# Patient Record
Sex: MALE | Race: ASIAN | Employment: FULL TIME | URBAN - METROPOLITAN AREA
[De-identification: names, ages, dates, MRNs, and addresses within clinical notes are randomized per-mention and may not be internally consistent; named-entity substitution may affect disease eponyms.]

---

## 2017-06-21 ENCOUNTER — OFFICE VISIT (OUTPATIENT)
Dept: FAMILY MEDICINE CLINIC | Facility: CLINIC | Age: 52
End: 2017-06-21

## 2017-06-21 VITALS
BODY MASS INDEX: 22.75 KG/M2 | HEART RATE: 64 BPM | WEIGHT: 168 LBS | SYSTOLIC BLOOD PRESSURE: 130 MMHG | DIASTOLIC BLOOD PRESSURE: 70 MMHG | RESPIRATION RATE: 14 BRPM | HEIGHT: 72 IN

## 2017-06-21 DIAGNOSIS — B07.9 VIRAL WARTS, UNSPECIFIED TYPE: Primary | ICD-10-CM

## 2017-06-21 PROCEDURE — 17110 DESTRUCTION B9 LES UP TO 14: CPT | Performed by: FAMILY MEDICINE

## 2017-06-23 NOTE — PROGRESS NOTES
Dorene Vogel is a 50year old male. HPI:   Patient is here this evening with his daughter and wanted to show me something on his hands. He wanted to know if there is something over-the-counter that he can do for it.   Patient states he has had these lesions yellow-tan   granular material.  Representative sections of the right tonsil are   submitted in cassette A.  B- Labeled with the patient's name, medical record number, left tonsil,   received in formalin:  Specimen consists of one pink to yellow-tan   [place]

## 2017-07-14 ENCOUNTER — OFFICE VISIT (OUTPATIENT)
Dept: FAMILY MEDICINE CLINIC | Facility: CLINIC | Age: 52
End: 2017-07-14

## 2017-07-14 ENCOUNTER — LAB ENCOUNTER (OUTPATIENT)
Dept: LAB | Age: 52
End: 2017-07-14
Attending: FAMILY MEDICINE
Payer: COMMERCIAL

## 2017-07-14 VITALS
WEIGHT: 168 LBS | HEIGHT: 70.25 IN | DIASTOLIC BLOOD PRESSURE: 68 MMHG | RESPIRATION RATE: 16 BRPM | BODY MASS INDEX: 24.05 KG/M2 | TEMPERATURE: 98 F | HEART RATE: 77 BPM | SYSTOLIC BLOOD PRESSURE: 102 MMHG | OXYGEN SATURATION: 97 %

## 2017-07-14 DIAGNOSIS — E83.52 HYPERCALCEMIA: ICD-10-CM

## 2017-07-14 DIAGNOSIS — D64.9 ANEMIA, UNSPECIFIED TYPE: ICD-10-CM

## 2017-07-14 DIAGNOSIS — R82.90 ABNORMAL URINE: ICD-10-CM

## 2017-07-14 DIAGNOSIS — R53.83 OTHER FATIGUE: ICD-10-CM

## 2017-07-14 DIAGNOSIS — Z13.89 SCREENING FOR GENITOURINARY CONDITION: ICD-10-CM

## 2017-07-14 DIAGNOSIS — E78.1 HYPERTRIGLYCERIDEMIA: Primary | ICD-10-CM

## 2017-07-14 DIAGNOSIS — L65.9 BALDING: ICD-10-CM

## 2017-07-14 DIAGNOSIS — Z12.11 SCREENING FOR COLON CANCER: ICD-10-CM

## 2017-07-14 LAB
25-HYDROXYVITAMIN D (TOTAL): 14.3 NG/ML (ref 30–100)
ALBUMIN SERPL-MCNC: 4.1 G/DL (ref 3.5–4.8)
ALP LIVER SERPL-CCNC: 50 U/L (ref 45–117)
ALT SERPL-CCNC: 57 U/L (ref 17–63)
APPEARANCE: CLEAR
AST SERPL-CCNC: 28 U/L (ref 15–41)
BASOPHILS # BLD AUTO: 0.06 X10(3) UL (ref 0–0.1)
BASOPHILS NFR BLD AUTO: 0.9 %
BILIRUB SERPL-MCNC: 0.4 MG/DL (ref 0.1–2)
BILIRUB UR QL STRIP.AUTO: NEGATIVE
BUN BLD-MCNC: 12 MG/DL (ref 8–20)
CALCIUM BLD-MCNC: 9.3 MG/DL (ref 8.3–10.3)
CALCIUM BLD-MCNC: 9.3 MG/DL (ref 8.3–10.3)
CHLORIDE: 105 MMOL/L (ref 101–111)
CLARITY UR REFRACT.AUTO: CLEAR
CO2: 28 MMOL/L (ref 22–32)
COLOR UR AUTO: YELLOW
CREAT BLD-MCNC: 1.07 MG/DL (ref 0.7–1.3)
CREAT BLD-MCNC: 1.1 MG/DL (ref 0.7–1.3)
EOSINOPHIL # BLD AUTO: 0.19 X10(3) UL (ref 0–0.3)
EOSINOPHIL NFR BLD AUTO: 2.9 %
ERYTHROCYTE [DISTWIDTH] IN BLOOD BY AUTOMATED COUNT: 13.8 % (ref 11.5–16)
GLUCOSE BLD-MCNC: 88 MG/DL (ref 70–99)
GLUCOSE UR STRIP.AUTO-MCNC: NEGATIVE MG/DL
HAV AB SERPL IA-ACNC: 375 PG/ML (ref 193–986)
HCT VFR BLD AUTO: 45.8 % (ref 37–53)
HGB BLD-MCNC: 15.1 G/DL (ref 13–17)
IMMATURE GRANULOCYTE COUNT: 0.02 X10(3) UL (ref 0–1)
IMMATURE GRANULOCYTE RATIO %: 0.3 %
KETONES UR STRIP.AUTO-MCNC: NEGATIVE MG/DL
LEUKOCYTE ESTERASE UR QL STRIP.AUTO: NEGATIVE
LYMPHOCYTES # BLD AUTO: 3.18 X10(3) UL (ref 0.9–4)
LYMPHOCYTES NFR BLD AUTO: 48.1 %
M PROTEIN MFR SERPL ELPH: 7.8 G/DL (ref 6.1–8.3)
MCH RBC QN AUTO: 26.3 PG (ref 27–33.2)
MCHC RBC AUTO-ENTMCNC: 33 G/DL (ref 31–37)
MCV RBC AUTO: 79.7 FL (ref 80–99)
MONOCYTES # BLD AUTO: 0.6 X10(3) UL (ref 0.1–0.6)
MONOCYTES NFR BLD AUTO: 9.1 %
MULTISTIX LOT#: NORMAL NUMERIC
NEUTROPHIL ABS PRELIM: 2.56 X10 (3) UL (ref 1.3–6.7)
NEUTROPHILS # BLD AUTO: 2.56 X10(3) UL (ref 1.3–6.7)
NEUTROPHILS NFR BLD AUTO: 38.7 %
NITRITE UR QL STRIP.AUTO: NEGATIVE
PH UR STRIP.AUTO: 7 [PH] (ref 4.5–8)
PH, URINE: 7.5 (ref 4.5–8)
PHOSPHATE SERPL-MCNC: 3.4 MG/DL (ref 2.5–4.9)
PLATELET # BLD AUTO: 350 10(3)UL (ref 150–450)
POTASSIUM SERPL-SCNC: 4 MMOL/L (ref 3.6–5.1)
PROT UR STRIP.AUTO-MCNC: NEGATIVE MG/DL
PTH-INTACT SERPL-MCNC: 32.4 PG/ML (ref 11.1–79.5)
RBC # BLD AUTO: 5.75 X10(6)UL (ref 4.3–5.7)
RBC UR QL AUTO: NEGATIVE
RED CELL DISTRIBUTION WIDTH-SD: 39.5 FL (ref 35.1–46.3)
SODIUM SERPL-SCNC: 140 MMOL/L (ref 136–144)
SP GR UR STRIP.AUTO: 1.02 (ref 1–1.03)
SPECIFIC GRAVITY: 1.01 (ref 1–1.03)
TSI SER-ACNC: 1.53 MIU/ML (ref 0.35–5.5)
URINE-COLOR: YELLOW
UROBILINOGEN UR STRIP.AUTO-MCNC: <2 MG/DL
UROBILINOGEN,SEMI-QN: 0.2 MG/DL (ref 0–1.9)
WBC # BLD AUTO: 6.6 X10(3) UL (ref 4–13)

## 2017-07-14 PROCEDURE — 80050 GENERAL HEALTH PANEL: CPT | Performed by: FAMILY MEDICINE

## 2017-07-14 PROCEDURE — 84402 ASSAY OF FREE TESTOSTERONE: CPT | Performed by: FAMILY MEDICINE

## 2017-07-14 PROCEDURE — 84403 ASSAY OF TOTAL TESTOSTERONE: CPT | Performed by: FAMILY MEDICINE

## 2017-07-14 PROCEDURE — 83970 ASSAY OF PARATHORMONE: CPT | Performed by: FAMILY MEDICINE

## 2017-07-14 PROCEDURE — 83550 IRON BINDING TEST: CPT | Performed by: FAMILY MEDICINE

## 2017-07-14 PROCEDURE — 83540 ASSAY OF IRON: CPT | Performed by: FAMILY MEDICINE

## 2017-07-14 PROCEDURE — 82306 VITAMIN D 25 HYDROXY: CPT | Performed by: FAMILY MEDICINE

## 2017-07-14 PROCEDURE — 99396 PREV VISIT EST AGE 40-64: CPT | Performed by: FAMILY MEDICINE

## 2017-07-14 PROCEDURE — 36415 COLL VENOUS BLD VENIPUNCTURE: CPT | Performed by: FAMILY MEDICINE

## 2017-07-14 PROCEDURE — 84100 ASSAY OF PHOSPHORUS: CPT | Performed by: FAMILY MEDICINE

## 2017-07-14 PROCEDURE — 82607 VITAMIN B-12: CPT | Performed by: FAMILY MEDICINE

## 2017-07-14 PROCEDURE — 81003 URINALYSIS AUTO W/O SCOPE: CPT | Performed by: FAMILY MEDICINE

## 2017-07-14 RX ORDER — DOXEPIN HYDROCHLORIDE 50 MG/1
1 CAPSULE ORAL DAILY
COMMUNITY
End: 2020-04-21 | Stop reason: ALTCHOICE

## 2017-07-14 NOTE — H&P
Eva Sosa is a 50year old male who presents for a complete physical exam.   HPI:   Pt complains of being more tired and a lesion on his heel.     Wt Readings from Last 6 Encounters:  07/14/17 : 168 lb  06/21/17 : 168 lb  06/29/16 : 163 lb  05/27/16 : 160 l yellow-tan   palatine tonsil measuring 3.5 x 3.0 x 1.5 cm. It is serially sectioned   and shows its normal pink to yellow-tan corrugated architecture. The   tonsil weighs 7.3 grams.   The tonsillar crypts show firm yellow-tan   granular material.  Represe anxiety  HEMATOLOGIC: denies hx of anemia  ENDOCRINE: denies thyroid history  ALL/ASTHMA: denies hx of allergy or asthma    EXAM:   /68 (BP Location: Left arm, Patient Position: Sitting, Cuff Size: adult)   Pulse 77   Temp 98.2 °F (36.8 °C) (Oral) these issues and agrees to the plan. The patient is asked to return in 6 months.

## 2017-07-17 DIAGNOSIS — D64.9 ANEMIA, UNSPECIFIED TYPE: Primary | ICD-10-CM

## 2017-07-17 LAB
IRON SATURATION: 29 % (ref 13–45)
IRON: 127 UG/DL (ref 45–182)
TESTOSTERONE TOTAL: 396 NG/DL
TESTOSTERONE, FREE -MS/MS: 83.6 PG/ML
TOTAL IRON BINDING CAPACITY: 441 UG/DL (ref 298–536)
TRANSFERRIN: 296 MG/DL (ref 200–360)

## 2017-07-18 DIAGNOSIS — R79.89 LOW VITAMIN D LEVEL: Primary | ICD-10-CM

## 2017-07-18 RX ORDER — ERGOCALCIFEROL 1.25 MG/1
50000 CAPSULE ORAL WEEKLY
Qty: 12 CAPSULE | Refills: 0 | Status: SHIPPED | OUTPATIENT
Start: 2017-07-18 | End: 2020-04-21 | Stop reason: ALTCHOICE

## 2017-07-19 DIAGNOSIS — R71.8 MICROCYTOSIS: Primary | ICD-10-CM

## 2017-08-23 ENCOUNTER — OFFICE VISIT (OUTPATIENT)
Dept: FAMILY MEDICINE CLINIC | Facility: CLINIC | Age: 52
End: 2017-08-23

## 2017-08-23 VITALS
OXYGEN SATURATION: 100 % | DIASTOLIC BLOOD PRESSURE: 80 MMHG | RESPIRATION RATE: 16 BRPM | TEMPERATURE: 98 F | SYSTOLIC BLOOD PRESSURE: 130 MMHG | WEIGHT: 166 LBS | HEIGHT: 70.25 IN | HEART RATE: 76 BPM | BODY MASS INDEX: 23.77 KG/M2

## 2017-08-23 DIAGNOSIS — J01.00 ACUTE NON-RECURRENT MAXILLARY SINUSITIS: Primary | ICD-10-CM

## 2017-08-23 DIAGNOSIS — B07.9 VIRAL WARTS, UNSPECIFIED TYPE: ICD-10-CM

## 2017-08-23 DIAGNOSIS — Z86.39 HISTORY OF IRON DEFICIENCY: ICD-10-CM

## 2017-08-23 DIAGNOSIS — R79.89 ABNORMAL CBC: ICD-10-CM

## 2017-08-23 PROCEDURE — 99213 OFFICE O/P EST LOW 20 MIN: CPT | Performed by: FAMILY MEDICINE

## 2017-08-23 PROCEDURE — 17110 DESTRUCTION B9 LES UP TO 14: CPT | Performed by: FAMILY MEDICINE

## 2017-08-23 RX ORDER — AMOXICILLIN AND CLAVULANATE POTASSIUM 875; 125 MG/1; MG/1
1 TABLET, FILM COATED ORAL 2 TIMES DAILY
Qty: 20 TABLET | Refills: 0 | Status: SHIPPED | OUTPATIENT
Start: 2017-08-23 | End: 2017-09-02

## 2017-08-23 NOTE — PROGRESS NOTES
HPI:   Long De Oliveira is a 50year old male who presents for upper respiratory symptoms for  6  days. Patient reports sore throat, congestion, sinus pain.       Current Outpatient Prescriptions:  Amoxicillin-Pot Clavulanate 875-125 MG Oral Tab Take 1 tablet by m lesions; wart frozen on left lateral heel and tip of second digit on left hand  EYES:PERRL, EOMI,conjunctiva are clear; left conjunctiva is red  HEENT: atraumatic, normocephalic,ears and throat are clear; maxillary and frontal sinuses transilluminated well

## 2017-10-09 RX ORDER — ERGOCALCIFEROL 1.25 MG/1
CAPSULE ORAL
Qty: 4 CAPSULE | Refills: 0 | OUTPATIENT
Start: 2017-10-09

## 2017-10-09 NOTE — TELEPHONE ENCOUNTER
Notes Recorded by Demetri Parsons DO on 7/17/2017 at 5:53 PM CDT  Testosterone, cmp, tsh, pth wnl  Vitamin d is low -- advise 87278go/wk for 12 weeks then 2000iu/d and recheck vitamin d in 4 months  Anemia  Will check for iron def.    Vitamin b12 on low end

## 2017-11-22 ENCOUNTER — LAB ENCOUNTER (OUTPATIENT)
Dept: LAB | Age: 52
End: 2017-11-22
Attending: FAMILY MEDICINE
Payer: COMMERCIAL

## 2017-11-22 DIAGNOSIS — R79.89 ABNORMAL CBC: ICD-10-CM

## 2017-11-22 DIAGNOSIS — R79.89 LOW VITAMIN D LEVEL: ICD-10-CM

## 2017-11-22 DIAGNOSIS — R71.8 MICROCYTOSIS: ICD-10-CM

## 2017-11-22 DIAGNOSIS — E83.52 HYPERCALCEMIA: ICD-10-CM

## 2017-11-22 DIAGNOSIS — E78.1 HYPERTRIGLYCERIDEMIA: ICD-10-CM

## 2017-11-22 DIAGNOSIS — R73.9 HYPERGLYCEMIA: ICD-10-CM

## 2017-11-22 DIAGNOSIS — D64.9 ANEMIA, UNSPECIFIED TYPE: ICD-10-CM

## 2017-11-22 DIAGNOSIS — Z86.39 HISTORY OF IRON DEFICIENCY: ICD-10-CM

## 2017-11-22 PROCEDURE — 83036 HEMOGLOBIN GLYCOSYLATED A1C: CPT | Performed by: FAMILY MEDICINE

## 2017-11-22 PROCEDURE — 36415 COLL VENOUS BLD VENIPUNCTURE: CPT | Performed by: FAMILY MEDICINE

## 2017-11-22 PROCEDURE — 82306 VITAMIN D 25 HYDROXY: CPT | Performed by: FAMILY MEDICINE

## 2017-11-22 PROCEDURE — 82728 ASSAY OF FERRITIN: CPT | Performed by: FAMILY MEDICINE

## 2017-11-22 PROCEDURE — 85025 COMPLETE CBC W/AUTO DIFF WBC: CPT | Performed by: FAMILY MEDICINE

## 2017-11-22 PROCEDURE — 80061 LIPID PANEL: CPT | Performed by: FAMILY MEDICINE

## 2017-11-22 PROCEDURE — 81405 MOPATH PROCEDURE LEVEL 6: CPT | Performed by: FAMILY MEDICINE

## 2017-11-22 PROCEDURE — 80053 COMPREHEN METABOLIC PANEL: CPT | Performed by: FAMILY MEDICINE

## 2017-11-25 DIAGNOSIS — R73.9 HYPERGLYCEMIA: Primary | ICD-10-CM

## 2017-12-01 PROBLEM — D56.3 ALPHA THALASSEMIA SILENT CARRIER: Status: ACTIVE | Noted: 2017-12-01

## 2017-12-07 DIAGNOSIS — R73.03 PREDIABETES: Primary | ICD-10-CM

## 2017-12-07 DIAGNOSIS — E55.9 VITAMIN D DEFICIENCY: ICD-10-CM

## 2017-12-07 DIAGNOSIS — E78.1 HIGH BLOOD TRIGLYCERIDES: ICD-10-CM

## 2018-02-28 ENCOUNTER — OFFICE VISIT (OUTPATIENT)
Dept: FAMILY MEDICINE CLINIC | Facility: CLINIC | Age: 53
End: 2018-02-28

## 2018-02-28 VITALS
WEIGHT: 170 LBS | RESPIRATION RATE: 12 BRPM | HEART RATE: 68 BPM | SYSTOLIC BLOOD PRESSURE: 120 MMHG | DIASTOLIC BLOOD PRESSURE: 70 MMHG | TEMPERATURE: 98 F | BODY MASS INDEX: 24.34 KG/M2 | HEIGHT: 70.25 IN

## 2018-02-28 DIAGNOSIS — E78.1 HYPERTRIGLYCERIDEMIA: ICD-10-CM

## 2018-02-28 DIAGNOSIS — E55.9 VITAMIN D DEFICIENCY: ICD-10-CM

## 2018-02-28 DIAGNOSIS — D23.9 FIBROMA OF SKIN: ICD-10-CM

## 2018-02-28 DIAGNOSIS — Z86.39 HISTORY OF IRON DEFICIENCY: ICD-10-CM

## 2018-02-28 DIAGNOSIS — D56.3 ALPHA THALASSEMIA SILENT CARRIER: ICD-10-CM

## 2018-02-28 DIAGNOSIS — R73.03 PREDIABETES: Primary | ICD-10-CM

## 2018-02-28 DIAGNOSIS — E78.6 LOW HDL (UNDER 40): ICD-10-CM

## 2018-02-28 PROCEDURE — 99214 OFFICE O/P EST MOD 30 MIN: CPT | Performed by: FAMILY MEDICINE

## 2018-02-28 NOTE — PROGRESS NOTES
Robert Giraldo is a 50year old male. HPI:   Pt. States he is here to discuss his blood work from 11/17. He is not exercising. He is taking 5000iu/d for the past year. He states that his TG are always elevated.   He stopped niacin in 7/17 and his TG increase 85.3 22.0 - 322.0 ng/mL   -VITAMIN D, 25-HYDROXY   Result Value Ref Range   25-Hydroxyvitamin D (Total) 29.4 (L) 30.0 - 100.0 ng/mL   -THALASSEMIA ALPHA GLOBIN HBA1/2 SEQUENCING   Result Value Ref Range   Alpha Globin (HBA1/2) [place] Specimen Whole Blood Ht 70.25\"   Wt 170 lb   BMI 24.22 kg/m²   GENERAL: well developed, well nourished,in no apparent distress  PSYCHE: normal mood and affect  SKIN: fibroma on left lateral foot -- 5 mm in diameter; he feels it is grown and not a wart  NECK: supple,no adenopa

## 2018-06-13 ENCOUNTER — APPOINTMENT (OUTPATIENT)
Dept: LAB | Age: 53
End: 2018-06-13
Attending: FAMILY MEDICINE
Payer: COMMERCIAL

## 2018-06-13 DIAGNOSIS — E78.1 HYPERTRIGLYCERIDEMIA: ICD-10-CM

## 2018-06-13 DIAGNOSIS — E78.6 LOW HDL (UNDER 40): ICD-10-CM

## 2018-06-13 DIAGNOSIS — E55.9 VITAMIN D DEFICIENCY: ICD-10-CM

## 2018-06-13 DIAGNOSIS — R73.03 PREDIABETES: ICD-10-CM

## 2018-06-13 PROCEDURE — 80061 LIPID PANEL: CPT | Performed by: FAMILY MEDICINE

## 2018-06-13 PROCEDURE — 36415 COLL VENOUS BLD VENIPUNCTURE: CPT | Performed by: FAMILY MEDICINE

## 2018-06-13 PROCEDURE — 80053 COMPREHEN METABOLIC PANEL: CPT | Performed by: FAMILY MEDICINE

## 2018-06-13 PROCEDURE — 83036 HEMOGLOBIN GLYCOSYLATED A1C: CPT | Performed by: FAMILY MEDICINE

## 2018-06-13 PROCEDURE — 82306 VITAMIN D 25 HYDROXY: CPT | Performed by: FAMILY MEDICINE

## 2018-06-18 ENCOUNTER — OFFICE VISIT (OUTPATIENT)
Dept: FAMILY MEDICINE CLINIC | Facility: CLINIC | Age: 53
End: 2018-06-18

## 2018-06-18 VITALS
WEIGHT: 168 LBS | RESPIRATION RATE: 12 BRPM | HEIGHT: 70.5 IN | DIASTOLIC BLOOD PRESSURE: 70 MMHG | BODY MASS INDEX: 23.78 KG/M2 | HEART RATE: 78 BPM | SYSTOLIC BLOOD PRESSURE: 110 MMHG

## 2018-06-18 DIAGNOSIS — E78.6 LOW HDL (UNDER 40): ICD-10-CM

## 2018-06-18 DIAGNOSIS — Z86.39 HISTORY OF IRON DEFICIENCY: ICD-10-CM

## 2018-06-18 DIAGNOSIS — R73.03 PREDIABETES: Primary | ICD-10-CM

## 2018-06-18 DIAGNOSIS — Z12.11 SCREENING FOR MALIGNANT NEOPLASM OF COLON: ICD-10-CM

## 2018-06-18 DIAGNOSIS — E55.9 VITAMIN D DEFICIENCY: ICD-10-CM

## 2018-06-18 DIAGNOSIS — E78.1 HYPERTRIGLYCERIDEMIA: ICD-10-CM

## 2018-06-18 DIAGNOSIS — D56.3 ALPHA THALASSEMIA SILENT CARRIER: ICD-10-CM

## 2018-06-18 PROCEDURE — 99214 OFFICE O/P EST MOD 30 MIN: CPT | Performed by: FAMILY MEDICINE

## 2018-06-18 NOTE — PROGRESS NOTES
Treasure Goltz is a 48year old male. HPI:   Pt. States he is here to discuss his blood work from 6/18. He is not exercising. He is aware he has to focus on diet and exercise. He is taking 5000iu/d for the past year.   Hypertriglyceridemia -- focusing on die 117 68 - 126 mg/dL   -VITAMIN D, 25-HYDROXY   Result Value Ref Range   25-Hydroxyvitamin D (Total) 37.8 30.0 - 100.0 ng/mL       REVIEW OF SYSTEMS:   GENERAL: feels well otherwise  SKIN: skin lesions  EYES:denies blurred vision or double vision  HEENT: den exercise  Dyslipidemia/hypertriglyceridemia–patient will continue to focus on diet exercise and cut down on the starches in his diet  Vitamin D deficiency–advised to cont.  5000iu/d  Thalassemia–currently stable  The patient indicates understanding of these

## 2018-12-14 ENCOUNTER — TELEPHONE (OUTPATIENT)
Dept: FAMILY MEDICINE CLINIC | Facility: CLINIC | Age: 53
End: 2018-12-14

## 2018-12-14 NOTE — TELEPHONE ENCOUNTER
Dr. Dominguez Mail,    A claim was denied by [facility] for this patient for a Lab for Alpha Thalassemia done on 11/22/17. [employer] would like to set up a Peer to Peer. Please call the number below to set that up.     [employer]  [phone]

## 2018-12-17 NOTE — TELEPHONE ENCOUNTER
Spoke with Raji from [facility]. She stated for this case a peer to peer is not an option.  can only send in a letter of appeal with addition information.

## 2019-02-04 ENCOUNTER — PATIENT OUTREACH (OUTPATIENT)
Dept: FAMILY MEDICINE CLINIC | Facility: CLINIC | Age: 54
End: 2019-02-04

## 2019-02-04 NOTE — PROGRESS NOTES
Per notes patient is due for colonoscopy. Please remind patient to follow up with Dr. Vickie Jacobson re: colonoscopy/screening colon cancer.  Thank you

## 2020-04-20 ENCOUNTER — TELEPHONE (OUTPATIENT)
Dept: FAMILY MEDICINE CLINIC | Facility: CLINIC | Age: 55
End: 2020-04-20

## 2020-04-20 NOTE — TELEPHONE ENCOUNTER
My chart msg received. Pt called this morning also. Bring in for office visit?     Appointment For: Olga Carlos ([id number])   Visit Type: MYCHART EXAM (2964)      5/2/2020    11:45 AM  15 mins.  Haylee Núñez DO         EM[facility] [address]      Patient Comments

## 2020-04-21 ENCOUNTER — OFFICE VISIT (OUTPATIENT)
Dept: FAMILY MEDICINE CLINIC | Facility: CLINIC | Age: 55
End: 2020-04-21
Payer: COMMERCIAL

## 2020-04-21 ENCOUNTER — LAB ENCOUNTER (OUTPATIENT)
Dept: LAB | Age: 55
End: 2020-04-21
Attending: FAMILY MEDICINE
Payer: COMMERCIAL

## 2020-04-21 ENCOUNTER — HOSPITAL ENCOUNTER (OUTPATIENT)
Dept: ULTRASOUND IMAGING | Age: 55
Discharge: HOME | End: 2020-04-21
Attending: FAMILY MEDICINE
Payer: COMMERCIAL

## 2020-04-21 VITALS
SYSTOLIC BLOOD PRESSURE: 130 MMHG | DIASTOLIC BLOOD PRESSURE: 80 MMHG | TEMPERATURE: 98 F | BODY MASS INDEX: 23.78 KG/M2 | RESPIRATION RATE: 18 BRPM | WEIGHT: 168 LBS | HEART RATE: 76 BPM | HEIGHT: 70.5 IN

## 2020-04-21 DIAGNOSIS — E78.5 DYSLIPIDEMIA: ICD-10-CM

## 2020-04-21 DIAGNOSIS — R59.1 LAD (LYMPHADENOPATHY): Primary | ICD-10-CM

## 2020-04-21 DIAGNOSIS — Z00.00 LABORATORY EXAMINATION ORDERED AS PART OF A ROUTINE GENERAL MEDICAL EXAMINATION: ICD-10-CM

## 2020-04-21 DIAGNOSIS — E55.9 VITAMIN D DEFICIENCY: ICD-10-CM

## 2020-04-21 DIAGNOSIS — R59.1 LAD (LYMPHADENOPATHY): ICD-10-CM

## 2020-04-21 DIAGNOSIS — Z13.89 SCREENING FOR GENITOURINARY CONDITION: ICD-10-CM

## 2020-04-21 DIAGNOSIS — R73.03 PREDIABETES: ICD-10-CM

## 2020-04-21 DIAGNOSIS — Z80.7: ICD-10-CM

## 2020-04-21 PROCEDURE — 80061 LIPID PANEL: CPT | Performed by: FAMILY MEDICINE

## 2020-04-21 PROCEDURE — 81001 URINALYSIS AUTO W/SCOPE: CPT | Performed by: FAMILY MEDICINE

## 2020-04-21 PROCEDURE — 82306 VITAMIN D 25 HYDROXY: CPT | Performed by: FAMILY MEDICINE

## 2020-04-21 PROCEDURE — 36415 COLL VENOUS BLD VENIPUNCTURE: CPT | Performed by: FAMILY MEDICINE

## 2020-04-21 PROCEDURE — 83036 HEMOGLOBIN GLYCOSYLATED A1C: CPT | Performed by: FAMILY MEDICINE

## 2020-04-21 PROCEDURE — 80050 GENERAL HEALTH PANEL: CPT | Performed by: FAMILY MEDICINE

## 2020-04-21 PROCEDURE — 99214 OFFICE O/P EST MOD 30 MIN: CPT | Performed by: FAMILY MEDICINE

## 2020-04-21 PROCEDURE — 76536 US EXAM OF HEAD AND NECK: CPT | Performed by: FAMILY MEDICINE

## 2020-04-21 RX ORDER — ERGOCALCIFEROL 1.25 MG/1
50000 CAPSULE ORAL WEEKLY
Qty: 12 CAPSULE | Refills: 0 | Status: SHIPPED | OUTPATIENT
Start: 2020-04-21 | End: 2020-05-21

## 2020-04-21 NOTE — PROGRESS NOTES
Marilu Liz is a 48year old male. HPI:   Patient complains of a large lump in the base of the left neck for the past 2 weeks. He states he was feeling around on his neck and noticed the lump.   Patient denies any fevers, chills, weight loss, aches or pains Vitamin D, 25OH, Total 37.8 30.0 - 100.0 ng/mL       REVIEW OF SYSTEMS:   GENERAL: feels well otherwise  SKIN: denies any unusual skin lesions  LUNGS: denies shortness of breath with exertion  CARDIOVASCULAR: denies chest pain on exertion  GI: denies abdom indicates understanding of these issues and agrees to the plan.   Return in about 3 months (around 7/21/2020) for physical.

## 2020-04-22 ENCOUNTER — TELEPHONE (OUTPATIENT)
Dept: FAMILY MEDICINE CLINIC | Facility: CLINIC | Age: 55
End: 2020-04-22

## 2020-04-22 DIAGNOSIS — R31.29 MICROSCOPIC HEMATURIA: Primary | ICD-10-CM

## 2020-04-22 DIAGNOSIS — E55.9 VITAMIN D DEFICIENCY: Primary | ICD-10-CM

## 2020-05-28 ENCOUNTER — HOSPITAL ENCOUNTER (OUTPATIENT)
Dept: ULTRASOUND IMAGING | Age: 55
Discharge: HOME | End: 2020-05-28
Attending: FAMILY MEDICINE
Payer: COMMERCIAL

## 2020-05-28 DIAGNOSIS — R59.1 LAD (LYMPHADENOPATHY): ICD-10-CM

## 2020-05-28 PROCEDURE — 76536 US EXAM OF HEAD AND NECK: CPT | Performed by: FAMILY MEDICINE

## 2020-06-23 ENCOUNTER — OFFICE VISIT (OUTPATIENT)
Dept: FAMILY MEDICINE CLINIC | Facility: CLINIC | Age: 55
End: 2020-06-23
Payer: COMMERCIAL

## 2020-06-23 VITALS
DIASTOLIC BLOOD PRESSURE: 70 MMHG | HEIGHT: 71 IN | WEIGHT: 171 LBS | HEART RATE: 80 BPM | SYSTOLIC BLOOD PRESSURE: 112 MMHG | BODY MASS INDEX: 23.94 KG/M2 | TEMPERATURE: 99 F | RESPIRATION RATE: 16 BRPM

## 2020-06-23 DIAGNOSIS — R31.29 MICROSCOPIC HEMATURIA: ICD-10-CM

## 2020-06-23 DIAGNOSIS — E55.9 VITAMIN D DEFICIENCY: ICD-10-CM

## 2020-06-23 DIAGNOSIS — R73.03 PREDIABETES: ICD-10-CM

## 2020-06-23 DIAGNOSIS — R59.1 LAD (LYMPHADENOPATHY): ICD-10-CM

## 2020-06-23 DIAGNOSIS — E78.1 HYPERTRIGLYCERIDEMIA: ICD-10-CM

## 2020-06-23 DIAGNOSIS — Z86.39 HISTORY OF IRON DEFICIENCY: ICD-10-CM

## 2020-06-23 DIAGNOSIS — Z80.7: ICD-10-CM

## 2020-06-23 DIAGNOSIS — D56.3 ALPHA THALASSEMIA SILENT CARRIER: ICD-10-CM

## 2020-06-23 DIAGNOSIS — E78.6 LOW HDL (UNDER 40): ICD-10-CM

## 2020-06-23 DIAGNOSIS — Z01.818 PREOP EXAMINATION: Primary | ICD-10-CM

## 2020-06-23 PROCEDURE — 93000 ELECTROCARDIOGRAM COMPLETE: CPT | Performed by: FAMILY MEDICINE

## 2020-06-23 PROCEDURE — 99244 OFF/OP CNSLTJ NEW/EST MOD 40: CPT | Performed by: FAMILY MEDICINE

## 2020-06-23 RX ORDER — CHOLECALCIFEROL (VITAMIN D3) 1250 MCG
CAPSULE ORAL WEEKLY
COMMUNITY
End: 2021-04-01 | Stop reason: ALTCHOICE

## 2020-06-23 NOTE — H&P
Sharonda Aldana is a 50year old male who presents for a pre-operative physical exam. Patient is to have left deep neck mass excision with assistant, to be done by Dr. Mehdi Goodwin at [address] on 6/25/2020. HPI:   Pt complains of LAD.     Current Outpatient [facility] itching,denies nocturia or changes in stream  MUSCULOSKELETAL: denies back pain  NEURO: denies headaches  PSYCHE: denies depression or anxiety  HEMATOLOGIC: denies hx of anemia  ENDOCRINE: denies thyroid history  ALL/ASTHMA: denies hx of allergy or asthma or pulmonary conditions. Pt is a good surgical candidate. This consult was sent back the referring physician, Dr. Mehdi Goodwin.

## 2020-06-24 ENCOUNTER — LAB ENCOUNTER (OUTPATIENT)
Dept: LAB | Facility: HOSPITAL | Age: 55
End: 2020-06-24
Attending: OTOLARYNGOLOGY
Payer: COMMERCIAL

## 2020-06-24 DIAGNOSIS — I88.9 CERVICAL LYMPHADENITIS: ICD-10-CM

## 2020-06-25 ENCOUNTER — ANESTHESIA EVENT (OUTPATIENT)
Dept: SURGERY | Facility: HOSPITAL | Age: 55
End: 2020-06-25
Payer: COMMERCIAL

## 2020-06-25 ENCOUNTER — HOSPITAL ENCOUNTER (OUTPATIENT)
Facility: HOSPITAL | Age: 55
Setting detail: HOSPITAL OUTPATIENT SURGERY
Discharge: HOME | End: 2020-06-25
Attending: OTOLARYNGOLOGY | Admitting: OTOLARYNGOLOGY
Payer: COMMERCIAL

## 2020-06-25 ENCOUNTER — ANESTHESIA (OUTPATIENT)
Dept: SURGERY | Facility: HOSPITAL | Age: 55
End: 2020-06-25
Payer: COMMERCIAL

## 2020-06-25 VITALS
HEIGHT: 71 IN | TEMPERATURE: 97 F | RESPIRATION RATE: 12 BRPM | OXYGEN SATURATION: 100 % | DIASTOLIC BLOOD PRESSURE: 77 MMHG | BODY MASS INDEX: 23.15 KG/M2 | WEIGHT: 165.38 LBS | SYSTOLIC BLOOD PRESSURE: 135 MMHG | HEART RATE: 55 BPM

## 2020-06-25 DIAGNOSIS — I88.9 CERVICAL LYMPHADENITIS: Primary | ICD-10-CM

## 2020-06-25 DIAGNOSIS — J34.2 DEVIATED NASAL SEPTUM: ICD-10-CM

## 2020-06-25 PROCEDURE — 88184 FLOWCYTOMETRY/ TC 1 MARKER: CPT | Performed by: OTOLARYNGOLOGY

## 2020-06-25 PROCEDURE — 88341 IMHCHEM/IMCYTCHM EA ADD ANTB: CPT | Performed by: OTOLARYNGOLOGY

## 2020-06-25 PROCEDURE — 88185 FLOWCYTOMETRY/TC ADD-ON: CPT | Performed by: OTOLARYNGOLOGY

## 2020-06-25 PROCEDURE — 87206 SMEAR FLUORESCENT/ACID STAI: CPT | Performed by: ANESTHESIOLOGY

## 2020-06-25 PROCEDURE — 88342 IMHCHEM/IMCYTCHM 1ST ANTB: CPT | Performed by: OTOLARYNGOLOGY

## 2020-06-25 PROCEDURE — 88307 TISSUE EXAM BY PATHOLOGIST: CPT | Performed by: OTOLARYNGOLOGY

## 2020-06-25 PROCEDURE — 87116 MYCOBACTERIA CULTURE: CPT | Performed by: ANESTHESIOLOGY

## 2020-06-25 PROCEDURE — 87102 FUNGUS ISOLATION CULTURE: CPT | Performed by: ANESTHESIOLOGY

## 2020-06-25 PROCEDURE — 88321 CONSLTJ&REPRT SLD PREP ELSWR: CPT | Performed by: OTOLARYNGOLOGY

## 2020-06-25 PROCEDURE — 07B20ZX EXCISION OF LEFT NECK LYMPHATIC, OPEN APPROACH, DIAGNOSTIC: ICD-10-PCS | Performed by: OTOLARYNGOLOGY

## 2020-06-25 RX ORDER — SODIUM CHLORIDE, SODIUM LACTATE, POTASSIUM CHLORIDE, CALCIUM CHLORIDE 600; 310; 30; 20 MG/100ML; MG/100ML; MG/100ML; MG/100ML
INJECTION, SOLUTION INTRAVENOUS CONTINUOUS
Status: DISCONTINUED | OUTPATIENT
Start: 2020-06-25 | End: 2020-06-25

## 2020-06-25 RX ORDER — NALOXONE HYDROCHLORIDE 0.4 MG/ML
80 INJECTION, SOLUTION INTRAMUSCULAR; INTRAVENOUS; SUBCUTANEOUS AS NEEDED
Status: DISCONTINUED | OUTPATIENT
Start: 2020-06-25 | End: 2020-06-25

## 2020-06-25 RX ORDER — DEXAMETHASONE SODIUM PHOSPHATE 4 MG/ML
4 VIAL (ML) INJECTION AS NEEDED
Status: DISCONTINUED | OUTPATIENT
Start: 2020-06-25 | End: 2020-06-25

## 2020-06-25 RX ORDER — ROCURONIUM BROMIDE 10 MG/ML
INJECTION, SOLUTION INTRAVENOUS AS NEEDED
Status: DISCONTINUED | OUTPATIENT
Start: 2020-06-25 | End: 2020-06-25 | Stop reason: SURG

## 2020-06-25 RX ORDER — NEOSTIGMINE METHYLSULFATE 1 MG/ML
INJECTION INTRAVENOUS AS NEEDED
Status: DISCONTINUED | OUTPATIENT
Start: 2020-06-25 | End: 2020-06-25 | Stop reason: SURG

## 2020-06-25 RX ORDER — HYDROCODONE BITARTRATE AND ACETAMINOPHEN 5; 325 MG/1; MG/1
1 TABLET ORAL AS NEEDED
Status: COMPLETED | OUTPATIENT
Start: 2020-06-25 | End: 2020-06-25

## 2020-06-25 RX ORDER — HYDROCODONE BITARTRATE AND ACETAMINOPHEN 5; 325 MG/1; MG/1
2 TABLET ORAL AS NEEDED
Status: COMPLETED | OUTPATIENT
Start: 2020-06-25 | End: 2020-06-25

## 2020-06-25 RX ORDER — CEFAZOLIN SODIUM/WATER 2 G/20 ML
2 SYRINGE (ML) INTRAVENOUS ONCE
Status: DISCONTINUED | OUTPATIENT
Start: 2020-06-25 | End: 2020-06-25 | Stop reason: HOSPADM

## 2020-06-25 RX ORDER — ONDANSETRON 2 MG/ML
4 INJECTION INTRAMUSCULAR; INTRAVENOUS AS NEEDED
Status: DISCONTINUED | OUTPATIENT
Start: 2020-06-25 | End: 2020-06-25

## 2020-06-25 RX ORDER — ONDANSETRON 2 MG/ML
INJECTION INTRAMUSCULAR; INTRAVENOUS AS NEEDED
Status: DISCONTINUED | OUTPATIENT
Start: 2020-06-25 | End: 2020-06-25 | Stop reason: SURG

## 2020-06-25 RX ORDER — HYDROMORPHONE HYDROCHLORIDE 1 MG/ML
INJECTION, SOLUTION INTRAMUSCULAR; INTRAVENOUS; SUBCUTANEOUS
Status: COMPLETED
Start: 2020-06-25 | End: 2020-06-25

## 2020-06-25 RX ORDER — CEFAZOLIN SODIUM/WATER 2 G/20 ML
SYRINGE (ML) INTRAVENOUS
Status: DISCONTINUED
Start: 2020-06-25 | End: 2020-06-25

## 2020-06-25 RX ORDER — MIDAZOLAM HYDROCHLORIDE 1 MG/ML
1 INJECTION INTRAMUSCULAR; INTRAVENOUS EVERY 5 MIN PRN
Status: DISCONTINUED | OUTPATIENT
Start: 2020-06-25 | End: 2020-06-25

## 2020-06-25 RX ORDER — ACETAMINOPHEN 500 MG
1000 TABLET ORAL ONCE
Status: DISCONTINUED | OUTPATIENT
Start: 2020-06-25 | End: 2020-06-25 | Stop reason: HOSPADM

## 2020-06-25 RX ORDER — MEPERIDINE HYDROCHLORIDE 25 MG/ML
12.5 INJECTION INTRAMUSCULAR; INTRAVENOUS; SUBCUTANEOUS AS NEEDED
Status: DISCONTINUED | OUTPATIENT
Start: 2020-06-25 | End: 2020-06-25

## 2020-06-25 RX ORDER — ACETAMINOPHEN 500 MG
1000 TABLET ORAL EVERY 6 HOURS PRN
COMMUNITY
End: 2021-06-29 | Stop reason: ALTCHOICE

## 2020-06-25 RX ORDER — METOCLOPRAMIDE HYDROCHLORIDE 5 MG/ML
10 INJECTION INTRAMUSCULAR; INTRAVENOUS AS NEEDED
Status: DISCONTINUED | OUTPATIENT
Start: 2020-06-25 | End: 2020-06-25

## 2020-06-25 RX ORDER — KETOROLAC TROMETHAMINE 30 MG/ML
INJECTION, SOLUTION INTRAMUSCULAR; INTRAVENOUS AS NEEDED
Status: DISCONTINUED | OUTPATIENT
Start: 2020-06-25 | End: 2020-06-25 | Stop reason: SURG

## 2020-06-25 RX ORDER — GLYCOPYRROLATE 0.2 MG/ML
INJECTION, SOLUTION INTRAMUSCULAR; INTRAVENOUS AS NEEDED
Status: DISCONTINUED | OUTPATIENT
Start: 2020-06-25 | End: 2020-06-25 | Stop reason: SURG

## 2020-06-25 RX ORDER — LIDOCAINE HYDROCHLORIDE 10 MG/ML
INJECTION, SOLUTION EPIDURAL; INFILTRATION; INTRACAUDAL; PERINEURAL AS NEEDED
Status: DISCONTINUED | OUTPATIENT
Start: 2020-06-25 | End: 2020-06-25 | Stop reason: SURG

## 2020-06-25 RX ORDER — HYDROMORPHONE HYDROCHLORIDE 1 MG/ML
0.4 INJECTION, SOLUTION INTRAMUSCULAR; INTRAVENOUS; SUBCUTANEOUS EVERY 5 MIN PRN
Status: DISCONTINUED | OUTPATIENT
Start: 2020-06-25 | End: 2020-06-25

## 2020-06-25 RX ORDER — EPHEDRINE SULFATE 50 MG/ML
INJECTION, SOLUTION INTRAVENOUS AS NEEDED
Status: DISCONTINUED | OUTPATIENT
Start: 2020-06-25 | End: 2020-06-25 | Stop reason: SURG

## 2020-06-25 RX ORDER — DEXAMETHASONE SODIUM PHOSPHATE 4 MG/ML
VIAL (ML) INJECTION AS NEEDED
Status: DISCONTINUED | OUTPATIENT
Start: 2020-06-25 | End: 2020-06-25 | Stop reason: SURG

## 2020-06-25 RX ADMIN — GLYCOPYRROLATE 0.2 MG: 0.2 INJECTION, SOLUTION INTRAMUSCULAR; INTRAVENOUS at 11:28:00

## 2020-06-25 RX ADMIN — ROCURONIUM BROMIDE 25 MG: 10 INJECTION, SOLUTION INTRAVENOUS at 10:54:00

## 2020-06-25 RX ADMIN — EPHEDRINE SULFATE 10 MG: 50 INJECTION, SOLUTION INTRAVENOUS at 11:29:00

## 2020-06-25 RX ADMIN — ONDANSETRON 4 MG: 2 INJECTION INTRAMUSCULAR; INTRAVENOUS at 10:54:00

## 2020-06-25 RX ADMIN — GLYCOPYRROLATE 0.4 MG: 0.2 INJECTION, SOLUTION INTRAMUSCULAR; INTRAVENOUS at 11:05:00

## 2020-06-25 RX ADMIN — KETOROLAC TROMETHAMINE 30 MG: 30 INJECTION, SOLUTION INTRAMUSCULAR; INTRAVENOUS at 11:40:00

## 2020-06-25 RX ADMIN — LIDOCAINE HYDROCHLORIDE 50 MG: 10 INJECTION, SOLUTION EPIDURAL; INFILTRATION; INTRACAUDAL; PERINEURAL at 10:52:00

## 2020-06-25 RX ADMIN — DEXAMETHASONE SODIUM PHOSPHATE 8 MG: 4 MG/ML VIAL (ML) INJECTION at 10:54:00

## 2020-06-25 RX ADMIN — NEOSTIGMINE METHYLSULFATE 2.5 MG: 1 INJECTION INTRAVENOUS at 11:05:00

## 2020-06-25 RX ADMIN — SODIUM CHLORIDE, SODIUM LACTATE, POTASSIUM CHLORIDE, CALCIUM CHLORIDE: 600; 310; 30; 20 INJECTION, SOLUTION INTRAVENOUS at 11:46:00

## 2020-06-25 NOTE — BRIEF OP NOTE
Pre-Operative Diagnosis: Cervical lymphadenitis [I88.9]  Deviated nasal septum [J34.2]     Post-Operative Diagnosis: Cervical lymphadenitis [I88. 9]Deviated nasal septum [J34.2]      Procedure Performed:   Procedure(s):  LEFT DEEP NECK MASS EXCISION    Surg

## 2020-06-25 NOTE — INTERVAL H&P NOTE
Pre-op Diagnosis: Cervical lymphadenitis [I88.9]  Deviated nasal septum [J34.2]    The above referenced H&P was reviewed by Sandrita Kline MD on 6/25/2020, the patient was examined and no significant changes have occurred in the patient's condition since

## 2020-06-25 NOTE — ANESTHESIA PROCEDURE NOTES
Airway  Date/Time: 6/25/2020 10:55 AM  Urgency: elective      General Information and Staff    Patient location during procedure: OR  Anesthesiologist: Alan Sanderson MD  Performed: anesthesiologist     Indications and Patient Condition  Indications for air

## 2020-06-25 NOTE — ANESTHESIA POSTPROCEDURE EVALUATION
[facility] Patient Status:  Hospital Outpatient Surgery   Age/Gender 50year old male MRN [id number]   [facility] SURGERY Attending Perez Macdonald MD   Hosp Day # 0 PCP Yesy Fierro DO       Anesthesia Post-op Note    Pro

## 2020-06-25 NOTE — ANESTHESIA PREPROCEDURE EVALUATION
PRE-OP EVALUATION    Patient Name: Sharonda Aldana    Pre-op Diagnosis: Cervical lymphadenitis [I88.9]  Deviated nasal septum [J34.2]    Procedure(s):  LEFT DEEP NECK MASS EXCISION    Surgeon(s) and Role:     Rita Stinson MD - Primary    Pre-op vitals rev 04/21/2020    RBC 5.74 (H) 04/21/2020    HGB 15.0 04/21/2020    HCT 46.6 04/21/2020    MCV 81.2 04/21/2020    MCH 26.1 04/21/2020    MCHC 32.2 04/21/2020    RDW 13.5 04/21/2020    .0 04/21/2020     Lab Results   Component Value Date     06/11/

## 2020-07-02 ENCOUNTER — TELEPHONE (OUTPATIENT)
Dept: HEMATOLOGY/ONCOLOGY | Facility: HOSPITAL | Age: 55
End: 2020-07-02

## 2020-07-02 PROBLEM — C81.90 HODGKIN LYMPHOMA (HCC): Status: ACTIVE | Noted: 2020-07-02

## 2020-07-02 NOTE — TELEPHONE ENCOUNTER
The patient would like to speak to you regarding how serious his Dx is and can he wait that long.  I told him that I received this message from the . He would like a call back from the RN

## 2020-07-02 NOTE — TELEPHONE ENCOUNTER
New Consult for Hogkins Lymphoma, referred by Dr. Gardenia Vogel, need help with an appt with Dr. Brigido Chinchilla.

## 2020-07-05 RX ORDER — ERGOCALCIFEROL 1.25 MG/1
50000 CAPSULE ORAL WEEKLY
Qty: 12 CAPSULE | Refills: 0 | OUTPATIENT
Start: 2020-07-05 | End: 2020-08-04

## 2020-07-06 ENCOUNTER — MED REC SCAN ONLY (OUTPATIENT)
Dept: FAMILY MEDICINE CLINIC | Facility: CLINIC | Age: 55
End: 2020-07-06

## 2020-07-07 ENCOUNTER — OFFICE VISIT (OUTPATIENT)
Dept: HEMATOLOGY/ONCOLOGY | Facility: HOSPITAL | Age: 55
End: 2020-07-07
Attending: INTERNAL MEDICINE
Payer: COMMERCIAL

## 2020-07-07 VITALS
HEART RATE: 68 BPM | SYSTOLIC BLOOD PRESSURE: 147 MMHG | WEIGHT: 166 LBS | HEIGHT: 70.39 IN | RESPIRATION RATE: 18 BRPM | OXYGEN SATURATION: 99 % | TEMPERATURE: 98 F | DIASTOLIC BLOOD PRESSURE: 79 MMHG | BODY MASS INDEX: 23.5 KG/M2

## 2020-07-07 DIAGNOSIS — C81.41: Primary | ICD-10-CM

## 2020-07-07 LAB
ALBUMIN SERPL-MCNC: 4.2 G/DL (ref 3.4–5)
ALBUMIN/GLOB SERPL: 1 {RATIO} (ref 1–2)
ALP LIVER SERPL-CCNC: 67 U/L (ref 45–117)
ALT SERPL-CCNC: 35 U/L (ref 16–61)
ANION GAP SERPL CALC-SCNC: 5 MMOL/L (ref 0–18)
AST SERPL-CCNC: 22 U/L (ref 15–37)
BASOPHILS # BLD AUTO: 0.05 X10(3) UL (ref 0–0.2)
BASOPHILS NFR BLD AUTO: 0.7 %
BILIRUB SERPL-MCNC: 0.5 MG/DL (ref 0.1–2)
BUN BLD-MCNC: 13 MG/DL (ref 7–18)
BUN/CREAT SERPL: 12.7 (ref 10–20)
CALCIUM BLD-MCNC: 9.4 MG/DL (ref 8.5–10.1)
CHLORIDE SERPL-SCNC: 103 MMOL/L (ref 98–112)
CO2 SERPL-SCNC: 29 MMOL/L (ref 21–32)
CREAT BLD-MCNC: 1.02 MG/DL (ref 0.7–1.3)
DEPRECATED RDW RBC AUTO: 35.4 FL (ref 35.1–46.3)
EOSINOPHIL # BLD AUTO: 0.2 X10(3) UL (ref 0–0.7)
EOSINOPHIL NFR BLD AUTO: 2.9 %
ERYTHROCYTE [DISTWIDTH] IN BLOOD BY AUTOMATED COUNT: 12.6 % (ref 11–15)
GLOBULIN PLAS-MCNC: 4.1 G/DL (ref 2.8–4.4)
GLUCOSE BLD-MCNC: 85 MG/DL (ref 70–99)
HCT VFR BLD AUTO: 45.7 % (ref 39–53)
HGB BLD-MCNC: 15.3 G/DL (ref 13–17.5)
IMM GRANULOCYTES # BLD AUTO: 0.02 X10(3) UL (ref 0–1)
IMM GRANULOCYTES NFR BLD: 0.3 %
LDH SERPL L TO P-CCNC: 201 U/L
LYMPHOCYTES # BLD AUTO: 3.06 X10(3) UL (ref 1–4)
LYMPHOCYTES NFR BLD AUTO: 44.1 %
M PROTEIN MFR SERPL ELPH: 8.3 G/DL (ref 6.4–8.2)
MCH RBC QN AUTO: 26.7 PG (ref 26–34)
MCHC RBC AUTO-ENTMCNC: 33.5 G/DL (ref 31–37)
MCV RBC AUTO: 79.6 FL (ref 80–100)
MONOCYTES # BLD AUTO: 0.64 X10(3) UL (ref 0.1–1)
MONOCYTES NFR BLD AUTO: 9.2 %
NEUTROPHILS # BLD AUTO: 2.97 X10 (3) UL (ref 1.5–7.7)
NEUTROPHILS # BLD AUTO: 2.97 X10(3) UL (ref 1.5–7.7)
NEUTROPHILS NFR BLD AUTO: 42.8 %
OSMOLALITY SERPL CALC.SUM OF ELEC: 283 MOSM/KG (ref 275–295)
PATIENT FASTING Y/N/NP: NO
PLATELET # BLD AUTO: 309 10(3)UL (ref 150–450)
POTASSIUM SERPL-SCNC: 3.6 MMOL/L (ref 3.5–5.1)
RBC # BLD AUTO: 5.74 X10(6)UL (ref 4.3–5.7)
SED RATE-ML: 9 MM/HR (ref 0–12)
SODIUM SERPL-SCNC: 137 MMOL/L (ref 136–145)
WBC # BLD AUTO: 6.9 X10(3) UL (ref 4–11)

## 2020-07-07 PROCEDURE — 99245 OFF/OP CONSLTJ NEW/EST HI 55: CPT | Performed by: INTERNAL MEDICINE

## 2020-07-07 NOTE — OPERATIVE REPORT
[facility]    PATIENT'S NAME: Peace Pickens   ATTENDING PHYSICIAN: Man Barriga M.D. OPERATING PHYSICIAN: Man Barriga M.D.    PATIENT ACCOUNT#:   [de-identified]    LOCATION:  [address] [place] [address] [address]  MEDICAL RECORD #:   [id number] on the surface. Patient was awoken from anesthetic, brought to Recovery in stable condition.      Dictated By Xavier Valencia M.D.  d: 07/07/2020 12:58:12  t: 07/07/2020 17:57:48  Job [id number]  JJD/    cc: Xavier Valencia M.D.

## 2020-07-08 NOTE — CONSULTS
Cancer Center Report of Consultation    Patient Name: Soraya Chavis   YOB: 1965   Medical Record Number: [id number]   CSN: [id number]   Consulting Physician: Renny Stallings MD  Referring Physician(s): Tate Gimenez  Date of Consultation: 7/7/202 The large atypical cells are strongly and diffusely positive for CD30 and show dim co-expression of PAX-5. A small subset show dim co-expression of CD20. The tumor cells are negative for CD45, CD15, and ALK1.   BCL-6 and MUM-1 show a high background, but a tobacco: Never Used    Substance and Sexual Activity      Alcohol use: Yes        Frequency: Monthly or less        Drinks per session: 1 or 2        Binge frequency: Never      Drug use: No      Sexual activity: Not on file    Lifestyle      Physical acti Negative for anorexia, fatigue, fevers, chills, night sweats and weight loss. Eyes: Negative for visual disturbance, irritation and redness. Respiratory: Negative for cough, hemoptysis, chest pain, or dyspnea.   Cardiovascular: Negative for angina, orthop 07/07/2020    HCT 45.7 07/07/2020    MCV 79.6 (L) 07/07/2020    MCH 26.7 07/07/2020    MCHC 33.5 07/07/2020    RDW 12.6 07/07/2020    .0 07/07/2020    MPV 9.5 07/06/2012     Lab Results   Component Value Date     07/07/2020    K 3.6 07/07/2020 nasopharynx, oropharynx, and hypopharynx appear morphologically intact, without discrete submucosal lesions, asymmetrical contour deformities, or eccentric soft tissue hyperenhancement, particularly along the Rosenmuller fossae, the retromolar trigones, or juxtacortical osteoporosis or discrete periarticular osseous erosions; additionally, the mandibular bone mineralization is intact, without destructive osteolysis or discrete blastic lesions.  =====  IMPRESSION:  1.   Extensive contiguous multizonal enlargem

## 2020-07-09 ENCOUNTER — APPOINTMENT (OUTPATIENT)
Dept: HEMATOLOGY/ONCOLOGY | Facility: HOSPITAL | Age: 55
End: 2020-07-09
Attending: INTERNAL MEDICINE
Payer: COMMERCIAL

## 2020-07-10 ENCOUNTER — LAB ENCOUNTER (OUTPATIENT)
Dept: LAB | Facility: HOSPITAL | Age: 55
End: 2020-07-10
Attending: INTERNAL MEDICINE
Payer: COMMERCIAL

## 2020-07-10 DIAGNOSIS — C81.41: ICD-10-CM

## 2020-07-10 DIAGNOSIS — Z01.812 PRE-PROCEDURE LAB EXAM: Primary | ICD-10-CM

## 2020-07-11 LAB — SARS-COV-2 RNA RESP QL NAA+PROBE: NOT DETECTED

## 2020-07-13 ENCOUNTER — APPOINTMENT (OUTPATIENT)
Dept: RESPIRATORY THERAPY | Facility: HOSPITAL | Age: 55
End: 2020-07-13
Attending: INTERNAL MEDICINE
Payer: COMMERCIAL

## 2020-07-13 ENCOUNTER — HOSPITAL ENCOUNTER (OUTPATIENT)
Dept: CV DIAGNOSTICS | Facility: HOSPITAL | Age: 55
Discharge: HOME | End: 2020-07-13
Attending: INTERNAL MEDICINE
Payer: COMMERCIAL

## 2020-07-13 DIAGNOSIS — C81.41: ICD-10-CM

## 2020-07-13 PROCEDURE — 94010 BREATHING CAPACITY TEST: CPT

## 2020-07-13 PROCEDURE — 93306 TTE W/DOPPLER COMPLETE: CPT | Performed by: INTERNAL MEDICINE

## 2020-07-13 PROCEDURE — 94726 PLETHYSMOGRAPHY LUNG VOLUMES: CPT

## 2020-07-13 PROCEDURE — 94729 DIFFUSING CAPACITY: CPT

## 2020-07-13 NOTE — PROCEDURES
Findings:  FEV1 is 2.79L, 79% predicted. FVC is 3.57L, 82% predicted. FEV1/ FVC ratio is 0.78. The flow-volume loop demonstrates a normal pattern. The TLC is 5.83L, 82% predicted. The residual volume 2.20L, 97% predicted.   The diffusion capacity is 76

## 2020-07-14 ENCOUNTER — SOCIAL WORK SERVICES (OUTPATIENT)
Dept: HEMATOLOGY/ONCOLOGY | Facility: HOSPITAL | Age: 55
End: 2020-07-14

## 2020-07-14 LAB
CD10 CELLS NFR SPEC: <1 %
CD11C CELLS NFR SPEC: 11 %
CD14 CELLS NFR SPEC: 3 %
CD19 CELLS NFR SPEC: 31 %
CD19/CD10 CELLS: <1 %
CD20 CELLS NFR SPEC: 32 %
CD22 CELLS NFR SPEC: 30 %
CD23 CELLS NFR SPEC: 3 %
CD3 CELLS NFR SPEC: 70 %
CD3+CD4+ CELLS NFR SPEC: 44 %
CD3+CD4+ CELLS/CD3+CD8+ CLL SPEC: 1.8
CD3+CD8+ CELLS NFR SPEC: 24 %
CD45 CELLS NFR SPEC: 100 %
CD5 CELLS NFR SPEC: 69 %
CD5/CD19 CELLS: 3 %
CD56 CELLS NFR SPEC: <1 %
CD7 CELLS NFR SPEC: 65 %
CELL SURF KAPPA/LAMBDA RATIO: 1.2
CELL SURF LAMBDA LIGHT CHAIN: 13 %
CELL SURFACE KAPPA LIGHT CHAIN: 16 %
FMC7 CELLS NFR SPEC: 28 %

## 2020-07-14 NOTE — PROGRESS NOTES
received a call from Carl at [address] [place] [address] requested documentation and a letter from Dr. Trinity Shi from 06/25/2020 and forward.  confirmed with Dr. Paulina Giordano that he supports medical leave.  Letter and do

## 2020-07-15 ENCOUNTER — HOSPITAL ENCOUNTER (OUTPATIENT)
Dept: NUCLEAR MEDICINE | Facility: HOSPITAL | Age: 55
Discharge: HOME | End: 2020-07-15
Attending: INTERNAL MEDICINE
Payer: COMMERCIAL

## 2020-07-15 DIAGNOSIS — C81.41: ICD-10-CM

## 2020-07-15 LAB — GLUCOSE BLD-MCNC: 112 MG/DL (ref 70–99)

## 2020-07-15 PROCEDURE — 82962 GLUCOSE BLOOD TEST: CPT

## 2020-07-15 PROCEDURE — 78815 PET IMAGE W/CT SKULL-THIGH: CPT | Performed by: INTERNAL MEDICINE

## 2020-07-17 ENCOUNTER — OFFICE VISIT (OUTPATIENT)
Dept: HEMATOLOGY/ONCOLOGY | Age: 55
End: 2020-07-17
Attending: INTERNAL MEDICINE
Payer: COMMERCIAL

## 2020-07-17 VITALS
SYSTOLIC BLOOD PRESSURE: 126 MMHG | RESPIRATION RATE: 18 BRPM | WEIGHT: 168.63 LBS | BODY MASS INDEX: 23.87 KG/M2 | HEIGHT: 70.39 IN | DIASTOLIC BLOOD PRESSURE: 79 MMHG | HEART RATE: 86 BPM | TEMPERATURE: 98 F

## 2020-07-17 DIAGNOSIS — C81.41: Primary | ICD-10-CM

## 2020-07-17 PROCEDURE — 99214 OFFICE O/P EST MOD 30 MIN: CPT | Performed by: INTERNAL MEDICINE

## 2020-07-20 NOTE — PROGRESS NOTES
IV Chemotherapy Education    Patient:Patel Garcia     Date: 7/21/2020   Diagnosis:  Hodgkin Lymphoma  Caregivers present: wife    Drug names:  Adriamycin, Bleomycin, Vinblastine, Dacarbazine    Myelosuppression  Decrease in wbc  Decrease in hgb  Decrease in p management, when to call provider and contact information.      Christian RODRÍGUEZ  Nurse Practitioner  [facility] [address] [place] [address]

## 2020-07-21 ENCOUNTER — OFFICE VISIT (OUTPATIENT)
Dept: HEMATOLOGY/ONCOLOGY | Facility: HOSPITAL | Age: 55
End: 2020-07-21
Attending: INTERNAL MEDICINE
Payer: COMMERCIAL

## 2020-07-21 ENCOUNTER — SOCIAL WORK SERVICES (OUTPATIENT)
Dept: HEMATOLOGY/ONCOLOGY | Facility: HOSPITAL | Age: 55
End: 2020-07-21

## 2020-07-21 VITALS
WEIGHT: 164.19 LBS | HEART RATE: 71 BPM | SYSTOLIC BLOOD PRESSURE: 120 MMHG | BODY MASS INDEX: 23.51 KG/M2 | HEIGHT: 70.24 IN | RESPIRATION RATE: 16 BRPM | OXYGEN SATURATION: 99 % | DIASTOLIC BLOOD PRESSURE: 77 MMHG

## 2020-07-21 DIAGNOSIS — Z71.89 OTHER SPECIFIED COUNSELING: ICD-10-CM

## 2020-07-21 DIAGNOSIS — C81.41: Primary | ICD-10-CM

## 2020-07-21 LAB
ALBUMIN SERPL-MCNC: 3.8 G/DL (ref 3.4–5)
ALBUMIN/GLOB SERPL: 1.2 {RATIO} (ref 1–2)
ALP LIVER SERPL-CCNC: 60 U/L (ref 45–117)
ALT SERPL-CCNC: 30 U/L (ref 16–61)
ANION GAP SERPL CALC-SCNC: 5 MMOL/L (ref 0–18)
AST SERPL-CCNC: 22 U/L (ref 15–37)
BASOPHILS # BLD AUTO: 0.05 X10(3) UL (ref 0–0.2)
BASOPHILS NFR BLD AUTO: 1 %
BILIRUB SERPL-MCNC: 0.4 MG/DL (ref 0.1–2)
BUN BLD-MCNC: 15 MG/DL (ref 7–18)
BUN/CREAT SERPL: 15.8 (ref 10–20)
CALCIUM BLD-MCNC: 8.8 MG/DL (ref 8.5–10.1)
CHLORIDE SERPL-SCNC: 108 MMOL/L (ref 98–112)
CO2 SERPL-SCNC: 26 MMOL/L (ref 21–32)
CREAT BLD-MCNC: 0.95 MG/DL (ref 0.7–1.3)
DEPRECATED RDW RBC AUTO: 36.2 FL (ref 35.1–46.3)
EOSINOPHIL # BLD AUTO: 0.27 X10(3) UL (ref 0–0.7)
EOSINOPHIL NFR BLD AUTO: 5.2 %
ERYTHROCYTE [DISTWIDTH] IN BLOOD BY AUTOMATED COUNT: 12.6 % (ref 11–15)
GLOBULIN PLAS-MCNC: 3.3 G/DL (ref 2.8–4.4)
GLUCOSE BLD-MCNC: 110 MG/DL (ref 70–99)
HCT VFR BLD AUTO: 43.1 % (ref 39–53)
HGB BLD-MCNC: 13.9 G/DL (ref 13–17.5)
IMM GRANULOCYTES # BLD AUTO: 0.01 X10(3) UL (ref 0–1)
IMM GRANULOCYTES NFR BLD: 0.2 %
LYMPHOCYTES # BLD AUTO: 2.55 X10(3) UL (ref 1–4)
LYMPHOCYTES NFR BLD AUTO: 48.8 %
M PROTEIN MFR SERPL ELPH: 7.1 G/DL (ref 6.4–8.2)
MCH RBC QN AUTO: 26 PG (ref 26–34)
MCHC RBC AUTO-ENTMCNC: 32.3 G/DL (ref 31–37)
MCV RBC AUTO: 80.7 FL (ref 80–100)
MONOCYTES # BLD AUTO: 0.52 X10(3) UL (ref 0.1–1)
MONOCYTES NFR BLD AUTO: 9.9 %
NEUTROPHILS # BLD AUTO: 1.83 X10 (3) UL (ref 1.5–7.7)
NEUTROPHILS # BLD AUTO: 1.83 X10(3) UL (ref 1.5–7.7)
NEUTROPHILS NFR BLD AUTO: 34.9 %
OSMOLALITY SERPL CALC.SUM OF ELEC: 289 MOSM/KG (ref 275–295)
PATIENT FASTING Y/N/NP: NO
PLATELET # BLD AUTO: 308 10(3)UL (ref 150–450)
POTASSIUM SERPL-SCNC: 3.5 MMOL/L (ref 3.5–5.1)
RBC # BLD AUTO: 5.34 X10(6)UL (ref 4.3–5.7)
SODIUM SERPL-SCNC: 139 MMOL/L (ref 136–145)
WBC # BLD AUTO: 5.2 X10(3) UL (ref 4–11)

## 2020-07-21 PROCEDURE — 96375 TX/PRO/DX INJ NEW DRUG ADDON: CPT

## 2020-07-21 PROCEDURE — 96411 CHEMO IV PUSH ADDL DRUG: CPT

## 2020-07-21 PROCEDURE — 96409 CHEMO IV PUSH SNGL DRUG: CPT

## 2020-07-21 PROCEDURE — 96401 CHEMO ANTI-NEOPL SQ/IM: CPT

## 2020-07-21 PROCEDURE — 99215 OFFICE O/P EST HI 40 MIN: CPT | Performed by: CLINICAL NURSE SPECIALIST

## 2020-07-21 PROCEDURE — 96413 CHEMO IV INFUSION 1 HR: CPT

## 2020-07-21 RX ORDER — LIDOCAINE HYDROCHLORIDE 20 MG/ML
10 SOLUTION OROPHARYNGEAL 4 TIMES DAILY PRN
Qty: 100 ML | Refills: 5 | Status: SHIPPED | OUTPATIENT
Start: 2020-07-21 | End: 2020-09-01

## 2020-07-21 RX ORDER — FLUCONAZOLE 100 MG/1
TABLET ORAL
Qty: 30 TABLET | Refills: 5 | Status: SHIPPED | OUTPATIENT
Start: 2020-07-21 | End: 2020-10-08

## 2020-07-21 RX ORDER — PROCHLORPERAZINE MALEATE 10 MG
10 TABLET ORAL EVERY 6 HOURS PRN
Qty: 30 TABLET | Refills: 3 | Status: SHIPPED | OUTPATIENT
Start: 2020-07-21 | End: 2020-09-01

## 2020-07-21 RX ORDER — ACYCLOVIR 400 MG/1
400 TABLET ORAL 2 TIMES DAILY
Qty: 30 TABLET | Refills: 5 | Status: SHIPPED | OUTPATIENT
Start: 2020-07-21 | End: 2020-10-08

## 2020-07-21 RX ORDER — DIPHENHYDRAMINE HYDROCHLORIDE AND LIDOCAINE HYDROCHLORIDE AND ALUMINUM HYDROXIDE AND MAGNESIUM HYDRO
10 KIT 4 TIMES DAILY PRN
Qty: 480 ML | Refills: 5 | Status: SHIPPED | OUTPATIENT
Start: 2020-07-21 | End: 2020-08-04 | Stop reason: ALTCHOICE

## 2020-07-21 RX ORDER — ONDANSETRON HYDROCHLORIDE 8 MG/1
8 TABLET, FILM COATED ORAL EVERY 8 HOURS PRN
Qty: 30 TABLET | Refills: 3 | Status: SHIPPED | OUTPATIENT
Start: 2020-07-21 | End: 2020-08-26

## 2020-07-21 RX ORDER — SULFAMETHOXAZOLE AND TRIMETHOPRIM 800; 160 MG/1; MG/1
TABLET ORAL
Qty: 20 TABLET | Refills: 5 | Status: SHIPPED | OUTPATIENT
Start: 2020-07-21 | End: 2020-10-08

## 2020-07-21 NOTE — PATIENT INSTRUCTIONS
Zofran (Ondanestron) 8 mg tablets. Take one tablet by mouth every 8 hours as needed. Compazine (Prochloraperazine) 10 mg tablets. Take one tablet by mouth every 6 hours as needed.     Alternate between Zofran and Compazine every 4 hours for the first 72

## 2020-07-21 NOTE — PROGRESS NOTES
met with Patient in treatment room for introduction and role explanation.   informed Patient of Evan Vega call; Patient verbalized understanding and stated that he had spoken with Mahi at [facility] and requested 8weeks off,

## 2020-07-21 NOTE — PROGRESS NOTES
Pt here for C1D1 Adriamycin/Bleomycin/Vinblastine/Dacarbazine.   Arrives Ambulating independently, accompanied by Self           Patient reports possible pregnancy since last therapy cycle: Not Applicable    Modifications in dose or schedule: no     Max He

## 2020-07-22 ENCOUNTER — TELEPHONE (OUTPATIENT)
Dept: HEMATOLOGY/ONCOLOGY | Facility: HOSPITAL | Age: 55
End: 2020-07-22

## 2020-07-22 NOTE — TELEPHONE ENCOUNTER
Toxicities: C1 D1 Doxorubicin/Bleomycin/Vinblastine/Dacarbazine on 7/21/2020    The patient said he is feeling good. No side effects so far. I encouraged him to please call the office if he is not feeling well or has any questions or concerns.  He thanked alessandra

## 2020-07-24 ENCOUNTER — TELEPHONE (OUTPATIENT)
Dept: HEMATOLOGY/ONCOLOGY | Facility: HOSPITAL | Age: 55
End: 2020-07-24

## 2020-07-24 NOTE — TELEPHONE ENCOUNTER
Sean Rebolledo from [facility] called asking for office notes for this patient so they can extend his short term med leave through 9/11. Her fax number is [phone]. Case number [id number].

## 2020-07-25 RX ORDER — HYDROCODONE BITARTRATE AND ACETAMINOPHEN 5; 325 MG/1; MG/1
1-2 TABLET ORAL EVERY 4 HOURS PRN
Qty: 60 TABLET | Refills: 0 | Status: SHIPPED | OUTPATIENT
Start: 2020-07-25 | End: 2020-09-01

## 2020-07-27 DIAGNOSIS — C81.41: Primary | ICD-10-CM

## 2020-07-27 DIAGNOSIS — Z51.11 ENCOUNTER FOR CHEMOTHERAPY MANAGEMENT: ICD-10-CM

## 2020-07-28 ENCOUNTER — LAB ENCOUNTER (OUTPATIENT)
Dept: LAB | Facility: HOSPITAL | Age: 55
End: 2020-07-28
Attending: CLINICAL NURSE SPECIALIST
Payer: COMMERCIAL

## 2020-07-28 ENCOUNTER — TELEPHONE (OUTPATIENT)
Dept: HEMATOLOGY/ONCOLOGY | Age: 55
End: 2020-07-28

## 2020-07-28 DIAGNOSIS — C85.90 LYMPHOMA (HCC): ICD-10-CM

## 2020-07-28 NOTE — TELEPHONE ENCOUNTER
[facility] is calling to see if we received paperwork for this patient's Short Term Disability, they are requesting an extention on his Disability.

## 2020-07-28 NOTE — PROGRESS NOTES
Patient presents with: Follow - Up: APN assessment Day 8    Pt is here for follow up; Day 8 ABVD. Pt has had whole body pain (muscles/joints/lower back) since 3rd day after treatment; minimal relief with norco. Restless sitting in one spot.  Eating and dri

## 2020-07-29 ENCOUNTER — SOCIAL WORK SERVICES (OUTPATIENT)
Dept: HEMATOLOGY/ONCOLOGY | Facility: HOSPITAL | Age: 55
End: 2020-07-29

## 2020-07-29 LAB — SARS-COV-2 RNA RESP QL NAA+PROBE: NOT DETECTED

## 2020-07-30 ENCOUNTER — HOSPITAL ENCOUNTER (OUTPATIENT)
Dept: INTERVENTIONAL RADIOLOGY/VASCULAR | Facility: HOSPITAL | Age: 55
Discharge: HOME | End: 2020-07-30
Attending: INTERNAL MEDICINE | Admitting: INTERNAL MEDICINE
Payer: COMMERCIAL

## 2020-07-30 VITALS
BODY MASS INDEX: 22.96 KG/M2 | HEIGHT: 71 IN | OXYGEN SATURATION: 96 % | SYSTOLIC BLOOD PRESSURE: 126 MMHG | RESPIRATION RATE: 15 BRPM | TEMPERATURE: 98 F | DIASTOLIC BLOOD PRESSURE: 82 MMHG | WEIGHT: 164 LBS | HEART RATE: 86 BPM

## 2020-07-30 DIAGNOSIS — C85.90 LYMPHOMA (HCC): Primary | ICD-10-CM

## 2020-07-30 DIAGNOSIS — C81.41: ICD-10-CM

## 2020-07-30 LAB — INR: 1 (ref 0.8–1.3)

## 2020-07-30 PROCEDURE — 0JH60WZ INSERTION OF TOTALLY IMPLANTABLE VASCULAR ACCESS DEVICE INTO CHEST SUBCUTANEOUS TISSUE AND FASCIA, OPEN APPROACH: ICD-10-PCS | Performed by: RADIOLOGY

## 2020-07-30 PROCEDURE — 85610 PROTHROMBIN TIME: CPT

## 2020-07-30 PROCEDURE — 02HV33Z INSERTION OF INFUSION DEVICE INTO SUPERIOR VENA CAVA, PERCUTANEOUS APPROACH: ICD-10-PCS | Performed by: RADIOLOGY

## 2020-07-30 PROCEDURE — 36561 INSERT TUNNELED CV CATH: CPT

## 2020-07-30 PROCEDURE — 76937 US GUIDE VASCULAR ACCESS: CPT

## 2020-07-30 PROCEDURE — B518ZZA FLUOROSCOPY OF SUPERIOR VENA CAVA, GUIDANCE: ICD-10-PCS | Performed by: RADIOLOGY

## 2020-07-30 PROCEDURE — 99153 MOD SED SAME PHYS/QHP EA: CPT

## 2020-07-30 PROCEDURE — 99152 MOD SED SAME PHYS/QHP 5/>YRS: CPT

## 2020-07-30 PROCEDURE — 77001 FLUOROGUIDE FOR VEIN DEVICE: CPT

## 2020-07-30 PROCEDURE — B543ZZA ULTRASONOGRAPHY OF RIGHT JUGULAR VEINS, GUIDANCE: ICD-10-PCS | Performed by: RADIOLOGY

## 2020-07-30 RX ORDER — HEPARIN SODIUM 5000 [USP'U]/ML
INJECTION, SOLUTION INTRAVENOUS; SUBCUTANEOUS
Status: COMPLETED
Start: 2020-07-30 | End: 2020-07-30

## 2020-07-30 RX ORDER — SODIUM CHLORIDE 9 MG/ML
INJECTION, SOLUTION INTRAVENOUS CONTINUOUS
Status: DISCONTINUED | OUTPATIENT
Start: 2020-07-30 | End: 2020-07-30

## 2020-07-30 RX ORDER — LIDOCAINE HYDROCHLORIDE AND EPINEPHRINE 10; 10 MG/ML; UG/ML
INJECTION, SOLUTION INFILTRATION; PERINEURAL
Status: COMPLETED
Start: 2020-07-30 | End: 2020-07-30

## 2020-07-30 RX ORDER — BACITRACIN 50000 [USP'U]/1
INJECTION, POWDER, LYOPHILIZED, FOR SOLUTION INTRAMUSCULAR
Status: COMPLETED
Start: 2020-07-30 | End: 2020-07-30

## 2020-07-30 RX ORDER — MIDAZOLAM HYDROCHLORIDE 1 MG/ML
INJECTION INTRAMUSCULAR; INTRAVENOUS
Status: COMPLETED
Start: 2020-07-30 | End: 2020-07-30

## 2020-07-30 RX ORDER — CEFAZOLIN SODIUM 1 G/3ML
INJECTION, POWDER, FOR SOLUTION INTRAMUSCULAR; INTRAVENOUS
Status: COMPLETED
Start: 2020-07-30 | End: 2020-07-30

## 2020-07-30 RX ORDER — LIDOCAINE HYDROCHLORIDE 10 MG/ML
INJECTION, SOLUTION INFILTRATION; PERINEURAL
Status: COMPLETED
Start: 2020-07-30 | End: 2020-07-30

## 2020-07-30 RX ADMIN — SODIUM CHLORIDE: 9 INJECTION, SOLUTION INTRAVENOUS at 08:30:00

## 2020-07-30 NOTE — PROGRESS NOTES
Patient received from cath lab s/p. .. Right chest Port cath placement site soft, no hematoma, dressing C/D/I. Pulses intact. Hemodynamics stable. Post-op orders received and implemented. Patient and family educated on  bedrest, verbalize understanding.

## 2020-07-30 NOTE — PROCEDURES
[facility]  Procedure Note    Keturah Dejesus Patient Status:  Outpatient in a Bed    6/15/1965 MRN [id number]   [facility] En[facility]   Hosp Day # 0 PCP Ronak Albarado DO     Procedure: right chest por

## 2020-08-03 DIAGNOSIS — C81.41: Primary | ICD-10-CM

## 2020-08-04 ENCOUNTER — OFFICE VISIT (OUTPATIENT)
Dept: HEMATOLOGY/ONCOLOGY | Facility: HOSPITAL | Age: 55
End: 2020-08-04
Attending: INTERNAL MEDICINE
Payer: COMMERCIAL

## 2020-08-04 VITALS
WEIGHT: 164 LBS | TEMPERATURE: 98 F | DIASTOLIC BLOOD PRESSURE: 76 MMHG | HEART RATE: 87 BPM | HEIGHT: 70.24 IN | SYSTOLIC BLOOD PRESSURE: 139 MMHG | RESPIRATION RATE: 18 BRPM | OXYGEN SATURATION: 98 % | BODY MASS INDEX: 23.48 KG/M2

## 2020-08-04 DIAGNOSIS — R50.9 FEVER, UNSPECIFIED FEVER CAUSE: ICD-10-CM

## 2020-08-04 DIAGNOSIS — R50.9 FEVER, UNSPECIFIED FEVER CAUSE: Primary | ICD-10-CM

## 2020-08-04 DIAGNOSIS — C81.41: Primary | ICD-10-CM

## 2020-08-04 DIAGNOSIS — F41.9 ANXIETY: ICD-10-CM

## 2020-08-04 DIAGNOSIS — C81.41: ICD-10-CM

## 2020-08-04 DIAGNOSIS — T45.1X5A CHEMOTHERAPY-INDUCED NEUTROPENIA (HCC): ICD-10-CM

## 2020-08-04 DIAGNOSIS — D70.1 CHEMOTHERAPY-INDUCED NEUTROPENIA (HCC): ICD-10-CM

## 2020-08-04 DIAGNOSIS — Z51.11 ENCOUNTER FOR CHEMOTHERAPY MANAGEMENT: ICD-10-CM

## 2020-08-04 DIAGNOSIS — J02.9 PHARYNGITIS, UNSPECIFIED ETIOLOGY: ICD-10-CM

## 2020-08-04 LAB
BASOPHILS # BLD: 0.05 X10(3) UL (ref 0–0.2)
BASOPHILS NFR BLD: 2 %
BILIRUB UR QL STRIP.AUTO: NEGATIVE
CLARITY UR REFRACT.AUTO: CLEAR
DEPRECATED RDW RBC AUTO: 35.6 FL (ref 35.1–46.3)
EOSINOPHIL # BLD: 0.1 X10(3) UL (ref 0–0.7)
EOSINOPHIL NFR BLD: 4 %
ERYTHROCYTE [DISTWIDTH] IN BLOOD BY AUTOMATED COUNT: 12.7 % (ref 11–15)
GLUCOSE UR STRIP.AUTO-MCNC: NEGATIVE MG/DL
HCT VFR BLD AUTO: 43 % (ref 39–53)
HGB BLD-MCNC: 14.3 G/DL (ref 13–17.5)
KETONES UR STRIP.AUTO-MCNC: NEGATIVE MG/DL
LEUKOCYTE ESTERASE UR QL STRIP.AUTO: NEGATIVE
LYMPHOCYTES NFR BLD: 1.68 X10(3) UL (ref 1–4)
LYMPHOCYTES NFR BLD: 67 %
MCH RBC QN AUTO: 26.4 PG (ref 26–34)
MCHC RBC AUTO-ENTMCNC: 33.3 G/DL (ref 31–37)
MCV RBC AUTO: 79.5 FL (ref 80–100)
MONOCYTES # BLD: 0.48 X10(3) UL (ref 0.1–1)
MONOCYTES NFR BLD: 19 %
MORPHOLOGY: NORMAL
MYELOCYTES # BLD: 0.03 X10(3) UL
MYELOCYTES NFR BLD: 1 %
NEUTROPHILS # BLD AUTO: 0.2 X10 (3) UL (ref 1.5–7.7)
NEUTROPHILS NFR BLD: 7 %
NEUTS HYPERSEG # BLD: 0.18 X10(3) UL (ref 1.5–7.7)
NITRITE UR QL STRIP.AUTO: NEGATIVE
PH UR STRIP.AUTO: 6 [PH] (ref 4.5–8)
PLATELET # BLD AUTO: 281 10(3)UL (ref 150–450)
PLATELET MORPHOLOGY: NORMAL
PROT UR STRIP.AUTO-MCNC: NEGATIVE MG/DL
RBC # BLD AUTO: 5.41 X10(6)UL (ref 4.3–5.7)
RBC UR QL AUTO: NEGATIVE
SP GR UR STRIP.AUTO: 1.01 (ref 1–1.03)
TOTAL CELLS COUNTED: 100
UROBILINOGEN UR STRIP.AUTO-MCNC: <2 MG/DL
WBC # BLD AUTO: 2.5 X10(3) UL (ref 4–11)

## 2020-08-04 PROCEDURE — 96413 CHEMO IV INFUSION 1 HR: CPT

## 2020-08-04 PROCEDURE — 96411 CHEMO IV PUSH ADDL DRUG: CPT

## 2020-08-04 PROCEDURE — 87086 URINE CULTURE/COLONY COUNT: CPT

## 2020-08-04 PROCEDURE — 87040 BLOOD CULTURE FOR BACTERIA: CPT

## 2020-08-04 PROCEDURE — 85025 COMPLETE CBC W/AUTO DIFF WBC: CPT

## 2020-08-04 PROCEDURE — 85007 BL SMEAR W/DIFF WBC COUNT: CPT

## 2020-08-04 PROCEDURE — 81003 URINALYSIS AUTO W/O SCOPE: CPT

## 2020-08-04 PROCEDURE — 99215 OFFICE O/P EST HI 40 MIN: CPT | Performed by: CLINICAL NURSE SPECIALIST

## 2020-08-04 PROCEDURE — 85027 COMPLETE CBC AUTOMATED: CPT

## 2020-08-04 PROCEDURE — 96375 TX/PRO/DX INJ NEW DRUG ADDON: CPT

## 2020-08-04 RX ORDER — SODIUM CHLORIDE 0.9 % (FLUSH) 0.9 %
10 SYRINGE (ML) INJECTION ONCE
Status: COMPLETED | OUTPATIENT
Start: 2020-08-04 | End: 2020-08-04

## 2020-08-04 RX ORDER — SODIUM CHLORIDE 0.9 % (FLUSH) 0.9 %
10 SYRINGE (ML) INJECTION ONCE
Status: CANCELLED | OUTPATIENT
Start: 2020-08-04

## 2020-08-04 RX ADMIN — SODIUM CHLORIDE 0.9 % (FLUSH) 10 ML: 0.9 % SYRINGE (ML) INJECTION at 13:40:00

## 2020-08-04 NOTE — PROGRESS NOTES
[facility] Progress Note    Patient Name: Renaldo Roth   YOB: 1965   Medical Record Number: [id number]   CSN: [id number]   Date of visit: 8/4/2020   Provider: Tula Krabbe, APRN  Referring Physician: Humza Lemons    Problem List:  Kwame Murders 0900)  Temp: 98 °F (36.7 °C) (08/04 0900)  Do Not Use - Resp Rate: --  SpO2: 98 % (08/04 0900)      Medications:    Current Outpatient Medications:   •  LORazepam 0.5 MG Oral Tab, Take 1 tablet (0.5 mg total) by mouth every 6 (six) hours as needed for Anxi exudates. Neck:  Supple, L anterior cervical LN 0.5cm without tenderness. Lungs:  Clear to auscultation bilaterally  CV:  Regular rate and rhythm  Abdomen:  Non-distended, normoactive bowel sounds, soft,nontender, no hepatosplenomegaly.   Extremities: above.    Fever:  Isolated episode. Pt self managed with Tylenol. This fever occurred 2 days after port placement. Discussed with Dr. Ihsan Ramires. 2 sets blood cultures (1 from port) and UA C&S.   Pt strongly advised to call if he develops temp 100 or gre

## 2020-08-04 NOTE — PROGRESS NOTES
Pt here for C1D15 ABVD.   Arrives Ambulating independently, accompanied by Spouse           Patient reports possible pregnancy since last therapy cycle: Not Applicable    Modifications in dose or schedule: No OK to proceed with chemo per ANNE Sierra w

## 2020-08-04 NOTE — PROGRESS NOTES
Patient presents with: Follow - Up: APN assessment prior to treatment    Pt is here for treatment; C1 D15 ABVD is expected. Pt continues to feel restless and have body pain (bilateral elbow pain is main concern). Denies N,V,D,C.  Sleeping is a little ella

## 2020-08-06 ENCOUNTER — OFFICE VISIT (OUTPATIENT)
Dept: HEMATOLOGY/ONCOLOGY | Facility: HOSPITAL | Age: 55
End: 2020-08-06
Attending: INTERNAL MEDICINE
Payer: COMMERCIAL

## 2020-08-06 ENCOUNTER — TELEPHONE (OUTPATIENT)
Dept: HEMATOLOGY/ONCOLOGY | Facility: HOSPITAL | Age: 55
End: 2020-08-06

## 2020-08-06 VITALS
BODY MASS INDEX: 23.48 KG/M2 | HEART RATE: 69 BPM | RESPIRATION RATE: 16 BRPM | SYSTOLIC BLOOD PRESSURE: 129 MMHG | DIASTOLIC BLOOD PRESSURE: 81 MMHG | OXYGEN SATURATION: 99 % | WEIGHT: 164 LBS | HEIGHT: 70.24 IN | TEMPERATURE: 98 F

## 2020-08-06 DIAGNOSIS — E87.6 HYPOKALEMIA: ICD-10-CM

## 2020-08-06 DIAGNOSIS — R06.6 HICCUPS: ICD-10-CM

## 2020-08-06 DIAGNOSIS — L85.3 DRY SKIN: ICD-10-CM

## 2020-08-06 DIAGNOSIS — C81.41: Primary | ICD-10-CM

## 2020-08-06 DIAGNOSIS — T45.1X5A CHEMOTHERAPY-INDUCED NEUTROPENIA (HCC): ICD-10-CM

## 2020-08-06 DIAGNOSIS — D70.1 CHEMOTHERAPY-INDUCED NEUTROPENIA (HCC): ICD-10-CM

## 2020-08-06 LAB
ANION GAP SERPL CALC-SCNC: 3 MMOL/L (ref 0–18)
BASOPHILS # BLD AUTO: 0 X10(3) UL (ref 0–0.2)
BASOPHILS NFR BLD AUTO: 0 %
BUN BLD-MCNC: 13 MG/DL (ref 7–18)
BUN/CREAT SERPL: 12.7 (ref 10–20)
CALCIUM BLD-MCNC: 8.4 MG/DL (ref 8.5–10.1)
CHLORIDE SERPL-SCNC: 107 MMOL/L (ref 98–112)
CO2 SERPL-SCNC: 29 MMOL/L (ref 21–32)
CREAT BLD-MCNC: 1.02 MG/DL (ref 0.7–1.3)
DEPRECATED RDW RBC AUTO: 36.2 FL (ref 35.1–46.3)
EOSINOPHIL # BLD AUTO: 0 X10(3) UL (ref 0–0.7)
EOSINOPHIL NFR BLD AUTO: 0 %
ERYTHROCYTE [DISTWIDTH] IN BLOOD BY AUTOMATED COUNT: 12.8 % (ref 11–15)
GLUCOSE BLD-MCNC: 107 MG/DL (ref 70–99)
HAV IGM SER QL: 2.2 MG/DL (ref 1.6–2.6)
HCT VFR BLD AUTO: 39.5 % (ref 39–53)
HGB BLD-MCNC: 13.3 G/DL (ref 13–17.5)
IMM GRANULOCYTES # BLD AUTO: 0.04 X10(3) UL (ref 0–1)
IMM GRANULOCYTES NFR BLD: 1.4 %
LYMPHOCYTES # BLD AUTO: 0.91 X10(3) UL (ref 1–4)
LYMPHOCYTES NFR BLD AUTO: 33 %
MCH RBC QN AUTO: 26.5 PG (ref 26–34)
MCHC RBC AUTO-ENTMCNC: 33.7 G/DL (ref 31–37)
MCV RBC AUTO: 78.7 FL (ref 80–100)
MONOCYTES # BLD AUTO: 0.41 X10(3) UL (ref 0.1–1)
MONOCYTES NFR BLD AUTO: 14.9 %
NEUTROPHILS # BLD AUTO: 1.4 X10 (3) UL (ref 1.5–7.7)
NEUTROPHILS # BLD AUTO: 1.4 X10(3) UL (ref 1.5–7.7)
NEUTROPHILS NFR BLD AUTO: 50.7 %
OSMOLALITY SERPL CALC.SUM OF ELEC: 289 MOSM/KG (ref 275–295)
PATIENT FASTING Y/N/NP: NO
PLATELET # BLD AUTO: 230 10(3)UL (ref 150–450)
POTASSIUM SERPL-SCNC: 3.3 MMOL/L (ref 3.5–5.1)
RBC # BLD AUTO: 5.02 X10(6)UL (ref 4.3–5.7)
SODIUM SERPL-SCNC: 139 MMOL/L (ref 136–145)
WBC # BLD AUTO: 2.8 X10(3) UL (ref 4–11)

## 2020-08-06 PROCEDURE — 96375 TX/PRO/DX INJ NEW DRUG ADDON: CPT

## 2020-08-06 PROCEDURE — 85025 COMPLETE CBC W/AUTO DIFF WBC: CPT

## 2020-08-06 PROCEDURE — 99215 OFFICE O/P EST HI 40 MIN: CPT | Performed by: CLINICAL NURSE SPECIALIST

## 2020-08-06 PROCEDURE — C9113 INJ PANTOPRAZOLE SODIUM, VIA: HCPCS | Performed by: CLINICAL NURSE SPECIALIST

## 2020-08-06 PROCEDURE — 80048 BASIC METABOLIC PNL TOTAL CA: CPT

## 2020-08-06 PROCEDURE — 83735 ASSAY OF MAGNESIUM: CPT

## 2020-08-06 PROCEDURE — 96374 THER/PROPH/DIAG INJ IV PUSH: CPT

## 2020-08-06 RX ORDER — SODIUM CHLORIDE 9 MG/ML
INJECTION, SOLUTION INTRAVENOUS ONCE
Status: CANCELLED
Start: 2020-08-06

## 2020-08-06 RX ORDER — PANTOPRAZOLE SODIUM 40 MG/1
40 TABLET, DELAYED RELEASE ORAL DAILY
Qty: 30 TABLET | Refills: 0 | Status: SHIPPED | OUTPATIENT
Start: 2020-08-06 | End: 2020-10-08

## 2020-08-06 RX ORDER — SODIUM CHLORIDE 0.9 % (FLUSH) 0.9 %
10 SYRINGE (ML) INJECTION ONCE
Status: CANCELLED | OUTPATIENT
Start: 2020-08-06

## 2020-08-06 RX ORDER — POTASSIUM CHLORIDE 20 MEQ/1
20 TABLET, EXTENDED RELEASE ORAL DAILY
Qty: 30 TABLET | Refills: 0 | Status: SHIPPED | OUTPATIENT
Start: 2020-08-06 | End: 2020-09-10

## 2020-08-06 RX ORDER — SODIUM CHLORIDE 0.9 % (FLUSH) 0.9 %
10 SYRINGE (ML) INJECTION ONCE
Status: COMPLETED | OUTPATIENT
Start: 2020-08-06 | End: 2020-08-06

## 2020-08-06 RX ADMIN — SODIUM CHLORIDE 0.9 % (FLUSH) 10 ML: 0.9 % SYRINGE (ML) INJECTION at 11:35:00

## 2020-08-06 NOTE — TELEPHONE ENCOUNTER
Spoke with patient, wants to be seen in [place], nervous, hiccups better, finger is the same, arranged to be seen in [place] with Jn Fillers at 11:15 this am, wanted sooner but no availability. Offered [place] but declined.

## 2020-08-06 NOTE — PROGRESS NOTES
[facility] Progress Note    Patient Name: Sharonda Aldana   YOB: 1965   Medical Record Number: [id number]   CSN: [id number]   Date of visit: 8/6/2020   Provider: HANS Stveens  Referring Physician: Brooke Gerard    Problem List:  Pete Flores •  HYDROcodone-acetaminophen 5-325 MG Oral Tab, Take 1-2 tablets by mouth every 4 (four) hours as needed for Pain., Disp: 60 tablet, Rfl: 0  •  Prochlorperazine Maleate (COMPAZINE) 10 mg tablet, Take 1 tablet (10 mg total) by mouth every 6 (six) hours as n Neuro:  CN 2-12 intact              Labs:      Results for Chuy Santoyo (MRN [id number]) as of 8/14/2020 14:06   Ref.  Range 8/6/2020 10:39   Glucose Latest Ref Range: 70 - 99 mg/dL 107 (H)   Sodium Latest Ref Range: 136 - 145 mmol/L 139   Potassium Latest Ref Lymphocytes % Latest Units: % 33.0   Monocytes % Latest Units: % 14.9   Eosinophils % Latest Units: % 0.0   Basophils % Latest Units: % 0.0   Immature Granulocyte % Latest Units: % 1.4         Impression/Plan:    Dry skin:  Discussed proper skin care.   He

## 2020-08-06 NOTE — PROGRESS NOTES
Patient presents with:  Nausea/vomiting: APN assessment - sick call  Pain    Pt is here for a sick call - pain in left index finger along with hiccups, nausea and vomiting. He was last treated on 8/4/20 C1 D15 ABVD.  Finger started hurting yesterday afterno

## 2020-08-06 NOTE — PROGRESS NOTES
Pt here for anti-emetics.   Arrives Ambulating independently, accompanied by Self           Patient reports possible pregnancy since last therapy cycle: Not Applicable    Modifications in dose or schedule: No     Frequency of blood return and site check thr

## 2020-08-10 ENCOUNTER — TELEPHONE (OUTPATIENT)
Dept: HEMATOLOGY/ONCOLOGY | Facility: HOSPITAL | Age: 55
End: 2020-08-10

## 2020-08-10 RX ORDER — LORAZEPAM 0.5 MG/1
TABLET ORAL
Qty: 30 TABLET | Refills: 0 | Status: SHIPPED | OUTPATIENT
Start: 2020-08-10 | End: 2020-08-26

## 2020-08-10 RX ORDER — LORAZEPAM 0.5 MG/1
0.5 TABLET ORAL EVERY 6 HOURS PRN
Qty: 30 TABLET | Refills: 0 | OUTPATIENT
Start: 2020-08-10

## 2020-08-10 NOTE — TELEPHONE ENCOUNTER
Patient called with c/o restlessness, insomnia and feeling down. He was instructed to come see Dr Cole Brewster tomorrow. Pt verbalizes understanding.

## 2020-08-11 ENCOUNTER — OFFICE VISIT (OUTPATIENT)
Dept: HEMATOLOGY/ONCOLOGY | Facility: HOSPITAL | Age: 55
End: 2020-08-11
Attending: INTERNAL MEDICINE
Payer: COMMERCIAL

## 2020-08-11 ENCOUNTER — APPOINTMENT (OUTPATIENT)
Dept: GENERAL RADIOLOGY | Facility: HOSPITAL | Age: 55
End: 2020-08-11
Attending: EMERGENCY MEDICINE
Payer: COMMERCIAL

## 2020-08-11 ENCOUNTER — HOSPITAL ENCOUNTER (EMERGENCY)
Facility: HOSPITAL | Age: 55
Discharge: HOME | End: 2020-08-11
Attending: EMERGENCY MEDICINE
Payer: COMMERCIAL

## 2020-08-11 ENCOUNTER — APPOINTMENT (OUTPATIENT)
Dept: CT IMAGING | Facility: HOSPITAL | Age: 55
End: 2020-08-11
Attending: EMERGENCY MEDICINE
Payer: COMMERCIAL

## 2020-08-11 VITALS
RESPIRATION RATE: 16 BRPM | DIASTOLIC BLOOD PRESSURE: 72 MMHG | TEMPERATURE: 98 F | SYSTOLIC BLOOD PRESSURE: 122 MMHG | HEART RATE: 72 BPM | WEIGHT: 162 LBS | BODY MASS INDEX: 23.19 KG/M2 | HEIGHT: 70.24 IN

## 2020-08-11 VITALS
DIASTOLIC BLOOD PRESSURE: 91 MMHG | HEIGHT: 71 IN | HEART RATE: 82 BPM | TEMPERATURE: 98 F | OXYGEN SATURATION: 100 % | WEIGHT: 162 LBS | SYSTOLIC BLOOD PRESSURE: 140 MMHG | BODY MASS INDEX: 22.68 KG/M2 | RESPIRATION RATE: 16 BRPM

## 2020-08-11 DIAGNOSIS — T45.1X5A CHEMOTHERAPY-INDUCED NEUTROPENIA (HCC): ICD-10-CM

## 2020-08-11 DIAGNOSIS — E55.9 VITAMIN D DEFICIENCY: ICD-10-CM

## 2020-08-11 DIAGNOSIS — R09.1 PLEURISY: Primary | ICD-10-CM

## 2020-08-11 DIAGNOSIS — E78.5 DYSLIPIDEMIA: ICD-10-CM

## 2020-08-11 DIAGNOSIS — C81.41: ICD-10-CM

## 2020-08-11 DIAGNOSIS — D70.1 CHEMOTHERAPY-INDUCED NEUTROPENIA (HCC): ICD-10-CM

## 2020-08-11 DIAGNOSIS — F06.4 ANXIETY DISORDER DUE TO MEDICAL CONDITION: ICD-10-CM

## 2020-08-11 DIAGNOSIS — C81.41: Primary | ICD-10-CM

## 2020-08-11 LAB
ALBUMIN SERPL-MCNC: 3.6 G/DL (ref 3.4–5)
ALBUMIN/GLOB SERPL: 1.1 {RATIO} (ref 1–2)
ALP LIVER SERPL-CCNC: 49 U/L (ref 45–117)
ALT SERPL-CCNC: 49 U/L (ref 16–61)
ANION GAP SERPL CALC-SCNC: 5 MMOL/L (ref 0–18)
AST SERPL-CCNC: 22 U/L (ref 15–37)
BASOPHILS # BLD AUTO: 0.01 X10(3) UL (ref 0–0.2)
BASOPHILS # BLD AUTO: 0.02 X10(3) UL (ref 0–0.2)
BASOPHILS NFR BLD AUTO: 0.3 %
BASOPHILS NFR BLD AUTO: 0.6 %
BILIRUB SERPL-MCNC: 0.2 MG/DL (ref 0.1–2)
BUN BLD-MCNC: 13 MG/DL (ref 7–18)
BUN/CREAT SERPL: 12.3 (ref 10–20)
CALCIUM BLD-MCNC: 8.6 MG/DL (ref 8.5–10.1)
CHLORIDE SERPL-SCNC: 102 MMOL/L (ref 98–112)
CHOLEST SMN-MCNC: 229 MG/DL (ref ?–200)
CO2 SERPL-SCNC: 29 MMOL/L (ref 21–32)
CREAT BLD-MCNC: 1.06 MG/DL (ref 0.7–1.3)
D-DIMER: 0.59 UG/ML FEU (ref ?–0.53)
DEPRECATED RDW RBC AUTO: 34.9 FL (ref 35.1–46.3)
DEPRECATED RDW RBC AUTO: 35.2 FL (ref 35.1–46.3)
EOSINOPHIL # BLD AUTO: 0.03 X10(3) UL (ref 0–0.7)
EOSINOPHIL # BLD AUTO: 0.05 X10(3) UL (ref 0–0.7)
EOSINOPHIL NFR BLD AUTO: 0.9 %
EOSINOPHIL NFR BLD AUTO: 1.3 %
ERYTHROCYTE [DISTWIDTH] IN BLOOD BY AUTOMATED COUNT: 12.5 % (ref 11–15)
ERYTHROCYTE [DISTWIDTH] IN BLOOD BY AUTOMATED COUNT: 12.5 % (ref 11–15)
GLOBULIN PLAS-MCNC: 3.4 G/DL (ref 2.8–4.4)
GLUCOSE BLD-MCNC: 103 MG/DL (ref 70–99)
HCT VFR BLD AUTO: 41.1 % (ref 39–53)
HCT VFR BLD AUTO: 41.2 % (ref 39–53)
HDLC SERPL-MCNC: 32 MG/DL (ref 40–59)
HGB BLD-MCNC: 13.8 G/DL (ref 13–17.5)
HGB BLD-MCNC: 13.9 G/DL (ref 13–17.5)
IMM GRANULOCYTES # BLD AUTO: 0.02 X10(3) UL (ref 0–1)
IMM GRANULOCYTES # BLD AUTO: 0.03 X10(3) UL (ref 0–1)
IMM GRANULOCYTES NFR BLD: 0.5 %
IMM GRANULOCYTES NFR BLD: 0.9 %
LDLC SERPL DIRECT ASSAY-MCNC: 99 MG/DL (ref ?–100)
LYMPHOCYTES # BLD AUTO: 1.95 X10(3) UL (ref 1–4)
LYMPHOCYTES # BLD AUTO: 2.45 X10(3) UL (ref 1–4)
LYMPHOCYTES NFR BLD AUTO: 56.2 %
LYMPHOCYTES NFR BLD AUTO: 64.3 %
M PROTEIN MFR SERPL ELPH: 7 G/DL (ref 6.4–8.2)
MCH RBC QN AUTO: 26.1 PG (ref 26–34)
MCH RBC QN AUTO: 26.4 PG (ref 26–34)
MCHC RBC AUTO-ENTMCNC: 33.5 G/DL (ref 31–37)
MCHC RBC AUTO-ENTMCNC: 33.8 G/DL (ref 31–37)
MCV RBC AUTO: 78 FL (ref 80–100)
MCV RBC AUTO: 78.1 FL (ref 80–100)
MONOCYTES # BLD AUTO: 0.07 X10(3) UL (ref 0.1–1)
MONOCYTES # BLD AUTO: 0.08 X10(3) UL (ref 0.1–1)
MONOCYTES NFR BLD AUTO: 2 %
MONOCYTES NFR BLD AUTO: 2.1 %
NEUTROPHILS # BLD AUTO: 1.2 X10 (3) UL (ref 1.5–7.7)
NEUTROPHILS # BLD AUTO: 1.2 X10(3) UL (ref 1.5–7.7)
NEUTROPHILS # BLD AUTO: 1.37 X10 (3) UL (ref 1.5–7.7)
NEUTROPHILS # BLD AUTO: 1.37 X10(3) UL (ref 1.5–7.7)
NEUTROPHILS NFR BLD AUTO: 31.5 %
NEUTROPHILS NFR BLD AUTO: 39.4 %
NONHDLC SERPL-MCNC: 197 MG/DL (ref ?–130)
NT-PROBNP SERPL-MCNC: 15 PG/ML (ref ?–125)
OSMOLALITY SERPL CALC.SUM OF ELEC: 282 MOSM/KG (ref 275–295)
PATIENT FASTING Y/N/NP: NO
PLATELET # BLD AUTO: 275 10(3)UL (ref 150–450)
PLATELET # BLD AUTO: 276 10(3)UL (ref 150–450)
POTASSIUM SERPL-SCNC: 3.9 MMOL/L (ref 3.5–5.1)
RBC # BLD AUTO: 5.26 X10(6)UL (ref 4.3–5.7)
RBC # BLD AUTO: 5.28 X10(6)UL (ref 4.3–5.7)
SODIUM SERPL-SCNC: 136 MMOL/L (ref 136–145)
TRIGL SERPL-MCNC: 729 MG/DL (ref 30–149)
TROPONIN I SERPL-MCNC: <0.045 NG/ML (ref ?–0.04)
VIT D+METAB SERPL-MCNC: 24.1 NG/ML (ref 30–100)
WBC # BLD AUTO: 3.5 X10(3) UL (ref 4–11)
WBC # BLD AUTO: 3.8 X10(3) UL (ref 4–11)

## 2020-08-11 PROCEDURE — 83880 ASSAY OF NATRIURETIC PEPTIDE: CPT | Performed by: EMERGENCY MEDICINE

## 2020-08-11 PROCEDURE — 96375 TX/PRO/DX INJ NEW DRUG ADDON: CPT

## 2020-08-11 PROCEDURE — 99285 EMERGENCY DEPT VISIT HI MDM: CPT

## 2020-08-11 PROCEDURE — 82306 VITAMIN D 25 HYDROXY: CPT

## 2020-08-11 PROCEDURE — 84484 ASSAY OF TROPONIN QUANT: CPT | Performed by: EMERGENCY MEDICINE

## 2020-08-11 PROCEDURE — 80061 LIPID PANEL: CPT

## 2020-08-11 PROCEDURE — 85379 FIBRIN DEGRADATION QUANT: CPT | Performed by: EMERGENCY MEDICINE

## 2020-08-11 PROCEDURE — 71275 CT ANGIOGRAPHY CHEST: CPT | Performed by: EMERGENCY MEDICINE

## 2020-08-11 PROCEDURE — 85025 COMPLETE CBC W/AUTO DIFF WBC: CPT | Performed by: EMERGENCY MEDICINE

## 2020-08-11 PROCEDURE — 96374 THER/PROPH/DIAG INJ IV PUSH: CPT

## 2020-08-11 PROCEDURE — 36591 DRAW BLOOD OFF VENOUS DEVICE: CPT

## 2020-08-11 PROCEDURE — 83721 ASSAY OF BLOOD LIPOPROTEIN: CPT

## 2020-08-11 PROCEDURE — 93010 ELECTROCARDIOGRAM REPORT: CPT

## 2020-08-11 PROCEDURE — 93005 ELECTROCARDIOGRAM TRACING: CPT

## 2020-08-11 PROCEDURE — 99214 OFFICE O/P EST MOD 30 MIN: CPT | Performed by: INTERNAL MEDICINE

## 2020-08-11 PROCEDURE — 85025 COMPLETE CBC W/AUTO DIFF WBC: CPT

## 2020-08-11 PROCEDURE — 80053 COMPREHEN METABOLIC PANEL: CPT | Performed by: EMERGENCY MEDICINE

## 2020-08-11 PROCEDURE — 71045 X-RAY EXAM CHEST 1 VIEW: CPT | Performed by: EMERGENCY MEDICINE

## 2020-08-11 RX ORDER — HYDROCODONE BITARTRATE AND ACETAMINOPHEN 5; 325 MG/1; MG/1
1-2 TABLET ORAL EVERY 4 HOURS PRN
Qty: 60 TABLET | Refills: 0 | OUTPATIENT
Start: 2020-08-11

## 2020-08-11 RX ORDER — TRAZODONE HYDROCHLORIDE 50 MG/1
50 TABLET ORAL NIGHTLY
Qty: 30 TABLET | Refills: 1 | Status: SHIPPED | OUTPATIENT
Start: 2020-08-11 | End: 2020-10-08

## 2020-08-11 RX ORDER — KETOROLAC TROMETHAMINE 30 MG/ML
15 INJECTION, SOLUTION INTRAMUSCULAR; INTRAVENOUS ONCE
Status: COMPLETED | OUTPATIENT
Start: 2020-08-11 | End: 2020-08-11

## 2020-08-11 RX ORDER — DIPHENHYDRAMINE HYDROCHLORIDE 50 MG/ML
25 INJECTION INTRAMUSCULAR; INTRAVENOUS ONCE
Status: COMPLETED | OUTPATIENT
Start: 2020-08-11 | End: 2020-08-11

## 2020-08-11 NOTE — ED PROVIDER NOTES
Patient Seen in: [facility] Emergency Department      History   Patient presents with:  Dyspnea CHELY SOB    Stated Complaint: sob, cancer patient     HPI    53-year-old male complaint of shortness of breath he has a history of lymphoma he last receiv 68   Temp 97.6 °F (36.4 °C) (Temporal)   Resp 16   Ht 180.3 cm (5' 11\")   Wt 73.5 kg   SpO2 100%   BMI 22.59 kg/m²         Physical Exam    Alert and oriented ×3 in no acute distress. HEENT exam within normal limits.   Neck supple without lymphadenopathy cell 3.8  Ct Angiography, Chest (cpt=71275)    Result Date: 8/11/2020  PROCEDURE:  CT ANGIOGRAPHY, CHEST (CPT=71275)  COMPARISON:  None.   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 The entire system was flushed with heparinized saline. There were no complications.   Intravenous conscious sedation was administered by the radiology nursing staff under my direct supervision for a period

## 2020-08-11 NOTE — ED INITIAL ASSESSMENT (HPI)
PT TO ED FROM HOME WITH C/O SOB THAT STARTED 15 MIN AGO, WAS SEEN IN DR ARRINGTON'S OFFICE TODAY STOPPED COMPEZINE,  LAST CHEMO TX LAST Tuesday, HX OF LYMPHOMA.

## 2020-08-11 NOTE — PROGRESS NOTES
Cancer Center Progress Note    Problem List:      Patient Active Problem List:     Acute sinusitis, unspecified     Acute bronchitis     Acute pharyngitis     Screening for thyroid disorder     Screening for other and unspecified endocrine, nutritional, me Vitamin D deficiency        Past Surgical History:   Procedure Laterality Date   • APPENDECTOMY  age 18   • CERVICAL LYMPH NODE BX Left 6/25/2020    Performed by Kelvin Garcia MD at [facility] MAIN OR   • TONSILLECTOMY N/A 5/27/2016    Performed by Keely Armstrong Oral Cap, Start one capsule by mouth daily after completing prescription Vitamin D, Disp: , Rfl:           Vital Signs:      Height: 178.4 cm (5' 10.24\") (08/11 0846)  Weight: 73.5 kg (162 lb) (08/11 0846)  BSA (Calculated - sq m): 1.91 sq meters (08/11 0 reviewed at this visit:    PET/CT on 7/15/2020:  FINDINGS:    ABNORMAL FOCI:  Abnormal FDG activity involves multiple left neck and supraclavicular nodes, level 2 through 5 with a maximum SUV of 8.6 involving a 2.1 x 1.3 cm submandibular node.   OTHER:

## 2020-08-12 DIAGNOSIS — E55.9 VITAMIN D DEFICIENCY: Primary | ICD-10-CM

## 2020-08-12 RX ORDER — ERGOCALCIFEROL 1.25 MG/1
50000 CAPSULE ORAL WEEKLY
Qty: 8 CAPSULE | Refills: 0 | Status: SHIPPED | OUTPATIENT
Start: 2020-08-12 | End: 2020-09-11

## 2020-08-14 NOTE — PROGRESS NOTES
[facility] Progress Note    Patient Name: Renaldo Roth   YOB: 1965   Medical Record Number: [id number]   CSN: [id number]   Date of visit: 8/14/2020   Provider: Tula Krabbe, APRN  Referring Physician: Humza Lemons    Problem List:  Jonathan Esteban total) by mouth every 8 (eight) hours as needed for Nausea., Disp: 30 tablet, Rfl: 3  •  Lidocaine Viscous HCl 2 % Mouth/Throat Solution, Take 10 mL by mouth 4 (four) times daily as needed for Pain.  (Patient not taking: Reported on 8/11/2020 ), Disp: 100 m Non-distended, normoactive bowel sounds, soft,nontender, no hepatosplenomegaly. Extremities:  No edema, no tenderness  Neuro:  CN 2-12 intact    Labs:    Results for Adam Washburn (MRN [id number]) as of 8/14/2020 17:36   Ref.  Range 7/28/2020 14:13   Glucose L Neutrophils Absolute Latest Ref Range: 1.50 - 7.70 x10(3) uL 1.32 (L)   Lymphocytes Absolute Latest Ref Range: 1.00 - 4.00 x10(3) uL 2.19   Monocytes Absolute Latest Ref Range: 0.10 - 1.00 x10(3) uL 0.07 (L)   Eosinophils Absolute Latest Ref Range: 0.00

## 2020-08-17 LAB
ATRIAL RATE: 77 BPM
P AXIS: 28 DEGREES
P-R INTERVAL: 158 MS
Q-T INTERVAL: 398 MS
QRS DURATION: 84 MS
QTC CALCULATION (BEZET): 450 MS
R AXIS: -8 DEGREES
T AXIS: 17 DEGREES
VENTRICULAR RATE: 77 BPM

## 2020-08-18 ENCOUNTER — OFFICE VISIT (OUTPATIENT)
Dept: HEMATOLOGY/ONCOLOGY | Facility: HOSPITAL | Age: 55
End: 2020-08-18
Attending: INTERNAL MEDICINE
Payer: COMMERCIAL

## 2020-08-18 VITALS
WEIGHT: 163.19 LBS | RESPIRATION RATE: 16 BRPM | TEMPERATURE: 97 F | OXYGEN SATURATION: 98 % | HEIGHT: 70.24 IN | SYSTOLIC BLOOD PRESSURE: 122 MMHG | HEART RATE: 78 BPM | DIASTOLIC BLOOD PRESSURE: 78 MMHG | BODY MASS INDEX: 23.36 KG/M2

## 2020-08-18 DIAGNOSIS — C81.41: Primary | ICD-10-CM

## 2020-08-18 DIAGNOSIS — D70.1 CHEMOTHERAPY-INDUCED NEUTROPENIA (HCC): ICD-10-CM

## 2020-08-18 DIAGNOSIS — K12.30 MUCOSITIS: ICD-10-CM

## 2020-08-18 DIAGNOSIS — T45.1X5A CHEMOTHERAPY-INDUCED NEUTROPENIA (HCC): ICD-10-CM

## 2020-08-18 DIAGNOSIS — F06.4 ANXIETY DISORDER DUE TO MEDICAL CONDITION: ICD-10-CM

## 2020-08-18 LAB
ALBUMIN SERPL-MCNC: 3.8 G/DL (ref 3.4–5)
ALBUMIN/GLOB SERPL: 1.1 {RATIO} (ref 1–2)
ALP LIVER SERPL-CCNC: 49 U/L (ref 45–117)
ALT SERPL-CCNC: 44 U/L (ref 16–61)
ANION GAP SERPL CALC-SCNC: 3 MMOL/L (ref 0–18)
AST SERPL-CCNC: 18 U/L (ref 15–37)
BASOPHILS # BLD: 0 X10(3) UL (ref 0–0.2)
BASOPHILS NFR BLD: 0 %
BILIRUB SERPL-MCNC: 0.3 MG/DL (ref 0.1–2)
BUN BLD-MCNC: 15 MG/DL (ref 7–18)
BUN/CREAT SERPL: 14.2 (ref 10–20)
CALCIUM BLD-MCNC: 9.1 MG/DL (ref 8.5–10.1)
CHLORIDE SERPL-SCNC: 104 MMOL/L (ref 98–112)
CO2 SERPL-SCNC: 30 MMOL/L (ref 21–32)
CREAT BLD-MCNC: 1.06 MG/DL (ref 0.7–1.3)
DEPRECATED RDW RBC AUTO: 39.1 FL (ref 35.1–46.3)
EOSINOPHIL # BLD: 0.06 X10(3) UL (ref 0–0.7)
EOSINOPHIL NFR BLD: 2 %
ERYTHROCYTE [DISTWIDTH] IN BLOOD BY AUTOMATED COUNT: 13.4 % (ref 11–15)
GLOBULIN PLAS-MCNC: 3.6 G/DL (ref 2.8–4.4)
GLUCOSE BLD-MCNC: 107 MG/DL (ref 70–99)
HCT VFR BLD AUTO: 40 % (ref 39–53)
HGB BLD-MCNC: 13 G/DL (ref 13–17.5)
LYMPHOCYTES NFR BLD: 1.86 X10(3) UL (ref 1–4)
LYMPHOCYTES NFR BLD: 62 %
M PROTEIN MFR SERPL ELPH: 7.4 G/DL (ref 6.4–8.2)
MCH RBC QN AUTO: 26.7 PG (ref 26–34)
MCHC RBC AUTO-ENTMCNC: 32.5 G/DL (ref 31–37)
MCV RBC AUTO: 82.3 FL (ref 80–100)
METAMYELOCYTES # BLD: 0.03 X10(3) UL
METAMYELOCYTES NFR BLD: 1 %
MONOCYTES # BLD: 0.42 X10(3) UL (ref 0.1–1)
MONOCYTES NFR BLD: 14 %
MORPHOLOGY: NORMAL
MYELOCYTES # BLD: 0.03 X10(3) UL
MYELOCYTES NFR BLD: 1 %
NEUTROPHILS # BLD AUTO: 0.42 X10 (3) UL (ref 1.5–7.7)
NEUTROPHILS NFR BLD: 18 %
NEUTS BAND NFR BLD: 2 %
NEUTS HYPERSEG # BLD: 0.6 X10(3) UL (ref 1.5–7.7)
OSMOLALITY SERPL CALC.SUM OF ELEC: 285 MOSM/KG (ref 275–295)
PATIENT FASTING Y/N/NP: NO
PLATELET # BLD AUTO: 265 10(3)UL (ref 150–450)
PLATELET MORPHOLOGY: NORMAL
POTASSIUM SERPL-SCNC: 3.8 MMOL/L (ref 3.5–5.1)
RBC # BLD AUTO: 4.86 X10(6)UL (ref 4.3–5.7)
SODIUM SERPL-SCNC: 137 MMOL/L (ref 136–145)
TOTAL CELLS COUNTED: 100
WBC # BLD AUTO: 3 X10(3) UL (ref 4–11)

## 2020-08-18 PROCEDURE — 85025 COMPLETE CBC W/AUTO DIFF WBC: CPT

## 2020-08-18 PROCEDURE — 96375 TX/PRO/DX INJ NEW DRUG ADDON: CPT

## 2020-08-18 PROCEDURE — 96411 CHEMO IV PUSH ADDL DRUG: CPT

## 2020-08-18 PROCEDURE — 85007 BL SMEAR W/DIFF WBC COUNT: CPT

## 2020-08-18 PROCEDURE — 99215 OFFICE O/P EST HI 40 MIN: CPT | Performed by: INTERNAL MEDICINE

## 2020-08-18 PROCEDURE — 80053 COMPREHEN METABOLIC PANEL: CPT

## 2020-08-18 PROCEDURE — 85027 COMPLETE CBC AUTOMATED: CPT

## 2020-08-18 PROCEDURE — 96413 CHEMO IV INFUSION 1 HR: CPT

## 2020-08-18 NOTE — PROGRESS NOTES
Cancer Center Progress Note    Problem List:      Patient Active Problem List:     Acute sinusitis, unspecified     Acute bronchitis     Acute pharyngitis     Screening for thyroid disorder     Screening for other and unspecified endocrine, nutritional, me • Visual impairment     glasses   • Vitamin D deficiency        Past Surgical History:   Procedure Laterality Date   • APPENDECTOMY  age 18   • CERVICAL LYMPH NODE BX Left 6/25/2020    Performed by Brielle Jefferson MD at [facility] MAIN OR   • TONSILLECTOMY N/ the cervical, supraclavicular, or axillary regions. Psychiatric: The patient's mood is calm and appropriate for this visit.       Labs reviewed at this visit:     Lab Results   Component Value Date    WBC 3.8 (L) 08/11/2020    RBC 5.26 08/11/2020    HGB 13 cycle if he has resolution of the disease.          Nura Fuller MD

## 2020-08-25 ENCOUNTER — APPOINTMENT (OUTPATIENT)
Dept: HEMATOLOGY/ONCOLOGY | Facility: HOSPITAL | Age: 55
End: 2020-08-25
Attending: INTERNAL MEDICINE
Payer: COMMERCIAL

## 2020-08-25 DIAGNOSIS — C81.41: Primary | ICD-10-CM

## 2020-08-25 LAB
ALBUMIN SERPL-MCNC: 3.7 G/DL (ref 3.4–5)
ALBUMIN/GLOB SERPL: 1.1 {RATIO} (ref 1–2)
ALP LIVER SERPL-CCNC: 47 U/L (ref 45–117)
ALT SERPL-CCNC: 58 U/L (ref 16–61)
ANION GAP SERPL CALC-SCNC: 6 MMOL/L (ref 0–18)
AST SERPL-CCNC: 23 U/L (ref 15–37)
BASOPHILS # BLD AUTO: 0.04 X10(3) UL (ref 0–0.2)
BASOPHILS NFR BLD AUTO: 0.9 %
BILIRUB SERPL-MCNC: 0.3 MG/DL (ref 0.1–2)
BUN BLD-MCNC: 14 MG/DL (ref 7–18)
BUN/CREAT SERPL: 12 (ref 10–20)
CALCIUM BLD-MCNC: 9.4 MG/DL (ref 8.5–10.1)
CHLORIDE SERPL-SCNC: 102 MMOL/L (ref 98–112)
CO2 SERPL-SCNC: 28 MMOL/L (ref 21–32)
CREAT BLD-MCNC: 1.17 MG/DL (ref 0.7–1.3)
DEPRECATED RDW RBC AUTO: 37 FL (ref 35.1–46.3)
EOSINOPHIL # BLD AUTO: 0.07 X10(3) UL (ref 0–0.7)
EOSINOPHIL NFR BLD AUTO: 1.6 %
ERYTHROCYTE [DISTWIDTH] IN BLOOD BY AUTOMATED COUNT: 13.2 % (ref 11–15)
GLOBULIN PLAS-MCNC: 3.5 G/DL (ref 2.8–4.4)
GLUCOSE BLD-MCNC: 123 MG/DL (ref 70–99)
HCT VFR BLD AUTO: 38 % (ref 39–53)
HGB BLD-MCNC: 12.7 G/DL (ref 13–17.5)
IMM GRANULOCYTES # BLD AUTO: 0.05 X10(3) UL (ref 0–1)
IMM GRANULOCYTES NFR BLD: 1.1 %
LYMPHOCYTES # BLD AUTO: 1.64 X10(3) UL (ref 1–4)
LYMPHOCYTES NFR BLD AUTO: 36.4 %
M PROTEIN MFR SERPL ELPH: 7.2 G/DL (ref 6.4–8.2)
MCH RBC QN AUTO: 26.3 PG (ref 26–34)
MCHC RBC AUTO-ENTMCNC: 33.4 G/DL (ref 31–37)
MCV RBC AUTO: 78.8 FL (ref 80–100)
MONOCYTES # BLD AUTO: 0.11 X10(3) UL (ref 0.1–1)
MONOCYTES NFR BLD AUTO: 2.4 %
NEUTROPHILS # BLD AUTO: 2.6 X10 (3) UL (ref 1.5–7.7)
NEUTROPHILS # BLD AUTO: 2.6 X10(3) UL (ref 1.5–7.7)
NEUTROPHILS NFR BLD AUTO: 57.6 %
OSMOLALITY SERPL CALC.SUM OF ELEC: 284 MOSM/KG (ref 275–295)
PATIENT FASTING Y/N/NP: NO
PLATELET # BLD AUTO: 224 10(3)UL (ref 150–450)
POTASSIUM SERPL-SCNC: 3.9 MMOL/L (ref 3.5–5.1)
RBC # BLD AUTO: 4.82 X10(6)UL (ref 4.3–5.7)
SODIUM SERPL-SCNC: 136 MMOL/L (ref 136–145)
WBC # BLD AUTO: 4.5 X10(3) UL (ref 4–11)

## 2020-08-25 PROCEDURE — 36591 DRAW BLOOD OFF VENOUS DEVICE: CPT

## 2020-08-25 PROCEDURE — 85025 COMPLETE CBC W/AUTO DIFF WBC: CPT

## 2020-08-25 PROCEDURE — 80053 COMPREHEN METABOLIC PANEL: CPT

## 2020-08-26 DIAGNOSIS — C81.41: ICD-10-CM

## 2020-08-27 RX ORDER — ONDANSETRON HYDROCHLORIDE 8 MG/1
8 TABLET, FILM COATED ORAL EVERY 8 HOURS PRN
Qty: 30 TABLET | Refills: 3 | Status: SHIPPED | OUTPATIENT
Start: 2020-08-27 | End: 2020-09-15

## 2020-08-27 RX ORDER — LORAZEPAM 0.5 MG/1
0.5 TABLET ORAL EVERY 6 HOURS PRN
Qty: 30 TABLET | Refills: 0 | Status: SHIPPED | OUTPATIENT
Start: 2020-08-27 | End: 2020-10-08

## 2020-09-01 ENCOUNTER — OFFICE VISIT (OUTPATIENT)
Dept: HEMATOLOGY/ONCOLOGY | Facility: HOSPITAL | Age: 55
End: 2020-09-01
Attending: INTERNAL MEDICINE
Payer: COMMERCIAL

## 2020-09-01 VITALS
SYSTOLIC BLOOD PRESSURE: 123 MMHG | HEART RATE: 63 BPM | OXYGEN SATURATION: 98 % | TEMPERATURE: 98 F | BODY MASS INDEX: 23.53 KG/M2 | RESPIRATION RATE: 18 BRPM | HEIGHT: 70.24 IN | DIASTOLIC BLOOD PRESSURE: 82 MMHG | WEIGHT: 164.38 LBS

## 2020-09-01 DIAGNOSIS — F06.4 ANXIETY DISORDER DUE TO MEDICAL CONDITION: ICD-10-CM

## 2020-09-01 DIAGNOSIS — T45.1X5A CHEMOTHERAPY-INDUCED NEUTROPENIA (HCC): ICD-10-CM

## 2020-09-01 DIAGNOSIS — C81.41: Primary | ICD-10-CM

## 2020-09-01 DIAGNOSIS — D70.1 CHEMOTHERAPY-INDUCED NEUTROPENIA (HCC): ICD-10-CM

## 2020-09-01 LAB
BASOPHILS # BLD AUTO: 0.06 X10(3) UL (ref 0–0.2)
BASOPHILS NFR BLD AUTO: 1.8 %
DEPRECATED RDW RBC AUTO: 40.5 FL (ref 35.1–46.3)
EOSINOPHIL # BLD AUTO: 0.06 X10(3) UL (ref 0–0.7)
EOSINOPHIL NFR BLD AUTO: 1.8 %
ERYTHROCYTE [DISTWIDTH] IN BLOOD BY AUTOMATED COUNT: 14.3 % (ref 11–15)
HCT VFR BLD AUTO: 41.9 % (ref 39–53)
HGB BLD-MCNC: 13.3 G/DL (ref 13–17.5)
IMM GRANULOCYTES # BLD AUTO: 0.06 X10(3) UL (ref 0–1)
IMM GRANULOCYTES NFR BLD: 1.8 %
LYMPHOCYTES # BLD AUTO: 1.72 X10(3) UL (ref 1–4)
LYMPHOCYTES NFR BLD AUTO: 51.7 %
MCH RBC QN AUTO: 26.4 PG (ref 26–34)
MCHC RBC AUTO-ENTMCNC: 31.7 G/DL (ref 31–37)
MCV RBC AUTO: 83.1 FL (ref 80–100)
MONOCYTES # BLD AUTO: 0.74 X10(3) UL (ref 0.1–1)
MONOCYTES NFR BLD AUTO: 22.2 %
NEUTROPHILS # BLD AUTO: 0.69 X10 (3) UL (ref 1.5–7.7)
NEUTROPHILS # BLD AUTO: 0.69 X10(3) UL (ref 1.5–7.7)
NEUTROPHILS NFR BLD AUTO: 20.7 %
PLATELET # BLD AUTO: 296 10(3)UL (ref 150–450)
RBC # BLD AUTO: 5.04 X10(6)UL (ref 4.3–5.7)
WBC # BLD AUTO: 3.3 X10(3) UL (ref 4–11)

## 2020-09-01 PROCEDURE — 85025 COMPLETE CBC W/AUTO DIFF WBC: CPT

## 2020-09-01 PROCEDURE — 99214 OFFICE O/P EST MOD 30 MIN: CPT | Performed by: INTERNAL MEDICINE

## 2020-09-01 PROCEDURE — 96375 TX/PRO/DX INJ NEW DRUG ADDON: CPT

## 2020-09-01 PROCEDURE — 96411 CHEMO IV PUSH ADDL DRUG: CPT

## 2020-09-01 PROCEDURE — 96413 CHEMO IV INFUSION 1 HR: CPT

## 2020-09-01 NOTE — PROGRESS NOTES
Cancer Center Progress Note    Problem List:      Patient Active Problem List:     Acute sinusitis, unspecified     Acute bronchitis     Acute pharyngitis     Screening for thyroid disorder     Screening for other and unspecified endocrine, nutritional, me impairment     glasses   • Vitamin D deficiency        Past Surgical History:   Procedure Laterality Date   • APPENDECTOMY  age 18   • CERVICAL LYMPH NODE BX Left 6/25/2020    Performed by Perez Macdonald MD at [facility] OR   • TONSILLECTOMY N/A 5/27/2016 Cholecalciferol (VITAMIN D3) 1.25 MG (58664 UT) Oral Cap, Take by mouth once a week., Disp: , Rfl:   Cholecalciferol 50 MCG (2000 UT) Oral Cap, Start one capsule by mouth daily after completing prescription Vitamin D, Disp: , Rfl:           Vital Signs: 08/25/2020    ALT 58 08/25/2020    AST 23 08/25/2020    BILT 0.3 08/25/2020    ALB 3.7 08/25/2020    TP 7.2 08/25/2020       Radiologic imaging reviewed at this visit:    PET/CT on 7/15/2020:  FINDINGS:    ABNORMAL FOCI:  Abnormal FDG activity involves mul

## 2020-09-01 NOTE — PROGRESS NOTES
Pt here for C2D15 ABVD.   Arrives Ambulating independently, accompanied by Self           Patient reports possible pregnancy since last therapy cycle: No    Modifications in dose or schedule: No     Frequency of blood return and site check throughout admini

## 2020-09-08 ENCOUNTER — NURSE ONLY (OUTPATIENT)
Dept: HEMATOLOGY/ONCOLOGY | Facility: HOSPITAL | Age: 55
End: 2020-09-08
Attending: INTERNAL MEDICINE
Payer: COMMERCIAL

## 2020-09-08 DIAGNOSIS — C81.41: Primary | ICD-10-CM

## 2020-09-08 LAB
BASOPHILS # BLD AUTO: 0.04 X10(3) UL (ref 0–0.2)
BASOPHILS NFR BLD AUTO: 1.1 %
DEPRECATED RDW RBC AUTO: 39.2 FL (ref 35.1–46.3)
EOSINOPHIL # BLD AUTO: 0.05 X10(3) UL (ref 0–0.7)
EOSINOPHIL NFR BLD AUTO: 1.3 %
ERYTHROCYTE [DISTWIDTH] IN BLOOD BY AUTOMATED COUNT: 14.3 % (ref 11–15)
HCT VFR BLD AUTO: 37.9 % (ref 39–53)
HGB BLD-MCNC: 12.9 G/DL (ref 13–17.5)
IMM GRANULOCYTES # BLD AUTO: 0.03 X10(3) UL (ref 0–1)
IMM GRANULOCYTES NFR BLD: 0.8 %
LYMPHOCYTES # BLD AUTO: 1.54 X10(3) UL (ref 1–4)
LYMPHOCYTES NFR BLD AUTO: 40.8 %
MCH RBC QN AUTO: 27 PG (ref 26–34)
MCHC RBC AUTO-ENTMCNC: 34 G/DL (ref 31–37)
MCV RBC AUTO: 79.3 FL (ref 80–100)
MONOCYTES # BLD AUTO: 0.16 X10(3) UL (ref 0.1–1)
MONOCYTES NFR BLD AUTO: 4.2 %
NEUTROPHILS # BLD AUTO: 1.95 X10 (3) UL (ref 1.5–7.7)
NEUTROPHILS # BLD AUTO: 1.95 X10(3) UL (ref 1.5–7.7)
NEUTROPHILS NFR BLD AUTO: 51.8 %
PLATELET # BLD AUTO: 291 10(3)UL (ref 150–450)
RBC # BLD AUTO: 4.78 X10(6)UL (ref 4.3–5.7)
WBC # BLD AUTO: 3.8 X10(3) UL (ref 4–11)

## 2020-09-08 PROCEDURE — 85025 COMPLETE CBC W/AUTO DIFF WBC: CPT

## 2020-09-08 PROCEDURE — 36591 DRAW BLOOD OFF VENOUS DEVICE: CPT

## 2020-09-08 NOTE — PROGRESS NOTES
Pt here for cbc, denies any fever or other symptoms at home. CBC drawn, per Dr. Karlos Feng, patient may leave, will notify . patient of results. Pt d/c to home in stable condition.

## 2020-09-09 ENCOUNTER — HOSPITAL ENCOUNTER (OUTPATIENT)
Dept: NUCLEAR MEDICINE | Facility: HOSPITAL | Age: 55
Discharge: HOME | End: 2020-09-09
Attending: INTERNAL MEDICINE
Payer: COMMERCIAL

## 2020-09-09 DIAGNOSIS — C81.41: ICD-10-CM

## 2020-09-09 LAB — GLUCOSE BLD-MCNC: 114 MG/DL (ref 70–99)

## 2020-09-09 PROCEDURE — 78815 PET IMAGE W/CT SKULL-THIGH: CPT | Performed by: INTERNAL MEDICINE

## 2020-09-09 PROCEDURE — 82962 GLUCOSE BLOOD TEST: CPT

## 2020-09-10 ENCOUNTER — HOSPITAL ENCOUNTER (OUTPATIENT)
Dept: RADIATION ONCOLOGY | Facility: HOSPITAL | Age: 55
Discharge: HOME | End: 2020-09-10
Attending: RADIOLOGY
Payer: COMMERCIAL

## 2020-09-10 VITALS
SYSTOLIC BLOOD PRESSURE: 132 MMHG | RESPIRATION RATE: 16 BRPM | BODY MASS INDEX: 23.01 KG/M2 | OXYGEN SATURATION: 99 % | DIASTOLIC BLOOD PRESSURE: 79 MMHG | HEART RATE: 77 BPM | TEMPERATURE: 98 F | WEIGHT: 164.38 LBS | HEIGHT: 71 IN

## 2020-09-10 DIAGNOSIS — C81.41: Primary | ICD-10-CM

## 2020-09-10 PROCEDURE — 99214 OFFICE O/P EST MOD 30 MIN: CPT

## 2020-09-10 RX ORDER — POTASSIUM CHLORIDE 20 MEQ/1
20 TABLET, EXTENDED RELEASE ORAL DAILY
Qty: 30 TABLET | Refills: 0 | Status: SHIPPED | OUTPATIENT
Start: 2020-09-10 | End: 2020-10-08

## 2020-09-10 NOTE — PATIENT INSTRUCTIONS
- WE WILL CALL AND SCHEDULE YOUR CT SIMULATION/MAPPING       - IF YOU HAVE ANY QUESTIONS OR CONCERNS REGARDING RADIATION THERAPY, PLEASE CALL US AT [phone]

## 2020-09-10 NOTE — PROGRESS NOTES
Nursing Consultation Note  Patient: Da Francis  YOB: 1965  Age: 50year old  Radiation Oncologist: Dr. Johnathan Coronado  Referring Physician: Cleo Mccullough, Dr. Alexander Edwards, Dr. Yelena Gaviria (PCP)  Diagnosis: Tarik Brown  Consult Date: 9/10/20 happened after tonsillectomy. Review of Systems   Constitutional: Negative. HENT: Negative. Eyes: Negative. Respiratory: Negative. Cardiovascular: Negative. Gastrointestinal: Negative. Endocrine: Negative. Genitourinary: Negative. Pain.         Preferred Pharmacy:    [id number] - [address] [facility], . Angelina Hernandez "Denisha" [phone], [address] [place] [address]  Phone: [phone] Fax: [phone]      Past Medical History:   Diagnosis Date   • Canc Relationship status: Not on file      Intimate partner violence:        Fear of current or ex partner: Not on file        Emotionally abused: Not on file        Physically abused: Not on file        Forced sexual activity: Not on file    Other Topics

## 2020-09-10 NOTE — CONSULTS
[facility] RADIATION ONCOLOGY CONSULTATION     PATIENT:   Jennifer Orourke MD:  Gerardine Bloch, MD      DIAGNOSIS:   Lymphocyte rich classical Hodgkin lymphoma, stage IA     CC:    Hodgkin lymphoma    HPI   53-year-old man here with his wife. 82-year-old man with classical Hodgkin lymphoma, lymphocyte-rich type, stage IA, non-bulky, involving the left cervical and supraclavicular nodes (1 region), fitting into a favorable prognostic group ([place])    -Has a negative PET after 2 cycles of ABVD

## 2020-09-11 ENCOUNTER — HOSPITAL ENCOUNTER (OUTPATIENT)
Dept: RADIATION ONCOLOGY | Facility: HOSPITAL | Age: 55
Discharge: HOME | End: 2020-09-11
Attending: RADIOLOGY
Payer: COMMERCIAL

## 2020-09-11 PROCEDURE — 77334 RADIATION TREATMENT AID(S): CPT | Performed by: RADIOLOGY

## 2020-09-11 PROCEDURE — 77399 UNLISTED PX MED RADJ PHYSICS: CPT | Performed by: RADIOLOGY

## 2020-09-11 PROCEDURE — 77290 THER RAD SIMULAJ FIELD CPLX: CPT | Performed by: RADIOLOGY

## 2020-09-14 DIAGNOSIS — C81.41: Primary | ICD-10-CM

## 2020-09-14 NOTE — PROGRESS NOTES
Cancer Center Progress Note    Problem List:      Patient Active Problem List:     Acute sinusitis, unspecified     Acute bronchitis     Acute pharyngitis     Screening for thyroid disorder     Screening for other and unspecified endocrine, nutritional, me negative.     PMH/PSH:  Past Medical History:   Diagnosis Date   • Cancer [facility])     lymphoma   • Hyperlipidemia    • Visual impairment     glasses   • Vitamin D deficiency        Past Surgical History:   Procedure Laterality Date   • APPENDECTOMY  age 18 focal motor or sensory deficit. Skin: No suspicious skin lesion, no rash, no ulceration. Lymphatics: There is no palpable lymphadenopathy throughout in the cervical, supraclavicular, or axillary regions.   Psychiatric: The patient's mood is calm and appro about this plan of management. I discussed ABVD treatment. I discussed the risk of hair loss, low blood counts, infection, fever, sepsis, cardiac toxicity, pulmonary toxicity, neuropathy, constipation and fatigue. He has a good understanding.  We will tr

## 2020-09-15 ENCOUNTER — OFFICE VISIT (OUTPATIENT)
Dept: HEMATOLOGY/ONCOLOGY | Facility: HOSPITAL | Age: 55
End: 2020-09-15
Attending: INTERNAL MEDICINE
Payer: COMMERCIAL

## 2020-09-15 VITALS
HEART RATE: 65 BPM | HEIGHT: 70.24 IN | RESPIRATION RATE: 18 BRPM | DIASTOLIC BLOOD PRESSURE: 74 MMHG | BODY MASS INDEX: 23.77 KG/M2 | TEMPERATURE: 98 F | WEIGHT: 166 LBS | SYSTOLIC BLOOD PRESSURE: 131 MMHG

## 2020-09-15 DIAGNOSIS — D63.0 ANEMIA COMPLICATING NEOPLASTIC DISEASE: ICD-10-CM

## 2020-09-15 DIAGNOSIS — C81.41: Primary | ICD-10-CM

## 2020-09-15 DIAGNOSIS — T45.1X5A CHEMOTHERAPY-INDUCED NEUTROPENIA (HCC): ICD-10-CM

## 2020-09-15 DIAGNOSIS — D70.1 CHEMOTHERAPY-INDUCED NEUTROPENIA (HCC): ICD-10-CM

## 2020-09-15 LAB
ALBUMIN SERPL-MCNC: 3.8 G/DL (ref 3.4–5)
ALBUMIN/GLOB SERPL: 1.1 {RATIO} (ref 1–2)
ALP LIVER SERPL-CCNC: 50 U/L (ref 45–117)
ALT SERPL-CCNC: 44 U/L (ref 16–61)
ANION GAP SERPL CALC-SCNC: 5 MMOL/L (ref 0–18)
AST SERPL-CCNC: 20 U/L (ref 15–37)
BASOPHILS # BLD AUTO: 0.05 X10(3) UL (ref 0–0.2)
BASOPHILS NFR BLD AUTO: 1.6 %
BILIRUB SERPL-MCNC: 0.3 MG/DL (ref 0.1–2)
BUN BLD-MCNC: 12 MG/DL (ref 7–18)
BUN/CREAT SERPL: 10.2 (ref 10–20)
CALCIUM BLD-MCNC: 9.3 MG/DL (ref 8.5–10.1)
CHLORIDE SERPL-SCNC: 106 MMOL/L (ref 98–112)
CO2 SERPL-SCNC: 28 MMOL/L (ref 21–32)
CREAT BLD-MCNC: 1.18 MG/DL (ref 0.7–1.3)
DEPRECATED RDW RBC AUTO: 43.8 FL (ref 35.1–46.3)
EOSINOPHIL # BLD AUTO: 0.09 X10(3) UL (ref 0–0.7)
EOSINOPHIL NFR BLD AUTO: 2.8 %
ERYTHROCYTE [DISTWIDTH] IN BLOOD BY AUTOMATED COUNT: 15.7 % (ref 11–15)
GLOBULIN PLAS-MCNC: 3.5 G/DL (ref 2.8–4.4)
GLUCOSE BLD-MCNC: 109 MG/DL (ref 70–99)
HCT VFR BLD AUTO: 39.5 % (ref 39–53)
HGB BLD-MCNC: 13.1 G/DL (ref 13–17.5)
IMM GRANULOCYTES # BLD AUTO: 0.04 X10(3) UL (ref 0–1)
IMM GRANULOCYTES NFR BLD: 1.2 %
LYMPHOCYTES # BLD AUTO: 1.45 X10(3) UL (ref 1–4)
LYMPHOCYTES NFR BLD AUTO: 45.2 %
M PROTEIN MFR SERPL ELPH: 7.3 G/DL (ref 6.4–8.2)
MCH RBC QN AUTO: 26.8 PG (ref 26–34)
MCHC RBC AUTO-ENTMCNC: 33.2 G/DL (ref 31–37)
MCV RBC AUTO: 80.8 FL (ref 80–100)
MONOCYTES # BLD AUTO: 0.8 X10(3) UL (ref 0.1–1)
MONOCYTES NFR BLD AUTO: 24.9 %
NEUTROPHILS # BLD AUTO: 0.78 X10 (3) UL (ref 1.5–7.7)
NEUTROPHILS # BLD AUTO: 0.78 X10(3) UL (ref 1.5–7.7)
NEUTROPHILS NFR BLD AUTO: 24.3 %
OSMOLALITY SERPL CALC.SUM OF ELEC: 288 MOSM/KG (ref 275–295)
PATIENT FASTING Y/N/NP: NO
PLATELET # BLD AUTO: 277 10(3)UL (ref 150–450)
POTASSIUM SERPL-SCNC: 3.9 MMOL/L (ref 3.5–5.1)
RBC # BLD AUTO: 4.89 X10(6)UL (ref 4.3–5.7)
SODIUM SERPL-SCNC: 139 MMOL/L (ref 136–145)
WBC # BLD AUTO: 3.2 X10(3) UL (ref 4–11)

## 2020-09-15 PROCEDURE — 36591 DRAW BLOOD OFF VENOUS DEVICE: CPT

## 2020-09-15 PROCEDURE — 99214 OFFICE O/P EST MOD 30 MIN: CPT | Performed by: INTERNAL MEDICINE

## 2020-09-15 PROCEDURE — 80053 COMPREHEN METABOLIC PANEL: CPT

## 2020-09-15 PROCEDURE — 85025 COMPLETE CBC W/AUTO DIFF WBC: CPT

## 2020-09-15 RX ORDER — SODIUM CHLORIDE 0.9 % (FLUSH) 0.9 %
10 SYRINGE (ML) INJECTION ONCE
Status: COMPLETED | OUTPATIENT
Start: 2020-09-15 | End: 2020-09-15

## 2020-09-15 RX ORDER — SODIUM CHLORIDE 9 MG/ML
INJECTION, SOLUTION INTRAVENOUS ONCE
Status: CANCELLED
Start: 2020-09-15

## 2020-09-15 RX ORDER — SODIUM CHLORIDE 0.9 % (FLUSH) 0.9 %
10 SYRINGE (ML) INJECTION ONCE
Status: CANCELLED | OUTPATIENT
Start: 2020-09-15

## 2020-09-15 RX ADMIN — SODIUM CHLORIDE 0.9 % (FLUSH) 10 ML: 0.9 % SYRINGE (ML) INJECTION at 09:45:00

## 2020-09-15 NOTE — PROGRESS NOTES
Education Record    Learner:  Patient    Disease / Diagnosis: Hodgkin lymphoma    Barriers / Limitations:  None   Comments:    Method:  Brief focused   Comments:    General Topics:  Plan of care reviewed   Comments:    Outcome:  Shows understanding   Hayes Pantoja

## 2020-09-15 NOTE — PROGRESS NOTES
Cancer Center Progress Note    Problem List:      Patient Active Problem List:     Acute sinusitis, unspecified     Acute bronchitis     Acute pharyngitis     Screening for thyroid disorder     Screening for other and unspecified endocrine, nutritional, me 6/25/2020    Performed by Fede Olivia MD at [facility] MAIN OR   • OTHER Right     portacath placement   • TONSILLECTOMY N/A 5/27/2016    Performed by Fede Olivia MD at [address] [place] [address]       Family History Reviewed:  Family History   Problem Relation Ag Signs:      Height: 178.4 cm (5' 10.24\") (09/15 0854)  Weight: 75.3 kg (166 lb) (09/15 0854)  BSA (Calculated - sq m): 1.93 sq meters (09/15 0854)  Pulse: 65 (09/15 0854)  BP: 131/74 (09/15 0854)  Temp: 97.7 °F (36.5 °C) (09/15 0854)  Do Not Use - Resp Ra previously demonstrated abnormal activity in the left neck related to previously demonstrated FDG avid lymph nodes. The lymph nodes all have decreased in size as well, and all are now subcentimeter.  No new or enlarging mass, or new area of abnormal FDG act

## 2020-09-17 PROCEDURE — 77295 3-D RADIOTHERAPY PLAN: CPT | Performed by: RADIOLOGY

## 2020-09-17 PROCEDURE — 77334 RADIATION TREATMENT AID(S): CPT | Performed by: RADIOLOGY

## 2020-09-17 PROCEDURE — 77300 RADIATION THERAPY DOSE PLAN: CPT | Performed by: RADIOLOGY

## 2020-09-19 ENCOUNTER — APPOINTMENT (OUTPATIENT)
Dept: LAB | Facility: HOSPITAL | Age: 55
End: 2020-09-19
Attending: RADIOLOGY
Payer: COMMERCIAL

## 2020-09-19 DIAGNOSIS — C81.41: ICD-10-CM

## 2020-09-20 LAB — SARS-COV-2 RNA RESP QL NAA+PROBE: NOT DETECTED

## 2020-09-28 ENCOUNTER — HOSPITAL ENCOUNTER (OUTPATIENT)
Dept: RADIATION ONCOLOGY | Facility: HOSPITAL | Age: 55
Discharge: HOME | End: 2020-09-28
Attending: RADIOLOGY
Payer: COMMERCIAL

## 2020-09-28 DIAGNOSIS — C81.41: Primary | ICD-10-CM

## 2020-09-28 PROCEDURE — 77412 RADIATION TX DELIVERY LVL 3: CPT | Performed by: RADIOLOGY

## 2020-09-28 PROCEDURE — 77280 THER RAD SIMULAJ FIELD SMPL: CPT | Performed by: RADIOLOGY

## 2020-09-28 NOTE — PROGRESS NOTES
[facility] Radiation Treatment Management Note 1-5    Patient:  Sharonda Aldana  Age:  50year old  Visit Diagnosis:    1.  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 Gibran Alexander

## 2020-09-29 PROCEDURE — 77412 RADIATION TX DELIVERY LVL 3: CPT | Performed by: RADIOLOGY

## 2020-09-29 PROCEDURE — 77387 GUIDANCE FOR RADJ TX DLVR: CPT | Performed by: RADIOLOGY

## 2020-09-30 PROCEDURE — 77331 SPECIAL RADIATION DOSIMETRY: CPT | Performed by: RADIOLOGY

## 2020-09-30 PROCEDURE — 77412 RADIATION TX DELIVERY LVL 3: CPT | Performed by: RADIOLOGY

## 2020-09-30 PROCEDURE — 77387 GUIDANCE FOR RADJ TX DLVR: CPT | Performed by: RADIOLOGY

## 2020-10-01 ENCOUNTER — HOSPITAL ENCOUNTER (OUTPATIENT)
Dept: RADIATION ONCOLOGY | Facility: HOSPITAL | Age: 55
Discharge: HOME | End: 2020-10-01
Attending: RADIOLOGY
Payer: COMMERCIAL

## 2020-10-01 PROCEDURE — 77412 RADIATION TX DELIVERY LVL 3: CPT | Performed by: RADIOLOGY

## 2020-10-01 PROCEDURE — 77387 GUIDANCE FOR RADJ TX DLVR: CPT | Performed by: RADIOLOGY

## 2020-10-02 PROCEDURE — 77336 RADIATION PHYSICS CONSULT: CPT | Performed by: RADIOLOGY

## 2020-10-02 PROCEDURE — 77412 RADIATION TX DELIVERY LVL 3: CPT | Performed by: RADIOLOGY

## 2020-10-02 PROCEDURE — 77387 GUIDANCE FOR RADJ TX DLVR: CPT | Performed by: RADIOLOGY

## 2020-10-05 ENCOUNTER — HOSPITAL ENCOUNTER (OUTPATIENT)
Dept: RADIATION ONCOLOGY | Facility: HOSPITAL | Age: 55
Discharge: HOME | End: 2020-10-05
Attending: RADIOLOGY
Payer: COMMERCIAL

## 2020-10-05 VITALS
RESPIRATION RATE: 16 BRPM | SYSTOLIC BLOOD PRESSURE: 123 MMHG | DIASTOLIC BLOOD PRESSURE: 71 MMHG | OXYGEN SATURATION: 99 % | WEIGHT: 162.81 LBS | HEART RATE: 72 BPM | BODY MASS INDEX: 23 KG/M2 | TEMPERATURE: 98 F

## 2020-10-05 DIAGNOSIS — C81.41: Primary | ICD-10-CM

## 2020-10-05 PROCEDURE — 77412 RADIATION TX DELIVERY LVL 3: CPT | Performed by: RADIOLOGY

## 2020-10-05 PROCEDURE — 77387 GUIDANCE FOR RADJ TX DLVR: CPT | Performed by: RADIOLOGY

## 2020-10-05 NOTE — PROGRESS NOTES
[facility] Radiation Treatment Management Note 6-10    Patient:  Keturah Dejesus  Age:  50year old  Visit Diagnosis:    1.  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 Ean Camargo    Site Deliver

## 2020-10-05 NOTE — PATIENT INSTRUCTIONS
POST-RADIATION INSTRUCTIONS:   - CALL [phone] FOR A FOLLOW-UP WITH DR. RODRIGUEZ 6 MONTHS AFTER RADIATION COMPLETION (April 2021)  - SIDE EFFECTS OF RADIATION WILL GRADUALLY SUBSIDE.  IT MAY TAKE 2-3 WEEKS POST-RADIATION FOR YOU TO NOTICE CHANGES; SUCH AS

## 2020-10-06 PROCEDURE — 77412 RADIATION TX DELIVERY LVL 3: CPT | Performed by: RADIOLOGY

## 2020-10-06 PROCEDURE — 77387 GUIDANCE FOR RADJ TX DLVR: CPT | Performed by: RADIOLOGY

## 2020-10-07 PROCEDURE — 77387 GUIDANCE FOR RADJ TX DLVR: CPT | Performed by: RADIOLOGY

## 2020-10-07 PROCEDURE — 77412 RADIATION TX DELIVERY LVL 3: CPT | Performed by: RADIOLOGY

## 2020-10-08 ENCOUNTER — NURSE ONLY (OUTPATIENT)
Dept: RADIATION ONCOLOGY | Facility: HOSPITAL | Age: 55
End: 2020-10-08

## 2020-10-08 PROCEDURE — 77387 GUIDANCE FOR RADJ TX DLVR: CPT | Performed by: RADIOLOGY

## 2020-10-08 PROCEDURE — 77412 RADIATION TX DELIVERY LVL 3: CPT | Performed by: RADIOLOGY

## 2020-10-08 RX ORDER — POTASSIUM CHLORIDE 20 MEQ/1
20 TABLET, EXTENDED RELEASE ORAL DAILY
Qty: 30 TABLET | Refills: 5 | Status: SHIPPED | OUTPATIENT
Start: 2020-10-08 | End: 2020-10-08

## 2020-10-08 NOTE — PATIENT INSTRUCTIONS
-For radiation esophagitis/throat pain:     -Per Dr Suhail Salazar you may use the \"magic mouthwash\", take 15 ml to GARGLE & SWALLOW  (DO NOT swish in your mouth) about 10 min prior to meals & at bedtime if needed.    This will provide a short term topical relief

## 2020-10-08 NOTE — PROGRESS NOTES
Saw patient while here for daily RT, has c/o of \"throat pain\". Instructed esophagitis is expected side effect of RT. Reviewed use of Ibuprofen & magic mouthwash for pain control. Reviewed he can take softer foods, cool fluids as well.  Patient states he w

## 2020-10-09 ENCOUNTER — DOCUMENTATION ONLY (OUTPATIENT)
Dept: RADIATION ONCOLOGY | Facility: HOSPITAL | Age: 55
End: 2020-10-09

## 2020-10-09 PROCEDURE — 77387 GUIDANCE FOR RADJ TX DLVR: CPT | Performed by: RADIOLOGY

## 2020-10-09 PROCEDURE — 77412 RADIATION TX DELIVERY LVL 3: CPT | Performed by: RADIOLOGY

## 2020-10-09 PROCEDURE — 77336 RADIATION PHYSICS CONSULT: CPT | Performed by: RADIOLOGY

## 2020-10-14 NOTE — PROGRESS NOTES
[facility] RAD[facility]ATION [facility]COLOG[facility]  TREATMENT SUMMARY     PATIENT:  Rachel Gitelman MD: Pia Whyte MD  DIAGNOSIS:  Lymphocyte rich classical Hodgkin lymphoma, stage Ia    HISTORY   68-year-old man with classical Hodgkin lymphoma, lymphocyte-rich

## 2020-10-17 ENCOUNTER — APPOINTMENT (OUTPATIENT)
Dept: LAB | Facility: HOSPITAL | Age: 55
End: 2020-10-17
Attending: INTERNAL MEDICINE
Payer: COMMERCIAL

## 2020-10-17 DIAGNOSIS — C81.90 HODGKIN LYMPHOMA (HCC): ICD-10-CM

## 2020-10-20 ENCOUNTER — HOSPITAL ENCOUNTER (OUTPATIENT)
Dept: INTERVENTIONAL RADIOLOGY/VASCULAR | Facility: HOSPITAL | Age: 55
Discharge: HOME | End: 2020-10-20
Attending: INTERNAL MEDICINE | Admitting: INTERNAL MEDICINE
Payer: COMMERCIAL

## 2020-10-20 VITALS
SYSTOLIC BLOOD PRESSURE: 131 MMHG | HEIGHT: 71 IN | HEART RATE: 68 BPM | OXYGEN SATURATION: 99 % | DIASTOLIC BLOOD PRESSURE: 83 MMHG | RESPIRATION RATE: 14 BRPM | WEIGHT: 164 LBS | BODY MASS INDEX: 22.96 KG/M2 | TEMPERATURE: 97 F

## 2020-10-20 DIAGNOSIS — C81.41: ICD-10-CM

## 2020-10-20 DIAGNOSIS — C81.90 HODGKIN LYMPHOMA (HCC): Primary | ICD-10-CM

## 2020-10-20 PROCEDURE — 0JPT3WZ REMOVAL OF TOTALLY IMPLANTABLE VASCULAR ACCESS DEVICE FROM TRUNK SUBCUTANEOUS TISSUE AND FASCIA, PERCUTANEOUS APPROACH: ICD-10-PCS | Performed by: RADIOLOGY

## 2020-10-20 PROCEDURE — 77001 FLUOROGUIDE FOR VEIN DEVICE: CPT

## 2020-10-20 PROCEDURE — 99152 MOD SED SAME PHYS/QHP 5/>YRS: CPT

## 2020-10-20 PROCEDURE — 85610 PROTHROMBIN TIME: CPT

## 2020-10-20 PROCEDURE — 36590 REMOVAL TUNNELED CV CATH: CPT

## 2020-10-20 RX ORDER — DIPHENHYDRAMINE HYDROCHLORIDE 50 MG/ML
50 INJECTION INTRAMUSCULAR; INTRAVENOUS ONCE AS NEEDED
Status: DISCONTINUED | OUTPATIENT
Start: 2020-10-20 | End: 2020-10-20

## 2020-10-20 RX ORDER — SODIUM CHLORIDE 9 MG/ML
INJECTION, SOLUTION INTRAVENOUS CONTINUOUS
Status: DISCONTINUED | OUTPATIENT
Start: 2020-10-20 | End: 2020-10-20

## 2020-10-20 RX ORDER — MIDAZOLAM HYDROCHLORIDE 1 MG/ML
INJECTION INTRAMUSCULAR; INTRAVENOUS
Status: COMPLETED
Start: 2020-10-20 | End: 2020-10-20

## 2020-10-20 RX ORDER — BACITRACIN 50000 [USP'U]/1
INJECTION, POWDER, LYOPHILIZED, FOR SOLUTION INTRAMUSCULAR
Status: COMPLETED
Start: 2020-10-20 | End: 2020-10-20

## 2020-10-20 RX ORDER — ACETAMINOPHEN 325 MG/1
650 TABLET ORAL EVERY 6 HOURS PRN
Status: DISCONTINUED | OUTPATIENT
Start: 2020-10-20 | End: 2020-10-20

## 2020-10-20 RX ORDER — ACETAMINOPHEN 325 MG/1
TABLET ORAL
Status: COMPLETED
Start: 2020-10-20 | End: 2020-10-20

## 2020-10-20 RX ORDER — LIDOCAINE HYDROCHLORIDE 10 MG/ML
INJECTION, SOLUTION INFILTRATION; PERINEURAL
Status: COMPLETED
Start: 2020-10-20 | End: 2020-10-20

## 2020-10-20 RX ORDER — LIDOCAINE HYDROCHLORIDE AND EPINEPHRINE 10; 10 MG/ML; UG/ML
INJECTION, SOLUTION INFILTRATION; PERINEURAL
Status: COMPLETED
Start: 2020-10-20 | End: 2020-10-20

## 2020-10-20 RX ADMIN — ACETAMINOPHEN 650 MG: 325 TABLET ORAL at 10:05:00

## 2020-10-20 NOTE — PROGRESS NOTES
S/p right chest port removal. Steri strips/tegaderm with old draininage noted. C/o pain to right chest area, meds given view emar. VSS. Discharge instructions given/explained to patient/spouse, all questions answered, verbalized understanding.  Tele dc'd,

## 2020-10-21 ENCOUNTER — TELEPHONE (OUTPATIENT)
Dept: HEMATOLOGY/ONCOLOGY | Facility: HOSPITAL | Age: 55
End: 2020-10-21

## 2020-10-21 NOTE — TELEPHONE ENCOUNTER
Toxicities: C2 D15 Doxorubicin/Bleomycin Sulfate/Vinblastine Sulfate/Dacarbazine on 9/1/2020    Itchy Bumps: (For the last 2 weeks the patient has had itchy bumps on hands, groin, both legs from the knees up to the groin.  The skin is not red until he scrat

## 2020-10-30 ENCOUNTER — SOCIAL WORK SERVICES (OUTPATIENT)
Dept: HEMATOLOGY/ONCOLOGY | Facility: HOSPITAL | Age: 55
End: 2020-10-30

## 2020-10-30 NOTE — PROGRESS NOTES
Sw completed form from [address] [place] [address] and faxed that and requested medical documentation from 7/2020 to present to them.

## 2020-11-06 ENCOUNTER — OFFICE VISIT (OUTPATIENT)
Dept: FAMILY MEDICINE CLINIC | Facility: CLINIC | Age: 55
End: 2020-11-06
Payer: COMMERCIAL

## 2020-11-06 ENCOUNTER — LAB ENCOUNTER (OUTPATIENT)
Dept: LAB | Age: 55
End: 2020-11-06
Attending: FAMILY MEDICINE
Payer: COMMERCIAL

## 2020-11-06 VITALS
RESPIRATION RATE: 16 BRPM | SYSTOLIC BLOOD PRESSURE: 110 MMHG | HEART RATE: 64 BPM | TEMPERATURE: 98 F | BODY MASS INDEX: 23.8 KG/M2 | HEIGHT: 71 IN | DIASTOLIC BLOOD PRESSURE: 60 MMHG | WEIGHT: 170 LBS

## 2020-11-06 DIAGNOSIS — E55.9 VITAMIN D DEFICIENCY: ICD-10-CM

## 2020-11-06 DIAGNOSIS — R73.03 PREDIABETES: ICD-10-CM

## 2020-11-06 DIAGNOSIS — E78.6 LOW HDL (UNDER 40): ICD-10-CM

## 2020-11-06 DIAGNOSIS — Z71.85 VACCINE COUNSELING: ICD-10-CM

## 2020-11-06 DIAGNOSIS — R31.29 MICROSCOPIC HEMATURIA: ICD-10-CM

## 2020-11-06 DIAGNOSIS — C81.41: ICD-10-CM

## 2020-11-06 DIAGNOSIS — Z86.39 HISTORY OF IRON DEFICIENCY: ICD-10-CM

## 2020-11-06 DIAGNOSIS — R73.03 PREDIABETES: Primary | ICD-10-CM

## 2020-11-06 DIAGNOSIS — D56.3 ALPHA THALASSEMIA SILENT CARRIER: ICD-10-CM

## 2020-11-06 DIAGNOSIS — E78.1 HYPERTRIGLYCERIDEMIA: ICD-10-CM

## 2020-11-06 DIAGNOSIS — Z79.899 MEDICATION MANAGEMENT: ICD-10-CM

## 2020-11-06 PROCEDURE — 83036 HEMOGLOBIN GLYCOSYLATED A1C: CPT | Performed by: FAMILY MEDICINE

## 2020-11-06 PROCEDURE — 90732 PPSV23 VACC 2 YRS+ SUBQ/IM: CPT | Performed by: FAMILY MEDICINE

## 2020-11-06 PROCEDURE — 82306 VITAMIN D 25 HYDROXY: CPT | Performed by: FAMILY MEDICINE

## 2020-11-06 PROCEDURE — 82043 UR ALBUMIN QUANTITATIVE: CPT | Performed by: FAMILY MEDICINE

## 2020-11-06 PROCEDURE — 3074F SYST BP LT 130 MM HG: CPT | Performed by: FAMILY MEDICINE

## 2020-11-06 PROCEDURE — 80053 COMPREHEN METABOLIC PANEL: CPT | Performed by: FAMILY MEDICINE

## 2020-11-06 PROCEDURE — 3008F BODY MASS INDEX DOCD: CPT | Performed by: FAMILY MEDICINE

## 2020-11-06 PROCEDURE — 99214 OFFICE O/P EST MOD 30 MIN: CPT | Performed by: FAMILY MEDICINE

## 2020-11-06 PROCEDURE — 80061 LIPID PANEL: CPT | Performed by: FAMILY MEDICINE

## 2020-11-06 PROCEDURE — 90471 IMMUNIZATION ADMIN: CPT | Performed by: FAMILY MEDICINE

## 2020-11-06 PROCEDURE — 82570 ASSAY OF URINE CREATININE: CPT | Performed by: FAMILY MEDICINE

## 2020-11-06 PROCEDURE — 36415 COLL VENOUS BLD VENIPUNCTURE: CPT | Performed by: FAMILY MEDICINE

## 2020-11-06 PROCEDURE — 99072 ADDL SUPL MATRL&STAF TM PHE: CPT | Performed by: FAMILY MEDICINE

## 2020-11-06 PROCEDURE — 3078F DIAST BP <80 MM HG: CPT | Performed by: FAMILY MEDICINE

## 2020-11-06 NOTE — PROGRESS NOTES
Marilu Liz is a 50year old male. HPI:   Patient is here for medication visit. Hodgkin's lymphoma–per Dr. Edmond Ochoa and recently finished chemo and radiation  He notes that he has a course throat and he coughs at times due to it.   He feels it may be due to the dysuria  MUSCULOSKELETAL: denies back pain  NEURO: denies headaches  PSYCHE: denies depression or anxiety  HEMATOLOGIC: denies hx of anemia  ENDOCRINE: denies thyroid history  ALL/ASTHMA: denies hx of allergy or asthma    EXAM:   /60 (BP Location: [place] The patient indicates understanding of these issues and agrees to the plan. Return in about 6 months (around 5/6/2021) for med check. Samantha Morales

## 2020-11-10 ENCOUNTER — PATIENT MESSAGE (OUTPATIENT)
Dept: FAMILY MEDICINE CLINIC | Facility: CLINIC | Age: 55
End: 2020-11-10

## 2020-11-12 ENCOUNTER — TELEPHONE (OUTPATIENT)
Dept: HEMATOLOGY/ONCOLOGY | Facility: HOSPITAL | Age: 55
End: 2020-11-12

## 2020-11-12 ENCOUNTER — TELEMEDICINE (OUTPATIENT)
Dept: HEMATOLOGY/ONCOLOGY | Facility: HOSPITAL | Age: 55
End: 2020-11-12
Attending: INTERNAL MEDICINE
Payer: COMMERCIAL

## 2020-11-12 DIAGNOSIS — C81.41: Primary | ICD-10-CM

## 2020-11-12 DIAGNOSIS — M79.601 PAIN IN BOTH UPPER EXTREMITIES: ICD-10-CM

## 2020-11-12 DIAGNOSIS — M79.602 PAIN IN BOTH UPPER EXTREMITIES: ICD-10-CM

## 2020-11-12 DIAGNOSIS — T45.1X5A CHEMOTHERAPY-INDUCED NEUTROPENIA (HCC): ICD-10-CM

## 2020-11-12 DIAGNOSIS — D70.1 CHEMOTHERAPY-INDUCED NEUTROPENIA (HCC): ICD-10-CM

## 2020-11-12 DIAGNOSIS — D63.0 ANEMIA COMPLICATING NEOPLASTIC DISEASE: ICD-10-CM

## 2020-11-12 DIAGNOSIS — R53.1 GENERALIZED WEAKNESS: ICD-10-CM

## 2020-11-12 PROCEDURE — 99213 OFFICE O/P EST LOW 20 MIN: CPT | Performed by: INTERNAL MEDICINE

## 2020-11-12 NOTE — PROGRESS NOTES
Cancer Center Video Telehealth Progress Note    Problem List:      Patient Active Problem List:     Acute sinusitis, unspecified     Acute bronchitis     Acute pharyngitis     Screening for thyroid disorder     Screening for other and unspecified endocrine negative.     PMH/PSH:  Past Medical History:   Diagnosis Date   • Cancer [facility])     lymphoma   • Hyperlipidemia    • Visual impairment     glasses   • Vitamin D deficiency        Past Surgical History:   Procedure Laterality Date   • APPENDECTOMY  age 18 09/15/2020    HGB 13.1 09/15/2020    HCT 39.5 09/15/2020    MCV 80.8 09/15/2020    MCH 26.8 09/15/2020    MCHC 33.2 09/15/2020    RDW 15.7 (H) 09/15/2020    .0 09/15/2020    MPV 9.5 07/06/2012     Lab Results   Component Value Date     11/06/2 weakness. He has bilateral upper extremity pain that might be from deconditioning. He will return in one month. He will continue with exercise. He will return to work after 12/18/2020.  He will see me for a physical exam on 12/14/2020 with labs in order to

## 2020-11-12 NOTE — TELEPHONE ENCOUNTER
I contacted the patient to let him know Dr Minerva Moise is unavailable for appt today in the office, but can arrange a video visit for today.    Pt agreeable, and will move appt to video visit today at 1pm.

## 2020-11-23 ENCOUNTER — SOCIAL WORK SERVICES (OUTPATIENT)
Dept: HEMATOLOGY/ONCOLOGY | Facility: HOSPITAL | Age: 55
End: 2020-11-23

## 2020-11-23 NOTE — PROGRESS NOTES
SW completed attending physician statement and faxed with completed documentation from 11/2/2020-present to [facility].

## 2020-12-14 ENCOUNTER — OFFICE VISIT (OUTPATIENT)
Dept: HEMATOLOGY/ONCOLOGY | Facility: HOSPITAL | Age: 55
End: 2020-12-14
Attending: INTERNAL MEDICINE
Payer: COMMERCIAL

## 2020-12-14 VITALS
OXYGEN SATURATION: 99 % | TEMPERATURE: 99 F | BODY MASS INDEX: 24.31 KG/M2 | HEART RATE: 80 BPM | SYSTOLIC BLOOD PRESSURE: 143 MMHG | DIASTOLIC BLOOD PRESSURE: 78 MMHG | HEIGHT: 70.98 IN | WEIGHT: 173.63 LBS | RESPIRATION RATE: 16 BRPM

## 2020-12-14 DIAGNOSIS — C81.41: ICD-10-CM

## 2020-12-14 PROCEDURE — 99213 OFFICE O/P EST LOW 20 MIN: CPT | Performed by: INTERNAL MEDICINE

## 2020-12-14 NOTE — PROGRESS NOTES
Cancer Center Progress Note    Problem List:      Patient Active Problem List:     Acute sinusitis, unspecified     Acute bronchitis     Acute pharyngitis     Screening for thyroid disorder     Screening for other and unspecified endocrine, nutritional, me Performed by Onel Luther MD at [facility] MAIN OR   • OTHER Right     portacath placement   • TONSILLECTOMY N/A 5/27/2016    Performed by Onel Luther MD at [address] [place] [address]       Family History Reviewed:  Family History   Problem Relation Age of Onset lesion, no rash, no ulceration. Lymphatics: There is no palpable lymphadenopathy throughout in the cervical, supraclavicular, or axillary regions. Psychiatric: The patient's mood is calm and appropriate for this visit.       Labs reviewed at this visit: injection, possibly related to fibrin and/or chronic thrombus. Advise correlation with catheter functioning. Assessment/Plan:     Classical Hodgkin's Lymphoma, Lymphocyte-rich type:     He now has completed two cycles of ABVD.  He has complete respon

## 2021-03-08 ENCOUNTER — HOSPITAL ENCOUNTER (OUTPATIENT)
Dept: CT IMAGING | Facility: HOSPITAL | Age: 56
Discharge: HOME | End: 2021-03-08
Attending: INTERNAL MEDICINE
Payer: COMMERCIAL

## 2021-03-08 DIAGNOSIS — C81.41: ICD-10-CM

## 2021-03-08 LAB — CREAT BLD-MCNC: 1.1 MG/DL

## 2021-03-08 PROCEDURE — 71260 CT THORAX DX C+: CPT | Performed by: INTERNAL MEDICINE

## 2021-03-08 PROCEDURE — 70491 CT SOFT TISSUE NECK W/DYE: CPT | Performed by: INTERNAL MEDICINE

## 2021-03-08 PROCEDURE — 74177 CT ABD & PELVIS W/CONTRAST: CPT | Performed by: INTERNAL MEDICINE

## 2021-03-08 PROCEDURE — 82565 ASSAY OF CREATININE: CPT

## 2021-03-09 DIAGNOSIS — N28.89 LEFT KIDNEY MASS: Primary | ICD-10-CM

## 2021-03-15 ENCOUNTER — OFFICE VISIT (OUTPATIENT)
Dept: HEMATOLOGY/ONCOLOGY | Facility: HOSPITAL | Age: 56
End: 2021-03-15
Attending: INTERNAL MEDICINE
Payer: COMMERCIAL

## 2021-03-15 VITALS
HEIGHT: 70.98 IN | DIASTOLIC BLOOD PRESSURE: 79 MMHG | HEART RATE: 79 BPM | RESPIRATION RATE: 18 BRPM | BODY MASS INDEX: 24.64 KG/M2 | OXYGEN SATURATION: 97 % | SYSTOLIC BLOOD PRESSURE: 150 MMHG | TEMPERATURE: 97 F | WEIGHT: 176 LBS

## 2021-03-15 DIAGNOSIS — C81.41: Primary | ICD-10-CM

## 2021-03-15 DIAGNOSIS — N28.89 LEFT KIDNEY MASS: ICD-10-CM

## 2021-03-15 LAB
ALBUMIN SERPL-MCNC: 4.1 G/DL (ref 3.4–5)
ALBUMIN/GLOB SERPL: 1.1 {RATIO} (ref 1–2)
ALP LIVER SERPL-CCNC: 63 U/L
ALT SERPL-CCNC: 44 U/L
ANION GAP SERPL CALC-SCNC: 4 MMOL/L (ref 0–18)
AST SERPL-CCNC: 18 U/L (ref 15–37)
BASOPHILS # BLD AUTO: 0.05 X10(3) UL (ref 0–0.2)
BASOPHILS NFR BLD AUTO: 0.8 %
BILIRUB SERPL-MCNC: 0.4 MG/DL (ref 0.1–2)
BUN BLD-MCNC: 10 MG/DL (ref 7–18)
BUN/CREAT SERPL: 9.3 (ref 10–20)
CALCIUM BLD-MCNC: 9.3 MG/DL (ref 8.5–10.1)
CHLORIDE SERPL-SCNC: 105 MMOL/L (ref 98–112)
CO2 SERPL-SCNC: 29 MMOL/L (ref 21–32)
CREAT BLD-MCNC: 1.08 MG/DL
DEPRECATED RDW RBC AUTO: 38.2 FL (ref 35.1–46.3)
EOSINOPHIL # BLD AUTO: 0.11 X10(3) UL (ref 0–0.7)
EOSINOPHIL NFR BLD AUTO: 1.8 %
ERYTHROCYTE [DISTWIDTH] IN BLOOD BY AUTOMATED COUNT: 13.2 % (ref 11–15)
GLOBULIN PLAS-MCNC: 3.7 G/DL (ref 2.8–4.4)
GLUCOSE BLD-MCNC: 120 MG/DL (ref 70–99)
HCT VFR BLD AUTO: 44.9 %
HGB BLD-MCNC: 14.8 G/DL
IMM GRANULOCYTES # BLD AUTO: 0.05 X10(3) UL (ref 0–1)
IMM GRANULOCYTES NFR BLD: 0.8 %
LDH SERPL L TO P-CCNC: 174 U/L
LYMPHOCYTES # BLD AUTO: 2.58 X10(3) UL (ref 1–4)
LYMPHOCYTES NFR BLD AUTO: 42.2 %
M PROTEIN MFR SERPL ELPH: 7.8 G/DL (ref 6.4–8.2)
MCH RBC QN AUTO: 26.7 PG (ref 26–34)
MCHC RBC AUTO-ENTMCNC: 33 G/DL (ref 31–37)
MCV RBC AUTO: 81 FL
MONOCYTES # BLD AUTO: 0.54 X10(3) UL (ref 0.1–1)
MONOCYTES NFR BLD AUTO: 8.8 %
NEUTROPHILS # BLD AUTO: 2.78 X10 (3) UL (ref 1.5–7.7)
NEUTROPHILS # BLD AUTO: 2.78 X10(3) UL (ref 1.5–7.7)
NEUTROPHILS NFR BLD AUTO: 45.6 %
OSMOLALITY SERPL CALC.SUM OF ELEC: 286 MOSM/KG (ref 275–295)
PATIENT FASTING Y/N/NP: NO
PLATELET # BLD AUTO: 293 10(3)UL (ref 150–450)
POTASSIUM SERPL-SCNC: 3.9 MMOL/L (ref 3.5–5.1)
RBC # BLD AUTO: 5.54 X10(6)UL
SODIUM SERPL-SCNC: 138 MMOL/L (ref 136–145)
WBC # BLD AUTO: 6.1 X10(3) UL (ref 4–11)

## 2021-03-15 PROCEDURE — 99214 OFFICE O/P EST MOD 30 MIN: CPT | Performed by: INTERNAL MEDICINE

## 2021-03-15 NOTE — PROGRESS NOTES
Cancer Center Progress Note    Problem List:      Patient Active Problem List:     Acute sinusitis, unspecified     Acute bronchitis     Acute pharyngitis     Screening for thyroid disorder     Screening for other and unspecified endocrine, nutritional, me Vitamin D deficiency        Past Surgical History:   Procedure Laterality Date   • APPENDECTOMY  age 18   • BIOPSY Left 06/2020    left neck LN biopsy   • CERVICAL LYMPH NODE BX Left 6/25/2020    Performed by Bonifacio Segundo MD at [facility] MAIN OR   • OTHER Ri palpable lymphadenopathy throughout in the cervical, supraclavicular, or axillary regions. Psychiatric: The patient's mood is calm and appropriate for this visit.       Labs reviewed at this visit:     Lab Results   Component Value Date    WBC 6.1 03/15/20  Recommend correlation with renal ultrasound for further   characterization. ADRENALS:  Normal.   AORTA/VASCULAR:  No aneurysm. RETROPERITONEUM:  No enlarged adenopathy. BOWEL/MESENTERY:  Normal caliber appendix.  Uncomplicated colonic diverticulosis to fibrin and/or chronic thrombus. Advise correlation with catheter functioning. Assessment/Plan:     Classical Hodgkin's Lymphoma, Lymphocyte-rich type:     He now has completed two cycles of ABVD. He has complete response on repeat CT/PET scan.  He

## 2021-03-19 ENCOUNTER — HOSPITAL ENCOUNTER (OUTPATIENT)
Dept: ULTRASOUND IMAGING | Age: 56
Discharge: HOME | End: 2021-03-19
Attending: INTERNAL MEDICINE
Payer: COMMERCIAL

## 2021-03-19 DIAGNOSIS — N28.89 LEFT KIDNEY MASS: ICD-10-CM

## 2021-03-19 PROCEDURE — 76770 US EXAM ABDO BACK WALL COMP: CPT | Performed by: INTERNAL MEDICINE

## 2021-03-23 ENCOUNTER — TELEPHONE (OUTPATIENT)
Dept: HEMATOLOGY/ONCOLOGY | Facility: HOSPITAL | Age: 56
End: 2021-03-23

## 2021-03-23 ENCOUNTER — OFFICE VISIT (OUTPATIENT)
Dept: SURGERY | Facility: CLINIC | Age: 56
End: 2021-03-23
Payer: COMMERCIAL

## 2021-03-23 VITALS — TEMPERATURE: 97 F | SYSTOLIC BLOOD PRESSURE: 135 MMHG | HEART RATE: 86 BPM | DIASTOLIC BLOOD PRESSURE: 86 MMHG

## 2021-03-23 DIAGNOSIS — N28.89 LEFT RENAL MASS: ICD-10-CM

## 2021-03-23 DIAGNOSIS — R82.90 URINE FINDING: Primary | ICD-10-CM

## 2021-03-23 LAB
APPEARANCE: CLEAR
MULTISTIX LOT#: 5077 NUMERIC
PH, URINE: 7 (ref 4.5–8)
SPECIFIC GRAVITY: 1.01 (ref 1–1.03)
UROBILINOGEN,SEMI-QN: 0.2 MG/DL (ref 0–1.9)

## 2021-03-23 PROCEDURE — 81003 URINALYSIS AUTO W/O SCOPE: CPT | Performed by: UROLOGY

## 2021-03-23 PROCEDURE — 3075F SYST BP GE 130 - 139MM HG: CPT | Performed by: UROLOGY

## 2021-03-23 PROCEDURE — 99203 OFFICE O/P NEW LOW 30 MIN: CPT | Performed by: UROLOGY

## 2021-03-23 PROCEDURE — 3079F DIAST BP 80-89 MM HG: CPT | Performed by: UROLOGY

## 2021-03-23 NOTE — PROGRESS NOTES
Rooming Clinician:     Soraya Chavis is a 50year old male. Patient presents with:  Consult: Solid hyperechoic mass of the left mid to lower pole at U/S on 3/19/21;  Recently completed chemo d/t Hodgkins lymphoma         HPI:     Patient comes to the office fo Cuff Size: large)   Pulse 86   Temp 97.2 °F (36.2 °C) (Temporal)   GENERAL: well developed, well nourished,in no apparent distress  SKIN: no rashes,no suspicious lesions  HEENT: atraumatic, normocephalic,ears and throat are clear  NECK: supple  LUNGS: norm HYPOPHARYNX:  No mass or other visible lesion. LARYNX:  The vocal cords are symmetric and without mass. SINUSES:  Limited views show no significant fluid or mucosal thickening.   NECK GLANDS:  The parotid, submandibular, and thyroid glands are unremarkabl transcribed by Technologist)  Lymphoma   CONTRAST USED:  100cc of Omnipaque 350  FINDINGS:   CHEST:  LUNGS:  Minimal subsegmental atelectasis in the lung bases. Stable 4 mm subpleural nodule in the right middle lobe, image 95. Shelli Noonan   MEDIASTINUM:  No enlarged Cholelithiasis.    Dictated by (CST): Geoff Lee MD on 3/08/2021 at 11:33 AM     Finalized by (CST): Geoff Lee MD on 3/08/2021 at 11:45 AM        KIDNEY/BLADDER (YIT=37521)    Result Date: 3/19/2021  PROCEDURE:   KIDNEY/BLADDER (CPT=76770)  CO active surveillance, surgical excision or even radiofrequency ablation. I discussed these various options with the patient including risks, benefits and possible complications. I reviewed the patient to NCCN guidelines for review of management as well.

## 2021-03-23 NOTE — TELEPHONE ENCOUNTER
Patient called indicating that he got a phone call from Dr. Sebas Cho last night 3/22/21. Please call him with test results. Thank you.  Korin

## 2021-03-26 ENCOUNTER — TELEPHONE (OUTPATIENT)
Dept: SURGERY | Facility: CLINIC | Age: 56
End: 2021-03-26

## 2021-03-26 DIAGNOSIS — N28.89 LEFT RENAL MASS: Primary | ICD-10-CM

## 2021-03-26 NOTE — TELEPHONE ENCOUNTER
Reviewed CT and PET scans with radiologist.  Small 1.8 cm solid left renal mass, superficial and peripheral in the lateral aspect of the left kidney ideally suited for radiofrequency ablation.   Patient would like to speak with interventional radiology for

## 2021-03-26 NOTE — TELEPHONE ENCOUNTER
Per pt states he was told by Dr. Ashley Mcqueen he would call him by today after speaking with Dr. Aleksey Sands, pt asking if Dr. Ashley Mcqueen spoke with Dr. Aleksey Sands already? Please call thank you.

## 2021-03-30 ENCOUNTER — NURSE ONLY (OUTPATIENT)
Dept: CT IMAGING | Facility: HOSPITAL | Age: 56
End: 2021-03-30

## 2021-03-30 ENCOUNTER — TELEPHONE (OUTPATIENT)
Dept: FAMILY MEDICINE CLINIC | Facility: CLINIC | Age: 56
End: 2021-03-30

## 2021-03-30 DIAGNOSIS — N28.89 LEFT RENAL MASS: Primary | ICD-10-CM

## 2021-03-30 NOTE — IMAGING NOTE
Spoke  to Uriah RN notified that patient needs history and  Physical completed with in the 30 days prior to the  for  Ct guided radiofrequency of the  L renal mass biopsy with anesthesia per hospital policy

## 2021-03-30 NOTE — IMAGING NOTE
Dr Tika Rodrigues office  called and  Spoke to RN  for adding anesthesia  order for the CT guided radiofrequency ablation.

## 2021-03-30 NOTE — TELEPHONE ENCOUNTER
Lyle Simmonds, RN from Radiology called and stated pt will be having left renal mass biopsy with anesthesia and states will need PCP clearance 30 days prior to procedure. No appt set up yet for the procedure.   Arya Duganing either her or pt will need to call our offi

## 2021-04-01 RX ORDER — SODIUM CHLORIDE, SODIUM LACTATE, POTASSIUM CHLORIDE, CALCIUM CHLORIDE 600; 310; 30; 20 MG/100ML; MG/100ML; MG/100ML; MG/100ML
INJECTION, SOLUTION INTRAVENOUS CONTINUOUS
Status: CANCELLED | OUTPATIENT
Start: 2021-04-01

## 2021-04-09 ENCOUNTER — OFFICE VISIT (OUTPATIENT)
Dept: FAMILY MEDICINE CLINIC | Facility: CLINIC | Age: 56
End: 2021-04-09
Payer: COMMERCIAL

## 2021-04-09 ENCOUNTER — PATIENT MESSAGE (OUTPATIENT)
Dept: FAMILY MEDICINE CLINIC | Facility: CLINIC | Age: 56
End: 2021-04-09

## 2021-04-09 VITALS
WEIGHT: 176 LBS | SYSTOLIC BLOOD PRESSURE: 110 MMHG | BODY MASS INDEX: 24.64 KG/M2 | HEIGHT: 71 IN | DIASTOLIC BLOOD PRESSURE: 62 MMHG | HEART RATE: 68 BPM | RESPIRATION RATE: 16 BRPM

## 2021-04-09 DIAGNOSIS — E78.1 HYPERTRIGLYCERIDEMIA: ICD-10-CM

## 2021-04-09 DIAGNOSIS — Z13.228 SCREENING FOR ENDOCRINE, METABOLIC AND IMMUNITY DISORDER: ICD-10-CM

## 2021-04-09 DIAGNOSIS — Z00.00 LABORATORY EXAMINATION ORDERED AS PART OF A ROUTINE GENERAL MEDICAL EXAMINATION: ICD-10-CM

## 2021-04-09 DIAGNOSIS — Z13.29 SCREENING FOR ENDOCRINE, METABOLIC AND IMMUNITY DISORDER: ICD-10-CM

## 2021-04-09 DIAGNOSIS — R31.29 MICROSCOPIC HEMATURIA: ICD-10-CM

## 2021-04-09 DIAGNOSIS — E78.6 LOW HDL (UNDER 40): ICD-10-CM

## 2021-04-09 DIAGNOSIS — E55.9 VITAMIN D DEFICIENCY: ICD-10-CM

## 2021-04-09 DIAGNOSIS — Z12.11 SCREENING FOR COLON CANCER: ICD-10-CM

## 2021-04-09 DIAGNOSIS — Z80.7: ICD-10-CM

## 2021-04-09 DIAGNOSIS — Z87.898 HISTORY OF PREDIABETES: ICD-10-CM

## 2021-04-09 DIAGNOSIS — Z71.85 VACCINE COUNSELING: ICD-10-CM

## 2021-04-09 DIAGNOSIS — N28.89 LEFT RENAL MASS: ICD-10-CM

## 2021-04-09 DIAGNOSIS — Z79.899 MEDICATION MANAGEMENT: ICD-10-CM

## 2021-04-09 DIAGNOSIS — D56.3 ALPHA THALASSEMIA SILENT CARRIER: ICD-10-CM

## 2021-04-09 DIAGNOSIS — Z13.0 SCREENING FOR ENDOCRINE, METABOLIC AND IMMUNITY DISORDER: ICD-10-CM

## 2021-04-09 DIAGNOSIS — R73.03 PREDIABETES: ICD-10-CM

## 2021-04-09 DIAGNOSIS — K76.0 FATTY LIVER: ICD-10-CM

## 2021-04-09 DIAGNOSIS — C81.41: ICD-10-CM

## 2021-04-09 DIAGNOSIS — Z86.39 HISTORY OF IRON DEFICIENCY: ICD-10-CM

## 2021-04-09 DIAGNOSIS — Z01.818 PREOP EXAMINATION: Primary | ICD-10-CM

## 2021-04-09 PROCEDURE — 99243 OFF/OP CNSLTJ NEW/EST LOW 30: CPT | Performed by: FAMILY MEDICINE

## 2021-04-09 PROCEDURE — 3008F BODY MASS INDEX DOCD: CPT | Performed by: FAMILY MEDICINE

## 2021-04-09 PROCEDURE — 3074F SYST BP LT 130 MM HG: CPT | Performed by: FAMILY MEDICINE

## 2021-04-09 PROCEDURE — 3078F DIAST BP <80 MM HG: CPT | Performed by: FAMILY MEDICINE

## 2021-04-09 NOTE — H&P
Juan Blas is a 50year old male who presents for a pre-operative physical exam. Patient is to have left renal mass biopsy and ablation with anesthesia at [facility] with Dr. Reginald Otero and Dr. Padmaja Masters on 4/14/2021    HPI:   Pt complains of renal mass.     Current O sugar closely and watches calories closely     REVIEW OF SYSTEMS:   GENERAL: feels well otherwise  SKIN: denies any unusual skin lesions  EYES:denies blurred vision or double vision  HEENT: denies nasal congestion, sinus pain or ST  LUNGS: denies shortness of neck (hcc)  Vitamin d deficiency  Hypertriglyceridemia  Low hdl (under 40)  Family history of burkitt's lymphoma  Medication management  Fatty liver  History of prediabetes  Screening for endocrine, metabolic and immunity disorder  Laboratory examinatio

## 2021-04-11 ENCOUNTER — LAB ENCOUNTER (OUTPATIENT)
Dept: LAB | Facility: HOSPITAL | Age: 56
End: 2021-04-11
Attending: UROLOGY
Payer: COMMERCIAL

## 2021-04-11 DIAGNOSIS — N28.89 LEFT RENAL MASS: ICD-10-CM

## 2021-04-12 RX ORDER — SODIUM CHLORIDE 9 MG/ML
INJECTION, SOLUTION INTRAVENOUS CONTINUOUS
Status: CANCELLED | OUTPATIENT
Start: 2021-04-12

## 2021-04-14 ENCOUNTER — ANESTHESIA EVENT (OUTPATIENT)
Dept: CT IMAGING | Facility: HOSPITAL | Age: 56
End: 2021-04-14
Payer: COMMERCIAL

## 2021-04-14 ENCOUNTER — HOSPITAL ENCOUNTER (OUTPATIENT)
Dept: CT IMAGING | Facility: HOSPITAL | Age: 56
Discharge: HOME | End: 2021-04-14
Attending: UROLOGY
Payer: COMMERCIAL

## 2021-04-14 ENCOUNTER — NURSE ONLY (OUTPATIENT)
Dept: LAB | Facility: HOSPITAL | Age: 56
End: 2021-04-14
Attending: UROLOGY
Payer: COMMERCIAL

## 2021-04-14 ENCOUNTER — ANESTHESIA (OUTPATIENT)
Dept: CT IMAGING | Facility: HOSPITAL | Age: 56
End: 2021-04-14
Payer: COMMERCIAL

## 2021-04-14 ENCOUNTER — TELEPHONE (OUTPATIENT)
Dept: SURGERY | Facility: CLINIC | Age: 56
End: 2021-04-14

## 2021-04-14 VITALS
DIASTOLIC BLOOD PRESSURE: 77 MMHG | SYSTOLIC BLOOD PRESSURE: 114 MMHG | RESPIRATION RATE: 17 BRPM | OXYGEN SATURATION: 100 % | TEMPERATURE: 97 F | BODY MASS INDEX: 25.2 KG/M2 | HEIGHT: 70 IN | WEIGHT: 176 LBS | HEART RATE: 54 BPM

## 2021-04-14 DIAGNOSIS — N28.89 LEFT RENAL MASS: Primary | ICD-10-CM

## 2021-04-14 DIAGNOSIS — N28.89 LEFT RENAL MASS: ICD-10-CM

## 2021-04-14 LAB
DEPRECATED RDW RBC AUTO: 40 FL (ref 35.1–46.3)
ERYTHROCYTE [DISTWIDTH] IN BLOOD BY AUTOMATED COUNT: 13.7 % (ref 11–15)
HCT VFR BLD AUTO: 44 %
HGB BLD-MCNC: 14.3 G/DL
INR BLD: 1.03 (ref 0.89–1.11)
MCH RBC QN AUTO: 26.7 PG (ref 26–34)
MCHC RBC AUTO-ENTMCNC: 32.5 G/DL (ref 31–37)
MCV RBC AUTO: 82.2 FL
PLATELET # BLD AUTO: 291 10(3)UL (ref 150–450)
PSA SERPL DL<=0.01 NG/ML-MCNC: 13.8 SECONDS (ref 12.4–14.6)
RBC # BLD AUTO: 5.35 X10(6)UL
WBC # BLD AUTO: 5 X10(3) UL (ref 4–11)

## 2021-04-14 PROCEDURE — 50200 RENAL BIOPSY PERQ: CPT | Performed by: UROLOGY

## 2021-04-14 PROCEDURE — 50592 PERC RF ABLATE RENAL TUMOR: CPT | Performed by: UROLOGY

## 2021-04-14 PROCEDURE — 36415 COLL VENOUS BLD VENIPUNCTURE: CPT

## 2021-04-14 PROCEDURE — 77012 CT SCAN FOR NEEDLE BIOPSY: CPT | Performed by: UROLOGY

## 2021-04-14 PROCEDURE — 85610 PROTHROMBIN TIME: CPT

## 2021-04-14 PROCEDURE — 88341 IMHCHEM/IMCYTCHM EA ADD ANTB: CPT | Performed by: UROLOGY

## 2021-04-14 PROCEDURE — 77013 CT GUIDE FOR TISSUE ABLATION: CPT | Performed by: UROLOGY

## 2021-04-14 PROCEDURE — 88342 IMHCHEM/IMCYTCHM 1ST ANTB: CPT | Performed by: UROLOGY

## 2021-04-14 PROCEDURE — 85027 COMPLETE CBC AUTOMATED: CPT

## 2021-04-14 PROCEDURE — 88305 TISSUE EXAM BY PATHOLOGIST: CPT | Performed by: UROLOGY

## 2021-04-14 RX ORDER — ONDANSETRON 2 MG/ML
4 INJECTION INTRAMUSCULAR; INTRAVENOUS AS NEEDED
Status: ACTIVE | OUTPATIENT
Start: 2021-04-14 | End: 2021-04-14

## 2021-04-14 RX ORDER — MIDAZOLAM HYDROCHLORIDE 1 MG/ML
INJECTION INTRAMUSCULAR; INTRAVENOUS AS NEEDED
Status: DISCONTINUED | OUTPATIENT
Start: 2021-04-14 | End: 2021-04-14 | Stop reason: SURG

## 2021-04-14 RX ORDER — NALOXONE HYDROCHLORIDE 0.4 MG/ML
80 INJECTION, SOLUTION INTRAMUSCULAR; INTRAVENOUS; SUBCUTANEOUS AS NEEDED
Status: ACTIVE | OUTPATIENT
Start: 2021-04-14 | End: 2021-04-14

## 2021-04-14 RX ORDER — ACETAMINOPHEN 325 MG/1
650 TABLET ORAL EVERY 6 HOURS PRN
Status: DISCONTINUED | OUTPATIENT
Start: 2021-04-14 | End: 2021-04-16

## 2021-04-14 RX ORDER — HYDROCODONE BITARTRATE AND ACETAMINOPHEN 5; 325 MG/1; MG/1
2 TABLET ORAL AS NEEDED
Status: COMPLETED | OUTPATIENT
Start: 2021-04-14 | End: 2021-04-14

## 2021-04-14 RX ORDER — HYDROCODONE BITARTRATE AND ACETAMINOPHEN 5; 325 MG/1; MG/1
1 TABLET ORAL EVERY 4 HOURS PRN
Status: DISCONTINUED | OUTPATIENT
Start: 2021-04-14 | End: 2021-04-16

## 2021-04-14 RX ORDER — HYDROMORPHONE HYDROCHLORIDE 1 MG/ML
0.4 INJECTION, SOLUTION INTRAMUSCULAR; INTRAVENOUS; SUBCUTANEOUS EVERY 5 MIN PRN
Status: ACTIVE | OUTPATIENT
Start: 2021-04-14 | End: 2021-04-14

## 2021-04-14 RX ORDER — SODIUM CHLORIDE, SODIUM LACTATE, POTASSIUM CHLORIDE, CALCIUM CHLORIDE 600; 310; 30; 20 MG/100ML; MG/100ML; MG/100ML; MG/100ML
INJECTION, SOLUTION INTRAVENOUS CONTINUOUS
Status: DISCONTINUED | OUTPATIENT
Start: 2021-04-14 | End: 2021-04-16

## 2021-04-14 RX ORDER — MEPERIDINE HYDROCHLORIDE 25 MG/ML
12.5 INJECTION INTRAMUSCULAR; INTRAVENOUS; SUBCUTANEOUS AS NEEDED
Status: DISCONTINUED | OUTPATIENT
Start: 2021-04-14 | End: 2021-04-16

## 2021-04-14 RX ORDER — ONDANSETRON 2 MG/ML
4 INJECTION INTRAMUSCULAR; INTRAVENOUS EVERY 6 HOURS PRN
Status: DISCONTINUED | OUTPATIENT
Start: 2021-04-14 | End: 2021-04-16

## 2021-04-14 RX ORDER — ACETAMINOPHEN 325 MG/1
TABLET ORAL
Status: COMPLETED
Start: 2021-04-14 | End: 2021-04-14

## 2021-04-14 RX ORDER — MORPHINE SULFATE 2 MG/ML
2 INJECTION, SOLUTION INTRAMUSCULAR; INTRAVENOUS EVERY 2 HOUR PRN
Status: DISCONTINUED | OUTPATIENT
Start: 2021-04-14 | End: 2021-04-16

## 2021-04-14 RX ORDER — HYDROCODONE BITARTRATE AND ACETAMINOPHEN 5; 325 MG/1; MG/1
2 TABLET ORAL EVERY 4 HOURS PRN
Status: DISCONTINUED | OUTPATIENT
Start: 2021-04-14 | End: 2021-04-16

## 2021-04-14 RX ORDER — HYDROCODONE BITARTRATE AND ACETAMINOPHEN 5; 325 MG/1; MG/1
1 TABLET ORAL AS NEEDED
Status: COMPLETED | OUTPATIENT
Start: 2021-04-14 | End: 2021-04-14

## 2021-04-14 RX ORDER — LIDOCAINE HYDROCHLORIDE 10 MG/ML
INJECTION, SOLUTION EPIDURAL; INFILTRATION; INTRACAUDAL; PERINEURAL AS NEEDED
Status: DISCONTINUED | OUTPATIENT
Start: 2021-04-14 | End: 2021-04-14 | Stop reason: SURG

## 2021-04-14 RX ADMIN — MIDAZOLAM HYDROCHLORIDE 2 MG: 1 INJECTION INTRAMUSCULAR; INTRAVENOUS at 09:32:00

## 2021-04-14 RX ADMIN — LIDOCAINE HYDROCHLORIDE 20 MG: 10 INJECTION, SOLUTION EPIDURAL; INFILTRATION; INTRACAUDAL; PERINEURAL at 10:23:00

## 2021-04-14 RX ADMIN — HYDROCODONE BITARTRATE AND ACETAMINOPHEN 1 TABLET: 5; 325 TABLET ORAL at 14:56:00

## 2021-04-14 RX ADMIN — ACETAMINOPHEN 650 MG: 325 TABLET ORAL at 11:30:00

## 2021-04-14 RX ADMIN — LIDOCAINE HYDROCHLORIDE 20 MG: 10 INJECTION, SOLUTION EPIDURAL; INFILTRATION; INTRACAUDAL; PERINEURAL at 09:32:00

## 2021-04-14 RX ADMIN — MORPHINE SULFATE 2 MG: 2 INJECTION, SOLUTION INTRAMUSCULAR; INTRAVENOUS at 13:32:00

## 2021-04-14 RX ADMIN — LIDOCAINE HYDROCHLORIDE 20 MG: 10 INJECTION, SOLUTION EPIDURAL; INFILTRATION; INTRACAUDAL; PERINEURAL at 10:31:00

## 2021-04-14 NOTE — IMAGING NOTE
Dressing done to the  Site with Tegaderm and  Antibiotic ointment. Dressing dry and intact. Patient transferred to [address] [place] [address] by cart.  Dr Michelle Landin with patient

## 2021-04-14 NOTE — ANESTHESIA PREPROCEDURE EVALUATION
PRE-OP EVALUATION    Patient Name: Pura Malcolm    Admit Diagnosis: Left renal mass [N28.89]    Pre-op Diagnosis: * No surgery found *        Anesthesia Procedure: CT KIDNEY RADFREQUENCY ABLATION (CPT=77013/28734)    * Surgery not found *    Pre-op vitals rev TONSILLECTOMY N/A 5/27/2016    Performed by Onel Luther MD at [address] History    Tobacco Use      Smoking status: Never Smoker      Smokeless tobacco: Never Used    Alcohol use: Not Currently      Drug use: No     Available pre-op l

## 2021-04-14 NOTE — TELEPHONE ENCOUNTER
RN received a call from Juvencio Khan from Radiology stating that patient is currently in there to have a CT ablation, who requested for a renal biopsy. MD obtained one and Nirav Lainez needs an order to link it. RN placed the order for CT renal biopsy.

## 2021-04-14 NOTE — ANESTHESIA POSTPROCEDURE EVALUATION
[address] [facility] Patient Status:  Outpatient   Age/Gender 50year old male MRN [id number]   [facility] Rodolfo Madden MD   Hosp Day # 0 PCP Papi Longoria DO       Anesthesia Post-op Note        Procedure Summary

## 2021-04-14 NOTE — PROCEDURES
[facility]  Procedure Note    Olga Fears Patient Status:  Outpatient    6/15/1965 MRN [id number]   [facility] CT Attending Aimee Angela MD   Hosp Day # 0 PCP Prudence Rodriguez DO     Procedure: ablation and biosy of left kidney mass

## 2021-05-06 ENCOUNTER — OFFICE VISIT (OUTPATIENT)
Dept: SURGERY | Facility: CLINIC | Age: 56
End: 2021-05-06
Payer: COMMERCIAL

## 2021-05-06 VITALS — SYSTOLIC BLOOD PRESSURE: 132 MMHG | HEART RATE: 61 BPM | DIASTOLIC BLOOD PRESSURE: 83 MMHG

## 2021-05-06 DIAGNOSIS — R82.90 URINE FINDING: ICD-10-CM

## 2021-05-06 DIAGNOSIS — C64.2 RENAL CELL CANCER, LEFT (HCC): Primary | ICD-10-CM

## 2021-05-06 PROCEDURE — 81003 URINALYSIS AUTO W/O SCOPE: CPT | Performed by: UROLOGY

## 2021-05-06 PROCEDURE — 3075F SYST BP GE 130 - 139MM HG: CPT | Performed by: UROLOGY

## 2021-05-06 PROCEDURE — 3079F DIAST BP 80-89 MM HG: CPT | Performed by: UROLOGY

## 2021-05-06 PROCEDURE — 99213 OFFICE O/P EST LOW 20 MIN: CPT | Performed by: UROLOGY

## 2021-05-06 NOTE — ADDENDUM NOTE
Addended by: Hong Barrios on: 5/6/2021 09:04 AM     Modules accepted: Orders CHIEF COMPLAINT/HISTORY OF PRESENT ILLNESS:    Brandy Cortes is a 15 year old female that presents to the Urgent Care with Father Gaudencio for   Chief Complaint   Patient presents with   • Derm Problem     Brandy has been having bilateral rash on her arms that been going on for a couple weeks. They have not used any over-the-counter medications. No sick contacts. No changes in past. She's not having any fever, chills, reduced range of motion, nausea, or vomiting. She does report a bilateral rash on her arms that been going on for several weeks. Patient does have a history of eczema which is located on her neck which tends to be a dry patch of skin.  Reviewed RN's notes.    Past Medical History:  Problem list: Reviewed.  Allergies:   ALLERGIES:   Allergen Reactions   • Seasonal Other (See Comments)     Sneezing, watery eyes, itching.        Medication list:  Current Outpatient Medications   Medication Sig Dispense Refill   • ketoconazole (NIZORAL) 2 % cream Apply topically daily. 30 g 0   • triamcinolone (ARISTOCORT) 0.1 % cream Apply topically 2 times daily. Very thin layer.  NOT TO FACE 28.4 g 5   • loratadine (CLARITIN) 10 MG tablet Take 10 mg by mouth as needed for Allergies.       No current facility-administered medications for this visit.      Social History:  Nonsmoker, 8th grader      GENERAL:  General appearance:  Well developed and well nourished.  VITAL SIGNS: WNL, Afebrile  Vitals:    01/14/19 0924   BP: 108/56   Pulse: 92   Resp: 22   Temp: 98.2 °F (36.8 °C)   TempSrc: Temporal Artery   SpO2: 98%   Weight: 51 kg   PainSc:  0     SKIN:  Clear to inspection and palpation.  Annular lesion with raised edges and central clearing noted over bilateral antecubital. No drainage noted. Dry patch noted on the anterior neck distally to the chin.   HEAD:  Normocephalic   EYES: Conjunctivae and lids clear without injection.  GREG.   HEART:  Normal S1 and S2.  No murmurs, rubs or gallops.   RESPIRATORY:  Full, easy  respiratory effort.  Clear to auscultation bilaterally.    PSYCHOLOGICAL: Alert and oriented x3, Pleasant, Cooperative    Procedures/Diagnostic Tests: none    Brandy was seen today for derm problem.    Diagnoses and all orders for this visit:    Skin lesion of left arm    Skin lesion of right arm    Other orders  -     ketoconazole (NIZORAL) 2 % cream; Apply topically daily.  -     triamcinolone (ARISTOCORT) 0.1 % cream; Apply topically 2 times daily. Very thin layer.  NOT TO FACE      I discussed with them today that her rash is most consistent with a ringworm type infection. I will order Ketoconazole 2% cream that she may apply daily.  I truly believe this is more likely a tinea corpus versus eczema. Patient does have eczema but that patches located on her neck and it tends to be more dry skin. She does not have any creams that they use on this. I like them to use the antifungal cream for the next 2 weeks regularly. If that fails to make it improve they may try the triamcinolone cream 0.1% that they may apply 2 times daily in a very thin layer but not to her face.  They are in agreement with this plan.  For any worsening symptoms return to the clinic, primary care provider, or emergency room.     They were appreciative of her care today.      KUN Wetzel: DARIN  This patient was seen under the supervision of Dr. Greer

## 2021-05-06 NOTE — PROGRESS NOTES
Rooming Clinician:     Sharonda Aldana is a 50year old male. Patient presents with: Follow - Up: CT results    Miscellaneous Urology:  Chief Complaint: Patient presents with:   Follow - Up: CT results      Pain: No  Injury or Trauma: No        HPI:     Patient exertion  CARDIOVASCULAR: denies chest pain on exertion  GI: denies abdominal pain and denies heartburn  : see HPI  NEURO: no sensory or motor complaint    EXAM:     /83 (BP Location: Right arm, Patient Position: Sitting, Cuff Size: adult)   Pulse

## 2021-05-22 ENCOUNTER — LAB ENCOUNTER (OUTPATIENT)
Dept: LAB | Facility: HOSPITAL | Age: 56
End: 2021-05-22
Attending: INTERNAL MEDICINE
Payer: COMMERCIAL

## 2021-05-22 DIAGNOSIS — Z01.818 PRE-OP TESTING: ICD-10-CM

## 2021-05-25 ENCOUNTER — PATIENT MESSAGE (OUTPATIENT)
Dept: FAMILY MEDICINE CLINIC | Facility: CLINIC | Age: 56
End: 2021-05-25

## 2021-05-25 PROBLEM — K63.5 COLON POLYP: Status: ACTIVE | Noted: 2021-05-25

## 2021-05-25 PROBLEM — Z12.11 SPECIAL SCREENING FOR MALIGNANT NEOPLASM OF COLON: Status: ACTIVE | Noted: 2021-05-25

## 2021-05-25 PROBLEM — K57.30 COLON, DIVERTICULOSIS: Status: ACTIVE | Noted: 2021-05-25

## 2021-05-25 PROBLEM — K64.8 INTERNAL HEMORRHOIDS: Status: ACTIVE | Noted: 2021-05-25

## 2021-05-25 NOTE — TELEPHONE ENCOUNTER
From: Eva Sosa  To:  Haylee Núñez DO  Sent: 5/25/2021 1:22 PM CDT  Subject: Other    Hi     Please see attached herewith my Covid vaccination card for your reference and updating my records    Thanks  [facility]

## 2021-06-02 ENCOUNTER — HOSPITAL ENCOUNTER (OUTPATIENT)
Dept: CT IMAGING | Facility: HOSPITAL | Age: 56
Discharge: HOME | End: 2021-06-02
Attending: UROLOGY
Payer: COMMERCIAL

## 2021-06-02 DIAGNOSIS — C64.2 RENAL CELL CANCER, LEFT (HCC): ICD-10-CM

## 2021-06-02 PROCEDURE — 82565 ASSAY OF CREATININE: CPT

## 2021-06-02 PROCEDURE — 74178 CT ABD&PLV WO CNTR FLWD CNTR: CPT | Performed by: UROLOGY

## 2021-06-04 NOTE — PROGRESS NOTES
Your recent CT scan post radiofrequency ablation shows that the tumor has been completely ablated. Findings on the CT scan are normal and expected after radiofrequency ablation. Recommend follow up in the office 6 months.     Sincerely,  Dada Sharma MD

## 2021-06-08 ENCOUNTER — LAB ENCOUNTER (OUTPATIENT)
Dept: LAB | Age: 56
End: 2021-06-08
Attending: FAMILY MEDICINE
Payer: COMMERCIAL

## 2021-06-08 DIAGNOSIS — Z13.29 SCREENING FOR ENDOCRINE, METABOLIC AND IMMUNITY DISORDER: ICD-10-CM

## 2021-06-08 DIAGNOSIS — Z00.00 LABORATORY EXAMINATION ORDERED AS PART OF A ROUTINE GENERAL MEDICAL EXAMINATION: ICD-10-CM

## 2021-06-08 DIAGNOSIS — E55.9 VITAMIN D DEFICIENCY: ICD-10-CM

## 2021-06-08 DIAGNOSIS — C81.41: ICD-10-CM

## 2021-06-08 DIAGNOSIS — Z13.0 SCREENING FOR ENDOCRINE, METABOLIC AND IMMUNITY DISORDER: ICD-10-CM

## 2021-06-08 DIAGNOSIS — Z13.228 SCREENING FOR ENDOCRINE, METABOLIC AND IMMUNITY DISORDER: ICD-10-CM

## 2021-06-08 DIAGNOSIS — Z87.898 HISTORY OF PREDIABETES: ICD-10-CM

## 2021-06-08 DIAGNOSIS — E78.1 HYPERTRIGLYCERIDEMIA: ICD-10-CM

## 2021-06-08 DIAGNOSIS — E78.6 LOW HDL (UNDER 40): ICD-10-CM

## 2021-06-08 PROCEDURE — 86708 HEPATITIS A ANTIBODY: CPT | Performed by: FAMILY MEDICINE

## 2021-06-08 PROCEDURE — 82306 VITAMIN D 25 HYDROXY: CPT | Performed by: FAMILY MEDICINE

## 2021-06-08 PROCEDURE — 86709 HEPATITIS A IGM ANTIBODY: CPT | Performed by: FAMILY MEDICINE

## 2021-06-08 PROCEDURE — 86706 HEP B SURFACE ANTIBODY: CPT | Performed by: FAMILY MEDICINE

## 2021-06-08 PROCEDURE — 83036 HEMOGLOBIN GLYCOSYLATED A1C: CPT | Performed by: FAMILY MEDICINE

## 2021-06-08 PROCEDURE — 84153 ASSAY OF PSA TOTAL: CPT | Performed by: FAMILY MEDICINE

## 2021-06-08 PROCEDURE — 80061 LIPID PANEL: CPT | Performed by: FAMILY MEDICINE

## 2021-06-10 DIAGNOSIS — E55.9 VITAMIN D DEFICIENCY: Primary | ICD-10-CM

## 2021-06-10 RX ORDER — ERGOCALCIFEROL 1.25 MG/1
50000 CAPSULE ORAL WEEKLY
Qty: 12 CAPSULE | Refills: 0 | Status: CANCELLED | OUTPATIENT
Start: 2021-06-10 | End: 2021-08-27

## 2021-06-14 ENCOUNTER — OFFICE VISIT (OUTPATIENT)
Dept: HEMATOLOGY/ONCOLOGY | Facility: HOSPITAL | Age: 56
End: 2021-06-14
Attending: INTERNAL MEDICINE
Payer: COMMERCIAL

## 2021-06-14 VITALS
OXYGEN SATURATION: 98 % | HEART RATE: 77 BPM | WEIGHT: 170 LBS | DIASTOLIC BLOOD PRESSURE: 81 MMHG | RESPIRATION RATE: 16 BRPM | SYSTOLIC BLOOD PRESSURE: 150 MMHG | BODY MASS INDEX: 23.8 KG/M2 | TEMPERATURE: 97 F | HEIGHT: 70.98 IN

## 2021-06-14 DIAGNOSIS — C81.41: Primary | ICD-10-CM

## 2021-06-14 PROCEDURE — 99214 OFFICE O/P EST MOD 30 MIN: CPT | Performed by: INTERNAL MEDICINE

## 2021-06-14 NOTE — PROGRESS NOTES
Cancer Center Progress Note    Problem List:      Patient Active Problem List:     Acute sinusitis, unspecified     Acute bronchitis     Acute pharyngitis     Screening for thyroid disorder     Screening for other and unspecified endocrine, nutritional, me palpitations. Integument/breast: Negative for rash, skin lesions, and pruritus. Hematologic/lymphatic: Negative for easy bruising, bleeding, and lymphadenopathy. Musculoskeletal: Negative for myalgias, arthralgias, muscle weakness.   Genitourinary: Negat (06/14 1501)  BSA (Calculated - sq m): 1.97 sq meters (06/14 1501)  Pulse: 77 (06/14 1501)  BP: 150/81 (06/14 1501)  Temp: 97.3 °F (36.3 °C) (06/14 1501)  Do Not Use - Resp Rate: --  SpO2: 98 % (06/14 1501)      Performance Status:  ECOG 0: Fully active, a image 95. .     MEDIASTINUM:  No enlarged mediastinal adenopathy.  Small esophageal hiatal hernia.     NATACHA:  No enlarged hilar adenopathy.     CARDIAC:  No enlargement, pericardial thickening, or significant calcification.    PLEURA:  No pneumothorax or ef the left neck related to previously demonstrated FDG avid lymph nodes. The lymph nodes all have decreased in size as well, and all are now subcentimeter. No new or enlarging mass, or new area of abnormal FDG activity.  There is iatrogenic linear activity re

## 2021-06-29 ENCOUNTER — OFFICE VISIT (OUTPATIENT)
Dept: FAMILY MEDICINE CLINIC | Facility: CLINIC | Age: 56
End: 2021-06-29
Payer: COMMERCIAL

## 2021-06-29 VITALS
WEIGHT: 170 LBS | SYSTOLIC BLOOD PRESSURE: 120 MMHG | HEART RATE: 72 BPM | RESPIRATION RATE: 16 BRPM | BODY MASS INDEX: 24.34 KG/M2 | HEIGHT: 70 IN | TEMPERATURE: 98 F | DIASTOLIC BLOOD PRESSURE: 78 MMHG

## 2021-06-29 DIAGNOSIS — R31.29 MICROSCOPIC HEMATURIA: ICD-10-CM

## 2021-06-29 DIAGNOSIS — R73.03 PREDIABETES: Primary | ICD-10-CM

## 2021-06-29 DIAGNOSIS — Z85.528 HISTORY OF RENAL CELL CARCINOMA: ICD-10-CM

## 2021-06-29 DIAGNOSIS — E78.1 HYPERTRIGLYCERIDEMIA: ICD-10-CM

## 2021-06-29 DIAGNOSIS — Z86.39 HISTORY OF IRON DEFICIENCY: ICD-10-CM

## 2021-06-29 DIAGNOSIS — Z13.89 SCREENING FOR GENITOURINARY CONDITION: ICD-10-CM

## 2021-06-29 DIAGNOSIS — E55.9 VITAMIN D DEFICIENCY: ICD-10-CM

## 2021-06-29 DIAGNOSIS — E78.6 LOW HDL (UNDER 40): ICD-10-CM

## 2021-06-29 DIAGNOSIS — Z71.85 VACCINE COUNSELING: ICD-10-CM

## 2021-06-29 DIAGNOSIS — C81.41: ICD-10-CM

## 2021-06-29 DIAGNOSIS — Z80.7: ICD-10-CM

## 2021-06-29 DIAGNOSIS — K76.0 FATTY LIVER: ICD-10-CM

## 2021-06-29 DIAGNOSIS — D56.3 ALPHA THALASSEMIA SILENT CARRIER: ICD-10-CM

## 2021-06-29 DIAGNOSIS — Z00.00 LABORATORY EXAMINATION ORDERED AS PART OF A ROUTINE GENERAL MEDICAL EXAMINATION: ICD-10-CM

## 2021-06-29 DIAGNOSIS — Z79.899 MEDICATION MANAGEMENT: ICD-10-CM

## 2021-06-29 PROCEDURE — 99214 OFFICE O/P EST MOD 30 MIN: CPT | Performed by: FAMILY MEDICINE

## 2021-06-29 PROCEDURE — 3008F BODY MASS INDEX DOCD: CPT | Performed by: FAMILY MEDICINE

## 2021-06-29 PROCEDURE — 3078F DIAST BP <80 MM HG: CPT | Performed by: FAMILY MEDICINE

## 2021-06-29 PROCEDURE — 90472 IMMUNIZATION ADMIN EACH ADD: CPT | Performed by: FAMILY MEDICINE

## 2021-06-29 PROCEDURE — 3074F SYST BP LT 130 MM HG: CPT | Performed by: FAMILY MEDICINE

## 2021-06-29 PROCEDURE — 90746 HEPB VACCINE 3 DOSE ADULT IM: CPT | Performed by: FAMILY MEDICINE

## 2021-06-29 PROCEDURE — 90471 IMMUNIZATION ADMIN: CPT | Performed by: FAMILY MEDICINE

## 2021-06-29 PROCEDURE — 90750 HZV VACC RECOMBINANT IM: CPT | Performed by: FAMILY MEDICINE

## 2021-06-29 NOTE — PROGRESS NOTES
Darrion Solares is a 49year old male. HPI:   Patient is here for medication visit. Started new job May 24, 2021. Saw Dr. Leann Barrett -- 1 polyp.     Hodgkin's lymphoma–per Dr. Brigido Chinchilla and recently finished chemo and radiation; doing well    Walking a lot -- 5 miles a 16 - 61 U/L    Alkaline Phosphatase 61 45 - 117 U/L    Bilirubin, Total 0.4 0.1 - 2.0 mg/dL    Total Protein 7.9 6.4 - 8.2 g/dL    Albumin 4.1 3.4 - 5.0 g/dL    Globulin  3.8 2.8 - 4.4 g/dL    A/G Ratio 1.1 1.0 - 2.0    FASTING Yes    LDH   Result Value Re Lymphocyte % 33.6 %    Monocyte % 7.6 %    Eosinophil % 3.6 %    Basophil % 0.9 %    Immature Granulocyte % 0.6 %       REVIEW OF SYSTEMS:   GENERAL: feels well otherwise  SKIN: denies any unusual skin lesions  EYES:denies blurred vision or double vision With Platelet      Urinalysis with Culture Reflex      Meds & Refills for this Visit:  Requested Prescriptions      No prescriptions requested or ordered in this encounter       Imaging & Consults:  None     Lymphocyte-rich hodgkin lymphoma of lymph nodes

## 2021-07-05 ENCOUNTER — PATIENT MESSAGE (OUTPATIENT)
Dept: SURGERY | Facility: CLINIC | Age: 56
End: 2021-07-05

## 2021-07-06 NOTE — TELEPHONE ENCOUNTER
Per patient has questions about the [employer] message sent by Melanie Cox (see below).  Please advise

## 2021-07-06 NOTE — TELEPHONE ENCOUNTER
Patient calling to provide fax number [phone] for his insurance,Patient request to fax the doctors documents as discussed on phone, thanks.

## 2021-07-06 NOTE — TELEPHONE ENCOUNTER
This RN called patient in response to his call:     \"Per patient has questions about the [employer] sent by Nasim Roberto (see below). Please advise. \"    RN spoke to patient at length about the denial of his CT ablation.  Per patient, he did not get answers

## 2021-07-06 NOTE — TELEPHONE ENCOUNTER
Below is patient's message sent via [employer]:     From: Darshana Score  To: Ora Cifuentes MD  Sent: 7/5/2021  9:39 PM CDT  Subject: Visit Follow-up Question    Hi Dr Esteban Gama this finds you well.  I need your help as I am unable to send this email to

## 2021-07-30 ENCOUNTER — NURSE ONLY (OUTPATIENT)
Dept: FAMILY MEDICINE CLINIC | Facility: CLINIC | Age: 56
End: 2021-07-30
Payer: COMMERCIAL

## 2021-07-30 PROCEDURE — 90746 HEPB VACCINE 3 DOSE ADULT IM: CPT | Performed by: FAMILY MEDICINE

## 2021-07-30 PROCEDURE — 90471 IMMUNIZATION ADMIN: CPT | Performed by: FAMILY MEDICINE

## 2021-07-30 NOTE — PROGRESS NOTES
Pt was seen today for his 2nd of 3 Hep B vaccines. Injection given, pt handled well. Advised pt to return for his 3rd injection in 5 months. Pt stated that he already has an appointment scheduled.

## 2021-09-01 NOTE — TELEPHONE ENCOUNTER
Received Letter of appeals denial from insurance for ablation. Vladimir can you please look into this? Thanks.     Claim #[id number]  Appeals ID # [id number]

## 2021-09-13 ENCOUNTER — LAB ENCOUNTER (OUTPATIENT)
Dept: LAB | Age: 56
End: 2021-09-13
Attending: INTERNAL MEDICINE
Payer: COMMERCIAL

## 2021-09-13 ENCOUNTER — OFFICE VISIT (OUTPATIENT)
Dept: HEMATOLOGY/ONCOLOGY | Facility: HOSPITAL | Age: 56
End: 2021-09-13
Attending: INTERNAL MEDICINE
Payer: COMMERCIAL

## 2021-09-13 VITALS
HEART RATE: 76 BPM | SYSTOLIC BLOOD PRESSURE: 142 MMHG | TEMPERATURE: 98 F | WEIGHT: 172 LBS | OXYGEN SATURATION: 98 % | RESPIRATION RATE: 18 BRPM | BODY MASS INDEX: 25 KG/M2 | DIASTOLIC BLOOD PRESSURE: 90 MMHG

## 2021-09-13 DIAGNOSIS — C81.41: ICD-10-CM

## 2021-09-13 DIAGNOSIS — Z13.89 SCREENING FOR GENITOURINARY CONDITION: ICD-10-CM

## 2021-09-13 DIAGNOSIS — C81.41: Primary | ICD-10-CM

## 2021-09-13 PROBLEM — C64.2 CANCER OF LEFT KIDNEY (HCC): Status: ACTIVE | Noted: 2021-09-13

## 2021-09-13 LAB
ALBUMIN SERPL-MCNC: 4.2 G/DL (ref 3.4–5)
ALBUMIN/GLOB SERPL: 1.1 {RATIO} (ref 1–2)
ALP LIVER SERPL-CCNC: 59 U/L
ALT SERPL-CCNC: 44 U/L
ANION GAP SERPL CALC-SCNC: 6 MMOL/L (ref 0–18)
AST SERPL-CCNC: 25 U/L (ref 15–37)
BASOPHILS # BLD AUTO: 0.05 X10(3) UL (ref 0–0.2)
BASOPHILS NFR BLD AUTO: 0.8 %
BILIRUB SERPL-MCNC: 0.4 MG/DL (ref 0.1–2)
BILIRUB UR QL STRIP.AUTO: NEGATIVE
BUN BLD-MCNC: 12 MG/DL (ref 7–18)
CALCIUM BLD-MCNC: 9.3 MG/DL (ref 8.5–10.1)
CHLORIDE SERPL-SCNC: 108 MMOL/L (ref 98–112)
CLARITY UR REFRACT.AUTO: CLEAR
CO2 SERPL-SCNC: 27 MMOL/L (ref 21–32)
COLOR UR AUTO: YELLOW
CREAT BLD-MCNC: 1.1 MG/DL
EOSINOPHIL # BLD AUTO: 0.15 X10(3) UL (ref 0–0.7)
EOSINOPHIL NFR BLD AUTO: 2.4 %
ERYTHROCYTE [DISTWIDTH] IN BLOOD BY AUTOMATED COUNT: 13.6 %
GLOBULIN PLAS-MCNC: 3.9 G/DL (ref 2.8–4.4)
GLUCOSE BLD-MCNC: 103 MG/DL (ref 70–99)
GLUCOSE UR STRIP.AUTO-MCNC: NEGATIVE MG/DL
HCT VFR BLD AUTO: 47.1 %
HGB BLD-MCNC: 15.1 G/DL
IMM GRANULOCYTES # BLD AUTO: 0.02 X10(3) UL (ref 0–1)
IMM GRANULOCYTES NFR BLD: 0.3 %
KETONES UR STRIP.AUTO-MCNC: NEGATIVE MG/DL
LDH SERPL L TO P-CCNC: 179 U/L
LEUKOCYTE ESTERASE UR QL STRIP.AUTO: NEGATIVE
LYMPHOCYTES # BLD AUTO: 2.74 X10(3) UL (ref 1–4)
LYMPHOCYTES NFR BLD AUTO: 44.6 %
M PROTEIN MFR SERPL ELPH: 8.1 G/DL (ref 6.4–8.2)
MCH RBC QN AUTO: 26.6 PG (ref 26–34)
MCHC RBC AUTO-ENTMCNC: 32.1 G/DL (ref 31–37)
MCV RBC AUTO: 83.1 FL
MONOCYTES # BLD AUTO: 0.51 X10(3) UL (ref 0.1–1)
MONOCYTES NFR BLD AUTO: 8.3 %
NEUTROPHILS # BLD AUTO: 2.67 X10 (3) UL (ref 1.5–7.7)
NEUTROPHILS # BLD AUTO: 2.67 X10(3) UL (ref 1.5–7.7)
NEUTROPHILS NFR BLD AUTO: 43.6 %
NITRITE UR QL STRIP.AUTO: NEGATIVE
OSMOLALITY SERPL CALC.SUM OF ELEC: 292 MOSM/KG (ref 275–295)
PATIENT FASTING Y/N/NP: YES
PH UR STRIP.AUTO: 5 [PH] (ref 5–8)
PLATELET # BLD AUTO: 325 10(3)UL (ref 150–450)
POTASSIUM SERPL-SCNC: 3.9 MMOL/L (ref 3.5–5.1)
PROT UR STRIP.AUTO-MCNC: NEGATIVE MG/DL
RBC # BLD AUTO: 5.67 X10(6)UL
RBC UR QL AUTO: NEGATIVE
SODIUM SERPL-SCNC: 141 MMOL/L (ref 136–145)
SP GR UR STRIP.AUTO: 1.02 (ref 1–1.03)
UROBILINOGEN UR STRIP.AUTO-MCNC: <2 MG/DL
WBC # BLD AUTO: 6.1 X10(3) UL (ref 4–11)

## 2021-09-13 PROCEDURE — 36415 COLL VENOUS BLD VENIPUNCTURE: CPT

## 2021-09-13 PROCEDURE — 99214 OFFICE O/P EST MOD 30 MIN: CPT | Performed by: INTERNAL MEDICINE

## 2021-09-13 PROCEDURE — 81003 URINALYSIS AUTO W/O SCOPE: CPT

## 2021-09-13 PROCEDURE — 83615 LACTATE (LD) (LDH) ENZYME: CPT

## 2021-09-13 PROCEDURE — 80053 COMPREHEN METABOLIC PANEL: CPT

## 2021-09-13 PROCEDURE — 85025 COMPLETE CBC W/AUTO DIFF WBC: CPT

## 2021-09-13 NOTE — PROGRESS NOTES
Cancer Center Progress Note    Problem List:      Patient Active Problem List:     Acute sinusitis, unspecified     Acute bronchitis     Acute pharyngitis     Screening for thyroid disorder     Screening for other and unspecified endocrine, nutritional, me palpitations. Integument/breast: Negative for rash, skin lesions, and pruritus. Hematologic/lymphatic: Negative for easy bruising, bleeding, and lymphadenopathy. Musculoskeletal: Negative for myalgias, arthralgias, muscle weakness.   Genitourinary: Negat restriction     Physical Examination:    Constitutional: Patient is alert and not in acute distress. Respiratory: Clear to auscultation and percussion. No rales. No wheezes. Cardiovascular: Regular rate and rhythm. No murmurs.   Gastrointestinal: Soft, n infiltration of the liver.     BILIARY:  Cholelithiasis.  No biliary ductal dilatation. PANCREAS:  Homogeneous enhancement. SPLEEN:  Normal caliber.    KIDNEYS:  No hydronephrosis. Claretta Minus is a 1.8 cm heterogeneous mass arising from the midpole      of t previously demonstrated abnormal activity in the left neck related to previously demonstrated enlarged and FDG avid lymph nodes. In addition to no longer showing FDG uptake, the lymph nodes are all smaller, all subcentimeter.      2. Iatrogenic activity dlalas

## 2021-09-14 ENCOUNTER — PATIENT MESSAGE (OUTPATIENT)
Dept: FAMILY MEDICINE CLINIC | Facility: CLINIC | Age: 56
End: 2021-09-14

## 2021-09-14 NOTE — TELEPHONE ENCOUNTER
From: Simon Mata  To: Rd Cabral DO  Sent: 9/14/2021 7:00 AM CDT  Subject: Blood Tests Order    Aramis Venegas    How are you? Before my appointment with you on 09/27 we had planned to do the following blood tests.  I have appointment to do the blood work on 09/2

## 2021-09-15 NOTE — TELEPHONE ENCOUNTER
Good Morning      I followed up with current insurance on file, was on hold for over 30 minutes    I left message with patient cell to contact me at managed care    I have [employer] and not [employer] phone    I called Selene Berrios  [phone]    If its [employer] - I need the

## 2021-09-22 ENCOUNTER — TELEPHONE (OUTPATIENT)
Dept: SURGERY | Facility: CLINIC | Age: 56
End: 2021-09-22

## 2021-09-22 NOTE — TELEPHONE ENCOUNTER
This RN received a call from [address] [place] [address] to inform that patient already called him to inform that the Insurance situation was already been taken cared. This encounter is now closed.

## 2021-09-22 NOTE — TELEPHONE ENCOUNTER
This RN attempted to reach out with patient via [employer], but it's inactive: This RN called patient. No answer. Left message to call Managed Care or office for clarification of his insurance information.  Apparently, our system has the [employer]

## 2021-09-22 NOTE — TELEPHONE ENCOUNTER
Good Morning    Telephone encounter 7/5/2021 has been taken care of    Patient call me directly and the [facility] April appeal has been taken care of.     Thanks  [facility]

## 2021-09-25 ENCOUNTER — LAB ENCOUNTER (OUTPATIENT)
Dept: LAB | Age: 56
End: 2021-09-25
Attending: FAMILY MEDICINE
Payer: COMMERCIAL

## 2021-09-25 DIAGNOSIS — E55.9 VITAMIN D DEFICIENCY: ICD-10-CM

## 2021-09-25 DIAGNOSIS — R73.03 PREDIABETES: ICD-10-CM

## 2021-09-25 DIAGNOSIS — Z00.00 LABORATORY EXAMINATION ORDERED AS PART OF A ROUTINE GENERAL MEDICAL EXAMINATION: ICD-10-CM

## 2021-09-25 DIAGNOSIS — E78.1 HYPERTRIGLYCERIDEMIA: ICD-10-CM

## 2021-09-25 LAB
CHOLEST SERPL-MCNC: 188 MG/DL (ref ?–200)
EST. AVERAGE GLUCOSE BLD GHB EST-MCNC: 126 MG/DL (ref 68–126)
HBA1C MFR BLD HPLC: 6 % (ref ?–5.7)
HDLC SERPL-MCNC: 33 MG/DL (ref 40–59)
LDLC SERPL CALC-MCNC: 123 MG/DL (ref ?–100)
NONHDLC SERPL-MCNC: 155 MG/DL (ref ?–130)
TRIGL SERPL-MCNC: 178 MG/DL (ref 30–149)
TSI SER-ACNC: 2.06 MIU/ML (ref 0.36–3.74)
VIT D+METAB SERPL-MCNC: 51.4 NG/ML (ref 30–100)
VLDLC SERPL CALC-MCNC: 32 MG/DL (ref 0–30)

## 2021-09-25 PROCEDURE — 80061 LIPID PANEL: CPT

## 2021-09-25 PROCEDURE — 83036 HEMOGLOBIN GLYCOSYLATED A1C: CPT

## 2021-09-25 PROCEDURE — 36415 COLL VENOUS BLD VENIPUNCTURE: CPT

## 2021-09-25 PROCEDURE — 82306 VITAMIN D 25 HYDROXY: CPT

## 2021-09-25 PROCEDURE — 84443 ASSAY THYROID STIM HORMONE: CPT

## 2021-09-27 ENCOUNTER — OFFICE VISIT (OUTPATIENT)
Dept: FAMILY MEDICINE CLINIC | Facility: CLINIC | Age: 56
End: 2021-09-27
Payer: COMMERCIAL

## 2021-09-27 VITALS
HEART RATE: 66 BPM | WEIGHT: 169 LBS | SYSTOLIC BLOOD PRESSURE: 128 MMHG | DIASTOLIC BLOOD PRESSURE: 70 MMHG | BODY MASS INDEX: 24.2 KG/M2 | HEIGHT: 70 IN

## 2021-09-27 DIAGNOSIS — E78.6 LOW HDL (UNDER 40): ICD-10-CM

## 2021-09-27 DIAGNOSIS — C81.41: ICD-10-CM

## 2021-09-27 DIAGNOSIS — E55.9 VITAMIN D DEFICIENCY: ICD-10-CM

## 2021-09-27 DIAGNOSIS — R73.03 PREDIABETES: Primary | ICD-10-CM

## 2021-09-27 DIAGNOSIS — Z71.85 VACCINE COUNSELING: ICD-10-CM

## 2021-09-27 DIAGNOSIS — R31.29 MICROSCOPIC HEMATURIA: ICD-10-CM

## 2021-09-27 DIAGNOSIS — Z85.528 HISTORY OF RENAL CELL CARCINOMA: ICD-10-CM

## 2021-09-27 DIAGNOSIS — K76.0 FATTY LIVER: ICD-10-CM

## 2021-09-27 DIAGNOSIS — Z86.39 HISTORY OF IRON DEFICIENCY: ICD-10-CM

## 2021-09-27 DIAGNOSIS — Z79.899 MEDICATION MANAGEMENT: ICD-10-CM

## 2021-09-27 DIAGNOSIS — D56.3 ALPHA THALASSEMIA SILENT CARRIER: ICD-10-CM

## 2021-09-27 DIAGNOSIS — E78.1 HYPERTRIGLYCERIDEMIA: ICD-10-CM

## 2021-09-27 DIAGNOSIS — E78.5 DYSLIPIDEMIA: ICD-10-CM

## 2021-09-27 PROCEDURE — 3078F DIAST BP <80 MM HG: CPT | Performed by: FAMILY MEDICINE

## 2021-09-27 PROCEDURE — 90471 IMMUNIZATION ADMIN: CPT | Performed by: FAMILY MEDICINE

## 2021-09-27 PROCEDURE — 99214 OFFICE O/P EST MOD 30 MIN: CPT | Performed by: FAMILY MEDICINE

## 2021-09-27 PROCEDURE — 90750 HZV VACC RECOMBINANT IM: CPT | Performed by: FAMILY MEDICINE

## 2021-09-27 PROCEDURE — 3074F SYST BP LT 130 MM HG: CPT | Performed by: FAMILY MEDICINE

## 2021-09-27 PROCEDURE — 3008F BODY MASS INDEX DOCD: CPT | Performed by: FAMILY MEDICINE

## 2021-09-27 RX ORDER — ERGOCALCIFEROL 1.25 MG/1
50000 CAPSULE ORAL WEEKLY
COMMUNITY
Start: 2021-08-06 | End: 2021-09-27 | Stop reason: ALTCHOICE

## 2021-09-27 NOTE — PROGRESS NOTES
Curley Schirmer is a 49year old male. HPI:   Patient is here for medication visit. Started new job May 24, 2021. Saw Dr. Chris Figures -- 1 polyp.     Hodgkin's lymphoma–per Dr. Rodney Hinton and recently finished chemo and radiation; doing well    Walking a lot -- 5 miles a well otherwise  SKIN: denies any unusual skin lesions  EYES:denies blurred vision or double vision  HEENT: denies nasal congestion, sinus pain or ST  LUNGS: denies shortness of breath with exertion  CARDIOVASCULAR: denies chest pain on exertion  GI: denies stable  Microscopic hematuria  -- stable; CPM  Alpha thalassemia silent carrier  -- stable; CPM  Dyslipidemia -- advised ultrafast heart scan  Low hdl (under 40)  -- stable  Hypertriglyceridemia  --  niacin 1000 mg daily  OTC; doing better  Fatty liver --

## 2021-11-26 ENCOUNTER — TELEPHONE (OUTPATIENT)
Dept: HEMATOLOGY/ONCOLOGY | Facility: HOSPITAL | Age: 56
End: 2021-11-26

## 2021-11-26 NOTE — TELEPHONE ENCOUNTER
Patient called and needs a copy of his CT scan orders. He will be have his scans at [address] [place] [address] due to lower cost. Please call the patient to advise. Thank you.

## 2021-11-26 NOTE — TELEPHONE ENCOUNTER
I notified the patient that the CT scan can be done at [address] as his insurance requires. I faxed the orders to them at [phone]. Patient will have the testing prior to his office visit.

## 2021-11-29 ENCOUNTER — TELEPHONE (OUTPATIENT)
Dept: HEMATOLOGY/ONCOLOGY | Facility: HOSPITAL | Age: 56
End: 2021-11-29

## 2021-11-29 NOTE — TELEPHONE ENCOUNTER
Robert calling from [facility]. [phone] Requesting order for:    CT Abdomen and Pelvis  W/ contrast  CT neck  Soft tissue  W/ contrast  CT chest  W/ contrast    Be sent to [address] [place] [address]   Appointment has been made for 12/6  @ 10:30am...

## 2021-11-29 NOTE — TELEPHONE ENCOUNTER
This order was [facility]ed to [facility] on Friday at [phone], the fax number that I was given by the facility. I have also emailed the orders to the patient.

## 2021-12-01 ENCOUNTER — IMMUNIZATION (OUTPATIENT)
Dept: LAB | Facility: HOSPITAL | Age: 56
End: 2021-12-01
Attending: EMERGENCY MEDICINE
Payer: COMMERCIAL

## 2021-12-01 DIAGNOSIS — Z23 NEED FOR VACCINATION: Primary | ICD-10-CM

## 2021-12-01 PROCEDURE — 0064A SARSCOV2 VAC 50MCG/0.25ML IM: CPT

## 2021-12-09 ENCOUNTER — LAB ENCOUNTER (OUTPATIENT)
Dept: LAB | Age: 56
End: 2021-12-09
Attending: FAMILY MEDICINE
Payer: COMMERCIAL

## 2021-12-09 DIAGNOSIS — C81.41: ICD-10-CM

## 2021-12-09 PROCEDURE — 83615 LACTATE (LD) (LDH) ENZYME: CPT

## 2021-12-09 PROCEDURE — 36415 COLL VENOUS BLD VENIPUNCTURE: CPT

## 2021-12-09 PROCEDURE — 85025 COMPLETE CBC W/AUTO DIFF WBC: CPT

## 2021-12-09 PROCEDURE — 80053 COMPREHEN METABOLIC PANEL: CPT

## 2021-12-10 ENCOUNTER — OFFICE VISIT (OUTPATIENT)
Dept: SURGERY | Facility: CLINIC | Age: 56
End: 2021-12-10
Payer: COMMERCIAL

## 2021-12-10 DIAGNOSIS — R82.90 URINE FINDING: Primary | ICD-10-CM

## 2021-12-10 DIAGNOSIS — C64.2 RENAL CELL CANCER, LEFT (HCC): ICD-10-CM

## 2021-12-10 PROCEDURE — 99213 OFFICE O/P EST LOW 20 MIN: CPT | Performed by: UROLOGY

## 2021-12-10 PROCEDURE — 81003 URINALYSIS AUTO W/O SCOPE: CPT | Performed by: UROLOGY

## 2021-12-10 NOTE — PROGRESS NOTES
Rooming Clinician:     Bethanie Elliott is a 49year old male. Patient presents with:   Follow - Up: follow up from kidney cyst removal        HPI:     Patient has a history of a 1.8 cm left renal mass which was a clear-cell renal cell carcinoma and underwent rad exertion  GI: denies abdominal pain and denies heartburn  : see HPI  NEURO: no sensory or motor complaint    EXAM:     There were no vitals taken for this visit.   GENERAL: well developed, well nourished,in no apparent distress  SKIN: no rashes,no suspici and agrees to the plan. Dequan Caldwell M.D.   Urology

## 2021-12-13 ENCOUNTER — OFFICE VISIT (OUTPATIENT)
Dept: HEMATOLOGY/ONCOLOGY | Facility: HOSPITAL | Age: 56
End: 2021-12-13
Attending: INTERNAL MEDICINE
Payer: COMMERCIAL

## 2021-12-13 ENCOUNTER — TELEMEDICINE (OUTPATIENT)
Dept: SURGERY | Facility: CLINIC | Age: 56
End: 2021-12-13
Payer: COMMERCIAL

## 2021-12-13 VITALS
HEIGHT: 70.98 IN | RESPIRATION RATE: 16 BRPM | OXYGEN SATURATION: 99 % | DIASTOLIC BLOOD PRESSURE: 82 MMHG | HEART RATE: 87 BPM | WEIGHT: 175 LBS | BODY MASS INDEX: 24.5 KG/M2 | TEMPERATURE: 98 F | SYSTOLIC BLOOD PRESSURE: 139 MMHG

## 2021-12-13 DIAGNOSIS — C64.2 RENAL CELL CANCER, LEFT (HCC): Primary | ICD-10-CM

## 2021-12-13 DIAGNOSIS — C81.41: Primary | ICD-10-CM

## 2021-12-13 DIAGNOSIS — R59.0 LYMPHADENOPATHY, AXILLARY: ICD-10-CM

## 2021-12-13 PROCEDURE — 99214 OFFICE O/P EST MOD 30 MIN: CPT | Performed by: INTERNAL MEDICINE

## 2021-12-13 PROCEDURE — 99213 OFFICE O/P EST LOW 20 MIN: CPT | Performed by: UROLOGY

## 2021-12-13 NOTE — PROGRESS NOTES
Rooming Clinician:     Henrry Bar is a 49year old male. No chief complaint on file. HPI:     Mr Lai Natarajan  Requested a video visit to discuss the recent [facility].   I reviewed the findings of the CT scan and the [facility] PACS system wh denies heartburn  : see HPI  NEURO: no sensory or motor complaint    EXAM:     There were no vitals taken for this visit.   GENERAL: well developed, well nourished,in no apparent distress  SKIN: no rashes,no suspicious lesions  HEENT: atraumatic, normocep

## 2021-12-13 NOTE — PROGRESS NOTES
Patient is here for follow up for lymphoma. He had CT scans done at [address] and he brought the disc in last week to be scanned in. He reports that he is feeling well. He offers no complaints. He denies fever or night sweats.       Education

## 2021-12-14 DIAGNOSIS — R59.0 LYMPHADENOPATHY, AXILLARY: ICD-10-CM

## 2021-12-14 DIAGNOSIS — C81.41: Primary | ICD-10-CM

## 2021-12-14 NOTE — PROGRESS NOTES
Cancer Center Progress Note    Problem List:      Patient Active Problem List:     Acute sinusitis, unspecified     Acute bronchitis     Acute pharyngitis     Screening for thyroid disorder     Screening for other and unspecified endocrine, nutritional, me night sweats and weight loss. Eyes: Negative for visual disturbance, irritation and redness. Cardiovascular: Negative for angina, orthopnea or palpitations. Integument/breast: Negative for rash, skin lesions, and pruritus.   Hematologic/lymphatic: [employer] 1430)  Temp: 97.7 °F (36.5 °C) (12/13 1430)  Do Not Use - Resp Rate: --  SpO2: 99 % (12/13 1430)      Performance Status:  ECOG 0: Fully active, able to carry on all pre-disease performance without restriction     Physical Examination:    Constitutional: P axillary adenopathy.     AORTA:  No aneurysm or dissection.     VASCULATURE:  No visible pulmonary arterial thrombus or attenuation.         ABDOMEN/PELVIS:   LIVER:  Diffuse fatty infiltration of the liver.     BILIARY:  Cholelithiasis.  No biliary ductal within the SVC. Normal activity patterns are otherwise seen in the chest, abdomen, pelvis, head and neck. Fatty liver changes are seen    =====  CONCLUSION:       1.  Resolution of previously demonstrated abnormal activity in the left neck related to previ

## 2021-12-27 ENCOUNTER — TELEPHONE (OUTPATIENT)
Dept: HEMATOLOGY/ONCOLOGY | Facility: HOSPITAL | Age: 56
End: 2021-12-27

## 2021-12-27 NOTE — TELEPHONE ENCOUNTER
Spoke to patient about authorization on [facility] [employer]. I spoke with Rony Beach and she has the order, but it hasn't been approved yet.

## 2022-01-10 DIAGNOSIS — C81.41: Primary | ICD-10-CM

## 2022-01-10 DIAGNOSIS — R59.0 LYMPHADENOPATHY, AXILLARY: ICD-10-CM

## 2022-01-11 ENCOUNTER — TELEPHONE (OUTPATIENT)
Dept: HEMATOLOGY/ONCOLOGY | Facility: HOSPITAL | Age: 57
End: 2022-01-11

## 2022-01-13 ENCOUNTER — TELEPHONE (OUTPATIENT)
Dept: HEMATOLOGY/ONCOLOGY | Facility: HOSPITAL | Age: 57
End: 2022-01-13

## 2022-01-14 ENCOUNTER — TELEPHONE (OUTPATIENT)
Dept: HEMATOLOGY/ONCOLOGY | Facility: HOSPITAL | Age: 57
End: 2022-01-14

## 2022-01-14 ENCOUNTER — APPOINTMENT (OUTPATIENT)
Dept: HEMATOLOGY/ONCOLOGY | Age: 57
End: 2022-01-14
Attending: INTERNAL MEDICINE
Payer: COMMERCIAL

## 2022-01-14 NOTE — TELEPHONE ENCOUNTER
Labs faxed. Left message asking for patient to come 15 mins before appointment to get XR, explained that XR is here in ortho.

## 2022-01-14 NOTE — TELEPHONE ENCOUNTER
Crystal He from [facility] called and needs labs ONLY ORDERED BY DR. ARRINGTON to be faxed to [phone])/ Crystal He is specifically looking for BUN, CREACININE, liver function, [facility]'s Phone number: [phone]. Thank you.

## 2022-01-19 ENCOUNTER — OFFICE VISIT (OUTPATIENT)
Dept: HEMATOLOGY/ONCOLOGY | Facility: HOSPITAL | Age: 57
End: 2022-01-19
Attending: INTERNAL MEDICINE
Payer: COMMERCIAL

## 2022-01-19 VITALS
OXYGEN SATURATION: 100 % | TEMPERATURE: 98 F | HEIGHT: 70.98 IN | RESPIRATION RATE: 16 BRPM | SYSTOLIC BLOOD PRESSURE: 152 MMHG | DIASTOLIC BLOOD PRESSURE: 87 MMHG | HEART RATE: 68 BPM | BODY MASS INDEX: 24.55 KG/M2 | WEIGHT: 175.38 LBS

## 2022-01-19 DIAGNOSIS — R59.0 LYMPHADENOPATHY, AXILLARY: ICD-10-CM

## 2022-01-19 DIAGNOSIS — C81.41: Primary | ICD-10-CM

## 2022-01-19 PROCEDURE — 99212 OFFICE O/P EST SF 10 MIN: CPT | Performed by: INTERNAL MEDICINE

## 2022-01-19 NOTE — PROGRESS NOTES
Patient is here for follow up after having CT scan even though his insurance denied it. He brought a disc of the images. He sent the report yesterday for Dr. Christine Weinstein to review. He reports that he is feeling well. He denies any fever or night sweats.      Nolan Enriquez

## 2022-01-19 NOTE — PROGRESS NOTES
Cancer Center Progress Note    Problem List:      Patient Active Problem List:     Acute sinusitis, unspecified     Acute bronchitis     Acute pharyngitis     Screening for thyroid disorder     Screening for other and unspecified endocrine, nutritional, me chills, night sweats and weight loss. Eyes: Negative for visual disturbance, irritation and redness. Cardiovascular: Negative for angina, orthopnea or palpitations. Integument/breast: Negative for rash, skin lesions, and pruritus.   Hematologic/lymphatic (01/19 0955)  Temp: 97.7 °F (36.5 °C) (01/19 0955)  Do Not Use - Resp Rate: --  SpO2: 100 % (01/19 0955)      Performance Status:  ECOG 0: Fully active, able to carry on all pre-disease performance without restriction     Physical Examination:    Constitut enlarged axillary adenopathy.     AORTA:  No aneurysm or dissection.     VASCULATURE:  No visible pulmonary arterial thrombus or attenuation.         ABDOMEN/PELVIS:   LIVER:  Diffuse fatty infiltration of the liver.     BILIARY:  Cholelithiasis.  No bilia catheter within the SVC. Normal activity patterns are otherwise seen in the chest, abdomen, pelvis, head and neck. Fatty liver changes are seen    =====  CONCLUSION:       1.  Resolution of previously demonstrated abnormal activity in the left neck related

## 2022-03-17 NOTE — LETTER
[facility] [address], [address] [place] [address]    Consent for Anesthesia   1.   IAmarilis agree to be cared for by a physician anesthesiologist alone and/or with a nurse anesthetist, who is specially trained to monitor me and give me medici reactions to medications, injury to my airway, heart, lungs, vision, nerves, or muscles and in extremely rare instances death. 5. My doctor has explained to me other choices available to me for my care (alternatives).   6. Pregnant Patients (“epidural”): Medical Record #: [id number]                                            Page 1 of 1 Post-Care Instructions: Patient instructed to not lie down for 4 hours and limit physical activity for 24 hours.

## 2022-06-07 ENCOUNTER — TELEPHONE (OUTPATIENT)
Dept: SURGERY | Facility: CLINIC | Age: 57
End: 2022-06-07

## 2022-06-07 NOTE — TELEPHONE ENCOUNTER
RN faxed the requisition to [facility] today, 6/7 at [phone]. RN called patient and updated. He agreed to plans and thankful.

## 2022-06-07 NOTE — TELEPHONE ENCOUNTER
Per pt asking to send ct order to [address].  Please advise    Ph: [phone]

## 2022-07-20 ENCOUNTER — OFFICE VISIT (OUTPATIENT)
Dept: HEMATOLOGY/ONCOLOGY | Facility: HOSPITAL | Age: 57
End: 2022-07-20
Attending: INTERNAL MEDICINE
Payer: COMMERCIAL

## 2022-07-20 VITALS
SYSTOLIC BLOOD PRESSURE: 131 MMHG | TEMPERATURE: 98 F | OXYGEN SATURATION: 100 % | HEART RATE: 80 BPM | WEIGHT: 172 LBS | BODY MASS INDEX: 24 KG/M2 | RESPIRATION RATE: 16 BRPM | DIASTOLIC BLOOD PRESSURE: 81 MMHG

## 2022-07-20 DIAGNOSIS — R59.0 LYMPHADENOPATHY, AXILLARY: ICD-10-CM

## 2022-07-20 DIAGNOSIS — C81.41: Primary | ICD-10-CM

## 2022-07-20 DIAGNOSIS — N28.89 LEFT KIDNEY MASS: ICD-10-CM

## 2022-07-20 LAB
ALBUMIN SERPL-MCNC: 4.2 G/DL (ref 3.4–5)
ALBUMIN/GLOB SERPL: 1.2 {RATIO} (ref 1–2)
ALP LIVER SERPL-CCNC: 58 U/L
ALT SERPL-CCNC: 58 U/L
ANION GAP SERPL CALC-SCNC: 2 MMOL/L (ref 0–18)
AST SERPL-CCNC: 43 U/L (ref 15–37)
BASOPHILS # BLD AUTO: 0.05 X10(3) UL (ref 0–0.2)
BASOPHILS NFR BLD AUTO: 0.7 %
BILIRUB SERPL-MCNC: 0.4 MG/DL (ref 0.1–2)
BUN BLD-MCNC: 10 MG/DL (ref 7–18)
CALCIUM BLD-MCNC: 9.2 MG/DL (ref 8.5–10.1)
CHLORIDE SERPL-SCNC: 109 MMOL/L (ref 98–112)
CO2 SERPL-SCNC: 27 MMOL/L (ref 21–32)
CREAT BLD-MCNC: 1.14 MG/DL
EOSINOPHIL # BLD AUTO: 0.14 X10(3) UL (ref 0–0.7)
EOSINOPHIL NFR BLD AUTO: 2.1 %
ERYTHROCYTE [DISTWIDTH] IN BLOOD BY AUTOMATED COUNT: 13 %
FASTING STATUS PATIENT QL REPORTED: NO
GLOBULIN PLAS-MCNC: 3.6 G/DL (ref 2.8–4.4)
GLUCOSE BLD-MCNC: 107 MG/DL (ref 70–99)
HCT VFR BLD AUTO: 46.1 %
HGB BLD-MCNC: 15.1 G/DL
IMM GRANULOCYTES # BLD AUTO: 0.03 X10(3) UL (ref 0–1)
IMM GRANULOCYTES NFR BLD: 0.4 %
LDH SERPL L TO P-CCNC: 193 U/L
LYMPHOCYTES # BLD AUTO: 3 X10(3) UL (ref 1–4)
LYMPHOCYTES NFR BLD AUTO: 44.6 %
MCH RBC QN AUTO: 26.9 PG (ref 26–34)
MCHC RBC AUTO-ENTMCNC: 32.8 G/DL (ref 31–37)
MCV RBC AUTO: 82.2 FL
MONOCYTES # BLD AUTO: 0.5 X10(3) UL (ref 0.1–1)
MONOCYTES NFR BLD AUTO: 7.4 %
NEUTROPHILS # BLD AUTO: 3 X10 (3) UL (ref 1.5–7.7)
NEUTROPHILS # BLD AUTO: 3 X10(3) UL (ref 1.5–7.7)
NEUTROPHILS NFR BLD AUTO: 44.8 %
OSMOLALITY SERPL CALC.SUM OF ELEC: 286 MOSM/KG (ref 275–295)
PLATELET # BLD AUTO: 279 10(3)UL (ref 150–450)
POTASSIUM SERPL-SCNC: 4.1 MMOL/L (ref 3.5–5.1)
PROT SERPL-MCNC: 7.8 G/DL (ref 6.4–8.2)
RBC # BLD AUTO: 5.61 X10(6)UL
SODIUM SERPL-SCNC: 138 MMOL/L (ref 136–145)
WBC # BLD AUTO: 6.7 X10(3) UL (ref 4–11)

## 2022-07-20 PROCEDURE — 99214 OFFICE O/P EST MOD 30 MIN: CPT | Performed by: INTERNAL MEDICINE

## 2022-07-20 NOTE — PROGRESS NOTES
Patient is here for follow up for hodgkin's lymphoma. He had CT of chest done at [facility]. He has provided a copy of the report and also dropped off discs for comparison to radiology yesterday. He is feeling well. He offers no complaints. He denies fever or sweats. His appetite and energy are good.      Education Record    Learner:  Patient    Disease / Diagnosis: hodgkin's lymphoma    Barriers / Limitations:  None   Comments:    Method:  Discussion   Comments:    General Topics:  Side effects and symptom management   Comments:    Outcome:  Shows understanding   Comments:

## 2022-07-26 ENCOUNTER — OFFICE VISIT (OUTPATIENT)
Dept: SURGERY | Facility: CLINIC | Age: 57
End: 2022-07-26
Payer: COMMERCIAL

## 2022-07-26 DIAGNOSIS — R82.90 URINE FINDING: Primary | ICD-10-CM

## 2022-07-26 DIAGNOSIS — Z85.528 HISTORY OF RENAL CELL CARCINOMA: ICD-10-CM

## 2022-07-26 LAB
BILIRUBIN: NEGATIVE
GLUCOSE (URINE DIPSTICK): NEGATIVE MG/DL
KETONES (URINE DIPSTICK): NEGATIVE MG/DL
LEUKOCYTES: NEGATIVE
MULTISTIX LOT#: NORMAL NUMERIC
NITRITE, URINE: NEGATIVE
OCCULT BLOOD: NEGATIVE
PH, URINE: 7.5 (ref 4.5–8)
PROTEIN (URINE DIPSTICK): NEGATIVE MG/DL
SPECIFIC GRAVITY: 1.02 (ref 1–1.03)
URINE-COLOR: YELLOW
UROBILINOGEN,SEMI-QN: 0.2 MG/DL (ref 0–1.9)

## 2022-07-26 PROCEDURE — 99213 OFFICE O/P EST LOW 20 MIN: CPT | Performed by: UROLOGY

## 2022-07-26 PROCEDURE — 81003 URINALYSIS AUTO W/O SCOPE: CPT | Performed by: UROLOGY

## 2022-12-24 ENCOUNTER — OFFICE VISIT (OUTPATIENT)
Dept: FAMILY MEDICINE CLINIC | Facility: CLINIC | Age: 57
End: 2022-12-24
Payer: COMMERCIAL

## 2022-12-24 VITALS
SYSTOLIC BLOOD PRESSURE: 128 MMHG | OXYGEN SATURATION: 95 % | TEMPERATURE: 97 F | DIASTOLIC BLOOD PRESSURE: 80 MMHG | WEIGHT: 172 LBS | RESPIRATION RATE: 18 BRPM | BODY MASS INDEX: 24.62 KG/M2 | HEIGHT: 70 IN | HEART RATE: 74 BPM

## 2022-12-24 DIAGNOSIS — J01.00 ACUTE MAXILLARY SINUSITIS, RECURRENCE NOT SPECIFIED: Primary | ICD-10-CM

## 2022-12-24 RX ORDER — AMOXICILLIN AND CLAVULANATE POTASSIUM 875; 125 MG/1; MG/1
1 TABLET, FILM COATED ORAL 2 TIMES DAILY
Qty: 20 TABLET | Refills: 0 | Status: SHIPPED | OUTPATIENT
Start: 2022-12-24 | End: 2023-01-03

## 2023-01-06 ENCOUNTER — OFFICE VISIT (OUTPATIENT)
Dept: FAMILY MEDICINE CLINIC | Facility: CLINIC | Age: 58
End: 2023-01-06
Payer: COMMERCIAL

## 2023-01-06 VITALS
RESPIRATION RATE: 16 BRPM | BODY MASS INDEX: 25.2 KG/M2 | WEIGHT: 176 LBS | HEIGHT: 70 IN | SYSTOLIC BLOOD PRESSURE: 108 MMHG | HEART RATE: 66 BPM | DIASTOLIC BLOOD PRESSURE: 70 MMHG | OXYGEN SATURATION: 97 %

## 2023-01-06 DIAGNOSIS — Z13.0 SCREENING FOR ENDOCRINE, METABOLIC AND IMMUNITY DISORDER: ICD-10-CM

## 2023-01-06 DIAGNOSIS — E78.6 LOW HDL (UNDER 40): ICD-10-CM

## 2023-01-06 DIAGNOSIS — E55.9 VITAMIN D DEFICIENCY: ICD-10-CM

## 2023-01-06 DIAGNOSIS — Z13.228 SCREENING FOR ENDOCRINE, METABOLIC AND IMMUNITY DISORDER: ICD-10-CM

## 2023-01-06 DIAGNOSIS — Z00.00 ROUTINE GENERAL MEDICAL EXAMINATION AT A HEALTH CARE FACILITY: Primary | ICD-10-CM

## 2023-01-06 DIAGNOSIS — Z86.39 HISTORY OF IRON DEFICIENCY: ICD-10-CM

## 2023-01-06 DIAGNOSIS — Z00.00 LABORATORY EXAMINATION ORDERED AS PART OF A ROUTINE GENERAL MEDICAL EXAMINATION: ICD-10-CM

## 2023-01-06 DIAGNOSIS — Z11.59 ENCOUNTER FOR HEPATITIS C SCREENING TEST FOR LOW RISK PATIENT: ICD-10-CM

## 2023-01-06 DIAGNOSIS — E78.1 HYPERTRIGLYCERIDEMIA: ICD-10-CM

## 2023-01-06 DIAGNOSIS — Z71.85 VACCINE COUNSELING: ICD-10-CM

## 2023-01-06 DIAGNOSIS — R73.03 PREDIABETES: ICD-10-CM

## 2023-01-06 DIAGNOSIS — Z13.29 SCREENING FOR ENDOCRINE, METABOLIC AND IMMUNITY DISORDER: ICD-10-CM

## 2023-01-06 DIAGNOSIS — Z23 NEED FOR VACCINATION: ICD-10-CM

## 2023-01-06 DIAGNOSIS — E78.5 DYSLIPIDEMIA: ICD-10-CM

## 2023-01-06 DIAGNOSIS — Z13.89 SCREENING FOR GENITOURINARY CONDITION: ICD-10-CM

## 2023-01-06 PROCEDURE — 90471 IMMUNIZATION ADMIN: CPT | Performed by: FAMILY MEDICINE

## 2023-01-06 PROCEDURE — 90472 IMMUNIZATION ADMIN EACH ADD: CPT | Performed by: FAMILY MEDICINE

## 2023-01-06 PROCEDURE — 90746 HEPB VACCINE 3 DOSE ADULT IM: CPT | Performed by: FAMILY MEDICINE

## 2023-01-06 PROCEDURE — 3008F BODY MASS INDEX DOCD: CPT | Performed by: FAMILY MEDICINE

## 2023-01-06 PROCEDURE — 3074F SYST BP LT 130 MM HG: CPT | Performed by: FAMILY MEDICINE

## 2023-01-06 PROCEDURE — 90686 IIV4 VACC NO PRSV 0.5 ML IM: CPT | Performed by: FAMILY MEDICINE

## 2023-01-06 PROCEDURE — 99396 PREV VISIT EST AGE 40-64: CPT | Performed by: FAMILY MEDICINE

## 2023-01-06 PROCEDURE — 90677 PCV20 VACCINE IM: CPT | Performed by: FAMILY MEDICINE

## 2023-01-06 PROCEDURE — 3078F DIAST BP <80 MM HG: CPT | Performed by: FAMILY MEDICINE

## 2023-01-06 RX ORDER — NIACIN 100 MG
100 TABLET ORAL NIGHTLY
COMMUNITY

## 2023-01-06 RX ORDER — MULTIVIT-MIN/IRON/FOLIC ACID/K 18-600-40
CAPSULE ORAL
COMMUNITY

## 2023-01-09 ENCOUNTER — LAB ENCOUNTER (OUTPATIENT)
Dept: LAB | Age: 58
End: 2023-01-09
Attending: FAMILY MEDICINE
Payer: COMMERCIAL

## 2023-01-09 DIAGNOSIS — Z13.89 SCREENING FOR GENITOURINARY CONDITION: ICD-10-CM

## 2023-01-09 DIAGNOSIS — E78.6 LOW HDL (UNDER 40): ICD-10-CM

## 2023-01-09 DIAGNOSIS — Z13.0 SCREENING FOR ENDOCRINE, METABOLIC AND IMMUNITY DISORDER: ICD-10-CM

## 2023-01-09 DIAGNOSIS — Z13.29 SCREENING FOR ENDOCRINE, METABOLIC AND IMMUNITY DISORDER: ICD-10-CM

## 2023-01-09 DIAGNOSIS — E78.5 DYSLIPIDEMIA: ICD-10-CM

## 2023-01-09 DIAGNOSIS — E78.1 HYPERTRIGLYCERIDEMIA: ICD-10-CM

## 2023-01-09 DIAGNOSIS — Z00.00 LABORATORY EXAMINATION ORDERED AS PART OF A ROUTINE GENERAL MEDICAL EXAMINATION: ICD-10-CM

## 2023-01-09 DIAGNOSIS — E55.9 VITAMIN D DEFICIENCY: ICD-10-CM

## 2023-01-09 DIAGNOSIS — R59.0 LYMPHADENOPATHY, AXILLARY: ICD-10-CM

## 2023-01-09 DIAGNOSIS — N28.89 LEFT KIDNEY MASS: ICD-10-CM

## 2023-01-09 DIAGNOSIS — R73.03 PREDIABETES: ICD-10-CM

## 2023-01-09 DIAGNOSIS — Z11.59 ENCOUNTER FOR HEPATITIS C SCREENING TEST FOR LOW RISK PATIENT: ICD-10-CM

## 2023-01-09 DIAGNOSIS — C81.41: ICD-10-CM

## 2023-01-09 DIAGNOSIS — Z13.228 SCREENING FOR ENDOCRINE, METABOLIC AND IMMUNITY DISORDER: ICD-10-CM

## 2023-01-09 LAB
ALBUMIN SERPL-MCNC: 4.1 G/DL (ref 3.4–5)
ALBUMIN/GLOB SERPL: 1.2 {RATIO} (ref 1–2)
ALP LIVER SERPL-CCNC: 58 U/L
ALT SERPL-CCNC: 58 U/L
ANION GAP SERPL CALC-SCNC: 2 MMOL/L (ref 0–18)
AST SERPL-CCNC: 28 U/L (ref 15–37)
BASOPHILS # BLD AUTO: 0.08 X10(3) UL (ref 0–0.2)
BASOPHILS NFR BLD AUTO: 1 %
BILIRUB SERPL-MCNC: 0.6 MG/DL (ref 0.1–2)
BILIRUB UR QL STRIP.AUTO: NEGATIVE
BUN BLD-MCNC: 9 MG/DL (ref 7–18)
CALCIUM BLD-MCNC: 9 MG/DL (ref 8.5–10.1)
CHLORIDE SERPL-SCNC: 108 MMOL/L (ref 98–112)
CHOLEST SERPL-MCNC: 212 MG/DL (ref ?–200)
CLARITY UR REFRACT.AUTO: CLEAR
CO2 SERPL-SCNC: 28 MMOL/L (ref 21–32)
COLOR UR AUTO: YELLOW
CREAT BLD-MCNC: 1.13 MG/DL
CREAT UR-SCNC: 254 MG/DL
EOSINOPHIL # BLD AUTO: 0.3 X10(3) UL (ref 0–0.7)
EOSINOPHIL NFR BLD AUTO: 3.8 %
ERYTHROCYTE [DISTWIDTH] IN BLOOD BY AUTOMATED COUNT: 13.2 %
EST. AVERAGE GLUCOSE BLD GHB EST-MCNC: 128 MG/DL (ref 68–126)
FASTING PATIENT LIPID ANSWER: YES
FASTING STATUS PATIENT QL REPORTED: YES
GFR SERPLBLD BASED ON 1.73 SQ M-ARVRAT: 77 ML/MIN/1.73M2 (ref 60–?)
GLOBULIN PLAS-MCNC: 3.4 G/DL (ref 2.8–4.4)
GLUCOSE BLD-MCNC: 122 MG/DL (ref 70–99)
GLUCOSE UR STRIP.AUTO-MCNC: NEGATIVE MG/DL
HBA1C MFR BLD: 6.1 % (ref ?–5.7)
HCT VFR BLD AUTO: 46.5 %
HCV AB SERPL QL IA: NONREACTIVE
HDLC SERPL-MCNC: 32 MG/DL (ref 40–59)
HGB BLD-MCNC: 15.1 G/DL
IMM GRANULOCYTES # BLD AUTO: 0.05 X10(3) UL (ref 0–1)
IMM GRANULOCYTES NFR BLD: 0.6 %
KETONES UR STRIP.AUTO-MCNC: NEGATIVE MG/DL
LDH SERPL L TO P-CCNC: 167 U/L
LDLC SERPL CALC-MCNC: 126 MG/DL (ref ?–100)
LEUKOCYTE ESTERASE UR QL STRIP.AUTO: NEGATIVE
LYMPHOCYTES # BLD AUTO: 3.15 X10(3) UL (ref 1–4)
LYMPHOCYTES NFR BLD AUTO: 39.4 %
MCH RBC QN AUTO: 26.6 PG (ref 26–34)
MCHC RBC AUTO-ENTMCNC: 32.5 G/DL (ref 31–37)
MCV RBC AUTO: 82 FL
MICROALBUMIN UR-MCNC: 3.8 MG/DL
MICROALBUMIN/CREAT 24H UR-RTO: 15 UG/MG (ref ?–30)
MONOCYTES # BLD AUTO: 0.68 X10(3) UL (ref 0.1–1)
MONOCYTES NFR BLD AUTO: 8.5 %
NEUTROPHILS # BLD AUTO: 3.73 X10 (3) UL (ref 1.5–7.7)
NEUTROPHILS # BLD AUTO: 3.73 X10(3) UL (ref 1.5–7.7)
NEUTROPHILS NFR BLD AUTO: 46.7 %
NITRITE UR QL STRIP.AUTO: NEGATIVE
NONHDLC SERPL-MCNC: 180 MG/DL (ref ?–130)
OSMOLALITY SERPL CALC.SUM OF ELEC: 286 MOSM/KG (ref 275–295)
PH UR STRIP.AUTO: 5 [PH] (ref 5–8)
PLATELET # BLD AUTO: 311 10(3)UL (ref 150–450)
POTASSIUM SERPL-SCNC: 3.7 MMOL/L (ref 3.5–5.1)
PROT SERPL-MCNC: 7.5 G/DL (ref 6.4–8.2)
PROT UR STRIP.AUTO-MCNC: NEGATIVE MG/DL
PSA SERPL-MCNC: 3.68 NG/ML (ref ?–4)
RBC # BLD AUTO: 5.67 X10(6)UL
RUBV IGG SER QL: POSITIVE
RUBV IGG SER-ACNC: >500 IU/ML (ref 10–?)
SODIUM SERPL-SCNC: 138 MMOL/L (ref 136–145)
SP GR UR STRIP.AUTO: 1.02 (ref 1–1.03)
T4 FREE SERPL-MCNC: 1 NG/DL (ref 0.8–1.7)
TRIGL SERPL-MCNC: 301 MG/DL (ref 30–149)
TSI SER-ACNC: 4.88 MIU/ML (ref 0.36–3.74)
UROBILINOGEN UR STRIP.AUTO-MCNC: <2 MG/DL
VIT D+METAB SERPL-MCNC: 22.7 NG/ML (ref 30–100)
VLDLC SERPL CALC-MCNC: 55 MG/DL (ref 0–30)
WBC # BLD AUTO: 8 X10(3) UL (ref 4–11)

## 2023-01-09 PROCEDURE — 36415 COLL VENOUS BLD VENIPUNCTURE: CPT

## 2023-01-09 PROCEDURE — 82306 VITAMIN D 25 HYDROXY: CPT

## 2023-01-09 PROCEDURE — 86735 MUMPS ANTIBODY: CPT

## 2023-01-09 PROCEDURE — 82570 ASSAY OF URINE CREATININE: CPT

## 2023-01-09 PROCEDURE — 84443 ASSAY THYROID STIM HORMONE: CPT

## 2023-01-09 PROCEDURE — 84153 ASSAY OF PSA TOTAL: CPT

## 2023-01-09 PROCEDURE — 86765 RUBEOLA ANTIBODY: CPT

## 2023-01-09 PROCEDURE — 86762 RUBELLA ANTIBODY: CPT

## 2023-01-09 PROCEDURE — 84439 ASSAY OF FREE THYROXINE: CPT

## 2023-01-09 PROCEDURE — 81001 URINALYSIS AUTO W/SCOPE: CPT

## 2023-01-09 PROCEDURE — 83615 LACTATE (LD) (LDH) ENZYME: CPT

## 2023-01-09 PROCEDURE — 86803 HEPATITIS C AB TEST: CPT

## 2023-01-09 PROCEDURE — 82043 UR ALBUMIN QUANTITATIVE: CPT

## 2023-01-09 PROCEDURE — 85025 COMPLETE CBC W/AUTO DIFF WBC: CPT

## 2023-01-09 PROCEDURE — 80061 LIPID PANEL: CPT

## 2023-01-09 PROCEDURE — 83036 HEMOGLOBIN GLYCOSYLATED A1C: CPT

## 2023-01-09 PROCEDURE — 80053 COMPREHEN METABOLIC PANEL: CPT

## 2023-01-11 LAB
MEV IGG SER-ACNC: >300 AU/ML (ref 16.5–?)
MUV IGG SER IA-ACNC: 145 AU/ML (ref 11–?)

## 2023-01-12 DIAGNOSIS — R94.6 ABNORMAL THYROID FUNCTION TEST: Primary | ICD-10-CM

## 2023-01-13 DIAGNOSIS — E78.1 HIGH TRIGLYCERIDES: ICD-10-CM

## 2023-01-13 DIAGNOSIS — R73.03 PREDIABETES: ICD-10-CM

## 2023-01-13 DIAGNOSIS — E55.9 VITAMIN D DEFICIENCY: Primary | ICD-10-CM

## 2023-01-27 ENCOUNTER — OFFICE VISIT (OUTPATIENT)
Dept: HEMATOLOGY/ONCOLOGY | Age: 58
End: 2023-01-27
Attending: INTERNAL MEDICINE
Payer: COMMERCIAL

## 2023-01-27 VITALS
BODY MASS INDEX: 25 KG/M2 | OXYGEN SATURATION: 100 % | SYSTOLIC BLOOD PRESSURE: 162 MMHG | HEART RATE: 86 BPM | WEIGHT: 171.81 LBS | DIASTOLIC BLOOD PRESSURE: 82 MMHG | TEMPERATURE: 97 F

## 2023-01-27 DIAGNOSIS — C81.41: Primary | ICD-10-CM

## 2023-01-27 PROCEDURE — 99213 OFFICE O/P EST LOW 20 MIN: CPT | Performed by: INTERNAL MEDICINE

## 2023-01-27 NOTE — PROGRESS NOTES
Patient is here for follow up for lymphoma. He denies any fever or night sweats. He has had a persistent cough and has been treated with antibiotics. This seems to be finally improving. He reports that his energy is good. He had an elevated glucose so he is monitoring his diet closely. He had labs done on 1/9/2023.      Education Record    Learner:  Patient    Disease / Diagnosis: lymphoma    Barriers / Limitations:  None   Comments:    Method:  Discussion   Comments:    General Topics:  Side effects and symptom management   Comments:    Outcome:  Shows understanding   Comments:

## 2023-02-18 ENCOUNTER — IMMUNIZATION (OUTPATIENT)
Dept: LAB | Age: 58
End: 2023-02-18
Attending: EMERGENCY MEDICINE
Payer: COMMERCIAL

## 2023-02-18 DIAGNOSIS — Z23 NEED FOR VACCINATION: Primary | ICD-10-CM

## 2023-02-18 PROCEDURE — 0134A SARSCOV2 VAC BVL 50MCG/0.5ML: CPT

## 2023-06-01 ENCOUNTER — PATIENT MESSAGE (OUTPATIENT)
Dept: FAMILY MEDICINE CLINIC | Facility: CLINIC | Age: 58
End: 2023-06-01

## 2023-06-01 NOTE — TELEPHONE ENCOUNTER
From: Kayli Camacho  To: Georgette Pozo DO  Sent: 6/1/2023 6:32 AM CDT  Subject: New Patient Request    Hi Dr Caitlin Babcock. Hope this finds you well. I have brought my mother to live along with me after my father passed away. She is a permanent resident now and has [employer]. I would like to know if you would be able to see her as a PCP. She is [de-identified]years old. Not sure if you can accommodate her with an early appointment as we have not seen a doctor in 1 year. Wanted to send a note to see if you can help and I will call scheduling. We can come any day anytime.   Thanks  Nelly Reid  [phone]

## 2023-06-01 NOTE — TELEPHONE ENCOUNTER
Dr. Mayi Coates,    [address] [place] [address] 1-6-23  NOV not scheduled yet  Please see [employer] message. Thank you.

## 2023-06-02 NOTE — TELEPHONE ENCOUNTER
Joshua Beaulieu called and talked to Geeta Bishop about having Dr. George Rodriguez name added to her [facility] insurance as PCP and to call for an appt. And state that her son is a patient of Dr. John Serrato.

## 2024-05-13 ENCOUNTER — OFFICE VISIT (OUTPATIENT)
Dept: FAMILY MEDICINE CLINIC | Facility: CLINIC | Age: 59
End: 2024-05-13
Payer: COMMERCIAL

## 2024-05-13 VITALS
BODY MASS INDEX: 24.08 KG/M2 | TEMPERATURE: 101 F | HEIGHT: 71 IN | OXYGEN SATURATION: 97 % | DIASTOLIC BLOOD PRESSURE: 80 MMHG | HEART RATE: 108 BPM | RESPIRATION RATE: 20 BRPM | WEIGHT: 172 LBS | SYSTOLIC BLOOD PRESSURE: 136 MMHG

## 2024-05-13 DIAGNOSIS — B34.9 ACUTE VIRAL SYNDROME: ICD-10-CM

## 2024-05-13 DIAGNOSIS — R50.9 FEVER, UNSPECIFIED FEVER CAUSE: Primary | ICD-10-CM

## 2024-05-13 PROCEDURE — 87637 SARSCOV2&INF A&B&RSV AMP PRB: CPT | Performed by: PHYSICIAN ASSISTANT

## 2024-05-13 NOTE — PROGRESS NOTES
CHIEF COMPLAINT:     Chief Complaint   Patient presents with    Fever     Friday, 102 temp, body aches, headaches, chills  OTC motrin, theraflu     HPI:   Patel Montalvo is a 58 year old male who presents to [facility] for COVID-19 testing with symptoms/history as described below:  Onset: Friday evening, 3 nights ago   Exposure to COVID:   no known exposure    Fever:     Yes [x]     No []     temp this morning 102.5F  Cough:    Yes []     No [x]       Dry []     Productive []   Congestion: Yes []     No [x]      Rhinorrhea: Yes []     No [x]      Loss of Smell/Taste: Yes []     No [x]        Fatigue:   Yes [x]     No []     Myalgias: Yes [x]     No []   Chills:       Yes [x]    No []       Headache:     Yes [x]     No []       Sore throat:   Yes []      No [x]      Ear Pain:  Yes []      No [x]        Shortness of breath:  Yes []   No [x]     Wheezing:   Yes []   No [x]     Chest pain/pressure:     Yes []     No [x]        GI symptoms: Yes []     No [x]                     Nausea  []; Vomiting  []; Diarrhea  [] ; Upset stomach [];   Abdominal Pain  []          Additional symptoms: none      Symptoms have been persisting since onset.  Treating symptoms with Motrin, Theraflu.      Pt  denies other exposure to illness such as strep or flu.   Any recent travel: No    Other factors/medical conditions that may impact condition: no      Current Outpatient Medications   Medication Sig Dispense Refill    niacin 100 MG Oral Tab Take 100 mg by mouth nightly.      Cholecalciferol (VITAMIN D) 50 MCG (2000 UT) Oral Cap Take by mouth.        Past Medical History:    Back problem    Cancer (HCC)    lymphoma    Exposure to medical diagnostic radiation    10/2020    Hyperlipidemia    Personal history of antineoplastic chemotherapy    10/2020    Visual impairment    glasses    Vitamin D deficiency      Past Surgical History:   Procedure Laterality Date    Appendectomy  age 19    Biopsy Left 06/2020    left neck LN biopsy    Colonoscopy  05/2021     Other Right     portacath placement    Removal of tonsils,14+ y/o N/A 5/27/2016    Procedure: TONSILLECTOMY;  Surgeon: Sreekanth Daniels MD;  Location: [facility]         Social History     Socioeconomic History    Marital status:     Number of children: 2   Occupational History    Occupation:    Tobacco Use    Smoking status: Never    Smokeless tobacco: Never   Vaping Use    Vaping status: Never Used   Substance and Sexual Activity    Alcohol use: Not Currently    Drug use: No    Sexual activity: Yes     Partners: Female   Other Topics Concern    Caffeine Concern Yes     Comment: 2 cups of tea dly    Exercise Yes     Comment: 2 x week   Social History Narrative    , lives at home with wife    Has 2 daughters (19 and 17) - one at [employer], 1 is a senior in     On disability at this time         REVIEW OF SYSTEMS:   GENERAL: decreased appetite, drinking fluids  SKIN: Denies rash or other lesions  HEENT: See HPI  LUNGS: See HPI  CARDIOVASCULAR: denies palpitations; see HPI  GI: see HPI  NEURO: Denies dizziness; see HPI    EXAM:   /80   Pulse 108   Temp (!) 100.5 °F (38.1 °C) (Temporal)   Resp 20   Ht 5' 11\" (1.803 m)   Wt 172 lb (78 kg)   SpO2 97%   BMI 23.99 kg/m²   GENERAL: Non-toxic, well nourished, in no apparent distress  SKIN: no rashes,no suspicious lesions  HEAD: atraumatic, normocephalic.    EYES: conjunctiva clear  EARS: TM's pearly, no bulging, no retraction, no  fluid, bony landmarks sharp bilaterally  NOSE: Nostrils patent, no nasal discharge, nasal mucosa non inflamed   THROAT: Oral mucosa pink, moist. Posterior pharynx is not erythematous. Tonsils surgically absent. Uvula midline   NECK: Supple, non-tender  LUNGS: clear to auscultation bilaterally, no wheezes, crackles or rhonchi. No decreased BS. Breathing is non labored. Speaking in full sentences without hesitation.  Cough- no cough during exam  CARDIO: RRR without murmur  LYMPH:  no  lymphadenopathy.    ASSESSMENT AND PLAN:   Patel Montalvo is a 58 year old male who presents with upper respiratory symptoms that are consistent with    ASSESSMENT:   Encounter Diagnoses   Name Primary?    Fever, unspecified fever cause Yes    Acute viral syndrome        Meds & Refills for this Visit:  Requested Prescriptions      No prescriptions requested or ordered in this encounter       PLAN:  Exam unremarkable. Lung CTA. No bacterial focus. Likely viral etiology at this time.   - Viral panel testing ordered.  Discussed turnaround time for results with patient.  - Supportive care as described in Patient Instructions and as outlined below.  - to follow up with PCP or IC for reevaluation for persistent symptoms.    - Discussed s/s of worsening infection/condition with Patient and importance of prompt medical re-evaluation including when to seek emergency care. Patient voiced understanding      Supportive care:  Increase fluids and rest.   May consider OTC tylenol or ibuprofen as needed and directed on packaging     All questions were addressed and answered.     Risks, benefits, and side effects of medication discussed. Patient voiced understanding and agrees with today's plan.        There are no Patient Instructions on file for this visit.

## 2024-05-14 ENCOUNTER — PATIENT MESSAGE (OUTPATIENT)
Dept: FAMILY MEDICINE CLINIC | Facility: CLINIC | Age: 59
End: 2024-05-14

## 2024-05-14 LAB
FLUAV + FLUBV RNA SPEC NAA+PROBE: NOT DETECTED
FLUAV + FLUBV RNA SPEC NAA+PROBE: NOT DETECTED
RSV RNA SPEC NAA+PROBE: NOT DETECTED
SARS-COV-2 RNA RESP QL NAA+PROBE: NOT DETECTED

## 2024-05-14 NOTE — TELEPHONE ENCOUNTER
From: Patel Montalvo  To: Haylee Núñez  Sent: 5/14/2024 4:34 PM CDT  Subject: Hi Dr Núñez - Need a help    Hi Doc  Hope you and family are doing well  I have been unanswered the weather this weekend and have clear lungs. I went to walk in clinic and you can see details of test results and all came back negative.  When I went pass urine, it burns and we did not do a urine test. I would love to get that done also and then do a follow up with you.  Is it possible for you to put in an order from r me to get the test done.  I have no cold or cough and my lungs are clear the physician said  Appreciate your help  See you soon  Patel

## 2024-05-14 NOTE — TELEPHONE ENCOUNTER
Last OV:  1/6/23 PE         next OV:  none    Pt seen in [facility] yesterday for fever 102/body aches. Covid/Flu/RSV Swab negative.    Pt sent [employer] message  reporting burning with urination, no UA was done yesterday.  Dr Núñez is out of the office, Dr Cho is off tomorrow.  Pt advised to return to [facility] tonight for burning with urination.  Follow up appointment scheduled for Dr Cho for Friday May 17. PE scheduled with Dr Núñez 7/15/24.  Pt verbalized understanding and agrees.

## 2024-05-15 ENCOUNTER — OFFICE VISIT (OUTPATIENT)
Dept: FAMILY MEDICINE CLINIC | Facility: CLINIC | Age: 59
End: 2024-05-15

## 2024-05-15 VITALS
WEIGHT: 172 LBS | TEMPERATURE: 98 F | OXYGEN SATURATION: 98 % | HEART RATE: 82 BPM | DIASTOLIC BLOOD PRESSURE: 77 MMHG | HEIGHT: 71 IN | BODY MASS INDEX: 24.08 KG/M2 | SYSTOLIC BLOOD PRESSURE: 109 MMHG | RESPIRATION RATE: 18 BRPM

## 2024-05-15 DIAGNOSIS — N30.01 ACUTE CYSTITIS WITH HEMATURIA: Primary | ICD-10-CM

## 2024-05-15 LAB
APPEARANCE: CLEAR
BILIRUBIN: NEGATIVE
GLUCOSE (URINE DIPSTICK): NEGATIVE MG/DL
KETONES (URINE DIPSTICK): NEGATIVE MG/DL
MULTISTIX LOT#: ABNORMAL NUMERIC
NITRITE, URINE: NEGATIVE
PH, URINE: 6.5 (ref 4.5–8)
PROTEIN (URINE DIPSTICK): NEGATIVE MG/DL
SPECIFIC GRAVITY: 1 (ref 1–1.03)
URINE-COLOR: YELLOW
UROBILINOGEN,SEMI-QN: 0.2 MG/DL (ref 0–1.9)

## 2024-05-15 PROCEDURE — 99213 OFFICE O/P EST LOW 20 MIN: CPT | Performed by: PHYSICIAN ASSISTANT

## 2024-05-15 PROCEDURE — 87077 CULTURE AEROBIC IDENTIFY: CPT | Performed by: PHYSICIAN ASSISTANT

## 2024-05-15 PROCEDURE — 3078F DIAST BP <80 MM HG: CPT | Performed by: PHYSICIAN ASSISTANT

## 2024-05-15 PROCEDURE — 87186 SC STD MICRODIL/AGAR DIL: CPT | Performed by: PHYSICIAN ASSISTANT

## 2024-05-15 PROCEDURE — 87086 URINE CULTURE/COLONY COUNT: CPT | Performed by: PHYSICIAN ASSISTANT

## 2024-05-15 PROCEDURE — 3008F BODY MASS INDEX DOCD: CPT | Performed by: PHYSICIAN ASSISTANT

## 2024-05-15 PROCEDURE — 3074F SYST BP LT 130 MM HG: CPT | Performed by: PHYSICIAN ASSISTANT

## 2024-05-15 PROCEDURE — 81003 URINALYSIS AUTO W/O SCOPE: CPT | Performed by: PHYSICIAN ASSISTANT

## 2024-05-15 RX ORDER — SULFAMETHOXAZOLE AND TRIMETHOPRIM 800; 160 MG/1; MG/1
1 TABLET ORAL 2 TIMES DAILY
Qty: 20 TABLET | Refills: 0 | Status: SHIPPED | OUTPATIENT
Start: 2024-05-15 | End: 2024-05-25

## 2024-05-15 NOTE — PROGRESS NOTES
CHIEF COMPLAINT:     Chief Complaint   Patient presents with    UTI     Symptoms started on Monday : headache, 101 fever ,   and burning sensation while urinating.         HPI:   Patel Montalvo is a 58 year old male who presents with symptoms of UTI. Complaining of urinary frequency, urgency, dysuria for last 2 days. Symptoms seem to be improving just with drinking water. Less frequency and burning this am. Patient was seen two days ago for flu like symptoms- fever/aches and had negative viral panel. Fever is improving. Last tylenol at 715 am. Continued headache. No Nasal congestion, sore throat, cough, chest pain, or SOB.  Denies flank pain, abdominal pain, back pain, hematuria, nausea, or vomiting.  Denies unusual penile discharge/rash/itching, no new sexual partners in the last 3 months, or recent unprotected sexual intercourse. No Hx of UTIs, kidney infection History of kidney cancer- in remission.     Current Outpatient Medications   Medication Sig Dispense Refill    sulfamethoxazole-trimethoprim -160 MG Oral Tab per tablet Take 1 tablet by mouth 2 (two) times daily for 10 days. 20 tablet 0    niacin 100 MG Oral Tab Take 100 mg by mouth nightly.      Cholecalciferol (VITAMIN D) 50 MCG (2000 UT) Oral Cap Take by mouth.        Past Medical History:    Back problem    Cancer (HCC)    lymphoma    Exposure to medical diagnostic radiation    10/2020    Hyperlipidemia    Personal history of antineoplastic chemotherapy    10/2020    Visual impairment    glasses    Vitamin D deficiency      Social History:  Social History     Socioeconomic History    Marital status:     Number of children: 2   Occupational History    Occupation:    Tobacco Use    Smoking status: Never    Smokeless tobacco: Never   Vaping Use    Vaping status: Never Used   Substance and Sexual Activity    Alcohol use: Not Currently    Drug use: No    Sexual activity: Yes     Partners: Female   Other Topics Concern    Caffeine Concern  Yes     Comment: 2 cups of tea dly    Exercise Yes     Comment: 2 x week   Social History Narrative    , lives at home with wife    Has 2 daughters (19 and 17) - one at [employer], 1 is a senior in     On disability at this time         REVIEW OF SYSTEMS:   GENERAL: See HPI   SKIN: no rashes, no skin wounds or ulcers.  CARDIOVASCULAR: denies chest pain or palpitations  LUNGS: denies shortness of breath, cough, or wheezing  GI: See HPI. No N/V/C/D.   : See HPI.  Musculo: No back pain     EXAM:   /77   Pulse 82   Temp 97.9 °F (36.6 °C) (Temporal)   Resp 18   Ht 5' 11\" (1.803 m)   Wt 172 lb (78 kg)   SpO2 98%   BMI 23.99 kg/m²   Physical Exam  Constitutional:       Appearance: Normal appearance. He is not ill-appearing.   HENT:      Head: Normocephalic and atraumatic.      Nose: Nose normal.      Mouth/Throat:      Mouth: Mucous membranes are moist.      Pharynx: Oropharynx is clear. No posterior oropharyngeal erythema.   Eyes:      Conjunctiva/sclera: Conjunctivae normal.   Cardiovascular:      Rate and Rhythm: Normal rate and regular rhythm.      Heart sounds: No murmur heard.  Pulmonary:      Effort: Pulmonary effort is normal.      Breath sounds: Normal breath sounds. No wheezing or rhonchi.   Abdominal:      General: Abdomen is flat. Bowel sounds are normal. There is no distension.      Palpations: Abdomen is soft. There is no mass.      Tenderness: There is no abdominal tenderness. There is no right CVA tenderness, left CVA tenderness or guarding.   Genitourinary:     Pubic Area: No rash.       Penis: No discharge, swelling or lesions.    Musculoskeletal:      Cervical back: Normal range of motion and neck supple.   Lymphadenopathy:      Cervical: No cervical adenopathy.   Neurological:      Mental Status: He is alert.           Recent Results (from the past 24 hour(s))   URINALYSIS, AUTO, W/O SCOPE    Collection Time: 05/15/24  9:22 AM   Result Value Ref Range    Glucose Urine  Negative Negative mg/dL    Bilirubin Urine Negative Negative    Ketones, UA Negative Negative - Trace mg/dL    Spec Gravity 1.005 1.005 - 1.030    Blood Urine Trace-lysed (A) Negative    PH Urine 6.5 5.0 - 8.0    Protein Urine Negative Negative - Trace mg/dL    Urobilinogen Urine 0.2 0.2 - 1.0 mg/dL    Nitrite Urine Negative Negative    Leukocyte Esterase Urine Trace (A) Negative    APPEARANCE clear Clear    Color Urine yellow Yellow    Multistix Lot# [id number] Numeric    Multistix Expiration Date 12-31-24 Date         ASSESSMENT AND PLAN:   Patel Montalvo is a 58 year old male presents with UTI symptoms.    ASSESSMENT:  Encounter Diagnosis   Name Primary?    Acute cystitis with hematuria Yes       PLAN: Meds as listed below.  Comfort measures as described in Patient Instructions. Will send urine culture.     Meds & Refills for this Visit:  Requested Prescriptions     Signed Prescriptions Disp Refills    sulfamethoxazole-trimethoprim -160 MG Oral Tab per tablet 20 tablet 0     Sig: Take 1 tablet by mouth 2 (two) times daily for 10 days.       Risk and benefits of medication discussed.   Stressed importance of completing full course of antibiotic unless told otherwise.     The patient indicates understanding of these issues and agrees to the plan.  The patient is asked to see PCP in 3 days if not better. Seek care immediately for new onset of fever, vomiting, worsening symptoms.    Patient Instructions   Fluids   Continue Tylenol as needed for headache/fever   Will call or MyChart with culture results   If culture is negative, close follow up with PCP encouraged for further workup   ED for abdominal pain, persistent fever, vomiting, back pain, worsening headache

## 2024-05-15 NOTE — PATIENT INSTRUCTIONS
Fluids   Continue Tylenol as needed for headache/fever   Will call or MyChart with culture results   If culture is negative, close follow up with PCP encouraged for further workup   ED for abdominal pain, persistent fever, vomiting, back pain, worsening headache

## 2024-05-16 ENCOUNTER — OFFICE VISIT (OUTPATIENT)
Dept: FAMILY MEDICINE CLINIC | Facility: CLINIC | Age: 59
End: 2024-05-16

## 2024-05-16 VITALS
HEART RATE: 80 BPM | TEMPERATURE: 98 F | RESPIRATION RATE: 16 BRPM | SYSTOLIC BLOOD PRESSURE: 140 MMHG | DIASTOLIC BLOOD PRESSURE: 76 MMHG | BODY MASS INDEX: 24.08 KG/M2 | HEIGHT: 71 IN | WEIGHT: 172 LBS | OXYGEN SATURATION: 99 %

## 2024-05-16 DIAGNOSIS — M54.41 ACUTE BILATERAL LOW BACK PAIN WITH RIGHT-SIDED SCIATICA: Primary | ICD-10-CM

## 2024-05-16 PROCEDURE — 3078F DIAST BP <80 MM HG: CPT | Performed by: NURSE PRACTITIONER

## 2024-05-16 PROCEDURE — 99213 OFFICE O/P EST LOW 20 MIN: CPT | Performed by: NURSE PRACTITIONER

## 2024-05-16 PROCEDURE — 3077F SYST BP >= 140 MM HG: CPT | Performed by: NURSE PRACTITIONER

## 2024-05-16 PROCEDURE — 3008F BODY MASS INDEX DOCD: CPT | Performed by: NURSE PRACTITIONER

## 2024-05-16 RX ORDER — CYCLOBENZAPRINE HCL 10 MG
10 TABLET ORAL 3 TIMES DAILY PRN
Qty: 15 TABLET | Refills: 0 | Status: SHIPPED | OUTPATIENT
Start: 2024-05-16

## 2024-05-16 RX ORDER — NAPROXEN 500 MG/1
500 TABLET ORAL 2 TIMES DAILY WITH MEALS
Qty: 20 TABLET | Refills: 0 | Status: SHIPPED | OUTPATIENT
Start: 2024-05-16 | End: 2024-05-26

## 2024-05-16 NOTE — PATIENT INSTRUCTIONS
1. Rest your back and avoid overuse   2. Apply heat or ice to muscles, whichever feels better  3. Take naproxen as prescribed WITH FOOD  4. Do not take any other nsaid while on naproxen (i.e. NO ibuprofen, motrin, advil, aleve, aspirin) as these have additive effects  5. Take tylenol as needed  6. Take flexeril as prescribed. Take caution while on this medication. Do not drive or operate machinery as this medication may make you drowsy  7. Follow up with primary care within 1 week  8. Seek immediate attention in ER for worsening of symptoms, loss of sensation, decreased range of motrion, bowel or bladder function loss.

## 2024-05-16 NOTE — PROGRESS NOTES
Subjective:   Patient ID: Patel Montalvo is a 58 year old male.    Patient is a 58 year old male who presents today with complaints of bilateral low back pain x this morning. Notes his back \"spasmed\" when he went to stand up from sitting position after using the bathroom. Has happened before, last time 2017. This feels the same. Pain exacerbated with flexion and extension of back. + sciatica to right side. Notes he's been sleeping on an air mattress the past 5 days and unsure if this is cause? Of note, patient was seen in [facility] yesterday for UTI symptoms. Is on Bactrim and notes improving UTI symptoms. Denies fevers, body aches, chills, headaches, spinal tenderness, neck pain, fall or injury to back. Denies n/v/d, abdominal pain. No hematuria or flank pain. Tolerating PO well at home. Attempted treatment prior to arrival = none.    Urine cx results pending.    Uro f/u appointment next week.        History/Other:   Review of Systems   Constitutional:  Negative for appetite change, chills and fever.   Gastrointestinal:  Negative for abdominal pain, diarrhea, nausea and vomiting.   Genitourinary:  Negative for flank pain and hematuria.   Musculoskeletal:  Positive for back pain. Negative for myalgias and neck pain.   Neurological:  Negative for headaches.     Current Outpatient Medications   Medication Sig Dispense Refill    naproxen 500 MG Oral Tab Take 1 tablet (500 mg total) by mouth 2 (two) times daily with meals for 10 days. 20 tablet 0    cyclobenzaprine 10 MG Oral Tab Take 1 tablet (10 mg total) by mouth 3 (three) times daily as needed for Muscle spasms. 15 tablet 0    sulfamethoxazole-trimethoprim -160 MG Oral Tab per tablet Take 1 tablet by mouth 2 (two) times daily for 10 days. 20 tablet 0    Cholecalciferol (VITAMIN D) 50 MCG (2000 UT) Oral Cap Take by mouth.      niacin 100 MG Oral Tab Take 100 mg by mouth nightly. (Patient not taking: Reported on 5/16/2024)       Allergies:  Allergies   Allergen Reactions     Compazine [Prochlorperazine] ANXIETY and Tightness in Chest     /76   Pulse 80   Temp 98.2 °F (36.8 °C)   Resp 16   Ht 5' 11\" (1.803 m)   Wt 172 lb (78 kg)   SpO2 99%   BMI 23.99 kg/m²     Objective:   Physical Exam  Vitals reviewed.   Constitutional:       General: He is awake. He is not in acute distress.     Appearance: Normal appearance. He is well-developed and well-groomed. He is not ill-appearing, toxic-appearing or diaphoretic.   HENT:      Head: Normocephalic and atraumatic.   Cardiovascular:      Rate and Rhythm: Normal rate and regular rhythm.   Pulmonary:      Effort: Pulmonary effort is normal. No respiratory distress.      Breath sounds: Normal breath sounds and air entry. No decreased breath sounds, wheezing, rhonchi or rales.   Abdominal:      Tenderness: There is no right CVA tenderness or left CVA tenderness.   Musculoskeletal:      Cervical back: No bony tenderness.      Thoracic back: No bony tenderness.      Lumbar back: Spasms and tenderness present. No swelling, edema, deformity or bony tenderness. Decreased range of motion.        Back:    Skin:     General: Skin is warm and dry.   Neurological:      Mental Status: He is alert and oriented to person, place, and time.   Psychiatric:         Behavior: Behavior is cooperative.         Assessment & Plan:   1. Acute bilateral low back pain with right-sided sciatica        No orders of the defined types were placed in this encounter.      Meds This Visit:  Requested Prescriptions     Signed Prescriptions Disp Refills    naproxen 500 MG Oral Tab 20 tablet 0     Sig: Take 1 tablet (500 mg total) by mouth 2 (two) times daily with meals for 10 days.    cyclobenzaprine 10 MG Oral Tab 15 tablet 0     Sig: Take 1 tablet (10 mg total) by mouth 3 (three) times daily as needed for Muscle spasms.     Reassuring physical exam findings. Vitals WNL.   HPI, duration of symptoms and clinical exam findings consistent with MSK etiology.  START  Naproxen BID and Flexeril PRN.  Last GFR = 77 (Jan 2023).  Supportive care and return to care measures reviewed.  To f/u Uro next week as scheduled. Also has appointments in the following week with onc and pcp.  Patient v/u and is comfortable with this plan.    Patient Instructions   1. Rest your back and avoid overuse   2. Apply heat or ice to muscles, whichever feels better  3. Take naproxen as prescribed WITH FOOD  4. Do not take any other nsaid while on naproxen (i.e. NO ibuprofen, motrin, advil, aleve, aspirin) as these have additive effects  5. Take tylenol as needed  6. Take flexeril as prescribed. Take caution while on this medication. Do not drive or operate machinery as this medication may make you drowsy  7. Follow up with primary care within 1 week  8. Seek immediate attention in ER for worsening of symptoms, loss of sensation, decreased range of motrion, bowel or bladder function loss.

## 2024-05-22 ENCOUNTER — OFFICE VISIT (OUTPATIENT)
Dept: SURGERY | Facility: CLINIC | Age: 59
End: 2024-05-22

## 2024-05-22 DIAGNOSIS — C64.2 RENAL CELL CARCINOMA OF LEFT KIDNEY (HCC): Primary | ICD-10-CM

## 2024-05-22 DIAGNOSIS — R82.90 URINE FINDING: ICD-10-CM

## 2024-05-22 LAB
APPEARANCE: CLEAR
BILIRUBIN: NEGATIVE
GLUCOSE (URINE DIPSTICK): NEGATIVE MG/DL
KETONES (URINE DIPSTICK): NEGATIVE MG/DL
LEUKOCYTES: NEGATIVE
MULTISTIX LOT#: NORMAL NUMERIC
NITRITE, URINE: NEGATIVE
OCCULT BLOOD: NEGATIVE
PH, URINE: 6 (ref 4.5–8)
PROTEIN (URINE DIPSTICK): NEGATIVE MG/DL
SPECIFIC GRAVITY: 1.01 (ref 1–1.03)
URINE-COLOR: YELLOW
UROBILINOGEN,SEMI-QN: 0.2 MG/DL (ref 0–1.9)

## 2024-05-22 PROCEDURE — 81003 URINALYSIS AUTO W/O SCOPE: CPT

## 2024-05-22 PROCEDURE — 99213 OFFICE O/P EST LOW 20 MIN: CPT

## 2024-05-22 NOTE — PROGRESS NOTES
[facility], [facility]    Urology Consult Note    History of Present Illness:   Patient is a(n) 58 year old male with hx of HLD, lymphoma and RCC s/p RFA who presents for follow up.    Pt last saw Dr. Alonso in 7/26/22 for RCC. CT in 2022 showed resolution of vascular mass without evidence of enhancement in the rim of tissue.     He had been doing well since. Most recently, had an instance of UTI with sx of fever, chills, myalgia, dysuria, frequency, urgency.     Culture on 5/15/24 >100k enterococcus, treated with macrobid bid x 5 days. Sx largely improved and feels back to baseline.     At baseline, no urinary complaints. AUA 3/35. First UTI in lifetime. Poor water intake at baseline. UA today neg.   HISTORY:  Past Medical History:    Back problem    Cancer (HCC)    lymphoma    Exposure to medical diagnostic radiation    10/2020    Hyperlipidemia    Personal history of antineoplastic chemotherapy    10/2020    Visual impairment    glasses    Vitamin D deficiency      Past Surgical History:   Procedure Laterality Date    Appendectomy  age 19    Biopsy Left 06/2020    left neck LN biopsy    Colonoscopy  05/2021    Other Right     portacath placement    Removal of tonsils,12+ y/o N/A 5/27/2016    Procedure: TONSILLECTOMY;  Surgeon: Sreekanth Daniels MD;  Location: [facility]      Family History   Problem Relation Age of Onset    Breast Cancer Maternal Grandmother 53    Blood Disorder Maternal Grandmother         cerebral artery aneurysm    Arthritis Maternal Grandmother         osteoarthritis    Cancer Daughter         burkitt lymphoma - intraabdominal or soft tissues    Seizure Disorder Father         epilepsy      Social History:   Social History     Socioeconomic History    Marital status:     Number of children: 2   Occupational History    Occupation:    Tobacco Use    Smoking status: Never    Smokeless tobacco: Never   Vaping Use    Vaping status: Never  Used   Substance and Sexual Activity    Alcohol use: Not Currently    Drug use: No    Sexual activity: Yes     Partners: Female   Other Topics Concern    Caffeine Concern Yes     Comment: 2 cups of tea dly    Exercise Yes     Comment: 2 x week   Social History Narrative    , lives at home with wife    Has 2 daughters (19 and 17) - one at [employer], 1 is a senior in     On disability at this time        Allergies  Allergies   Allergen Reactions    Compazine [Prochlorperazine] ANXIETY and Tightness in Chest       Review of Systems:   A 10-point review of systems was completed and is negative other than as noted above.    Physical Exam:   There were no vitals taken for this visit.    GENERAL APPEARANCE: no acute distress  NEUROLOGIC: converses appropriately  HEAD: atraumatic, normocephalic  LUNGS: non-labored breathing  ABDOMEN: soft, nontender, non-distended  BACK: no CVA tenderness  PSYCH: appropriate affect and mood    Results:     Laboratory Data:  Lab Results   Component Value Date    WBC 8.0 01/09/2023    HGB 15.1 01/09/2023    .0 01/09/2023     Lab Results   Component Value Date     01/09/2023    K 3.7 01/09/2023     01/09/2023    CO2 28.0 01/09/2023    BUN 9 01/09/2023     (H) 01/09/2023    GFRAA 83 07/20/2022    AST 28 01/09/2023    ALT 58 01/09/2023    TP 7.5 01/09/2023    ALB 4.1 01/09/2023    PHOS 3.4 07/14/2017    CA 9.0 01/09/2023    MG 2.2 08/06/2020       Urinalysis Results (last 3 years):  Recent Labs     09/13/21  0748 12/10/21  0929 12/10/21  0935 07/26/22  1705 01/09/23  0719 05/15/24  0922 05/22/24  1344   COLORUR Yellow  --  Yellow  --  Yellow  --   --    CLARITY Clear  --  Clear  --  Clear  --   --    SPECGRAVITY 1.017 1.030 1.020 1.020 1.018 1.005 1.010   PHURINE 5.0 6.0 6.0 7.5 5.0 6.5 6.0   PROUR Negative  --  Negative  --  Negative  --   --    GLUUR Negative  --  50*  --  Negative  --   --    KETUR Negative  --  Negative  --  Negative  --   --     BILUR Negative  --  Negative  --  Negative  --   --    BLOODURINE Negative  --  Small*  --  Small*  --   --    NITRITE Negative Negative Negative Negative Negative Negative Negative   UROBILINOGEN <2.0  --  <2.0  --  <2.0  --   --    LEUUR Negative  --  Negative  --  Negative  --   --    WBCUR  --   --  1-5  --  1-5  --   --    RBCUR  --   --  0-2  --  0-2  --   --    BACUR  --   --  None Seen  --  None Seen  --   --        Urine Culture Results (last 3 years):  Lab Results   Component Value Date    URINECUL >100,000 CFU/ML Enterococcus faecalis NOT VRE (A) 05/15/2024    URINECUL No Growth at 18-24 hrs. 08/04/2020       Imaging  No results found.      Impression:   Recommendations:  RCC  - repeat CTU given RCC hx and severity of UTI sx  - in the meantime, push fluids to prevent infxn  - will get repeat culture if sx return shortly     Thank you very much for this consult. Please call if there are any questions or concerns.     Ramonita Dixon PA-C  Urology  [facility]  Phone: [phone]    Date: 5/22/2024  Time: 2:02 PM

## 2024-06-05 NOTE — PROGRESS NOTES
Outpatient Oncology Care Plan  Problem list:  knowledge deficit    Problems related to:    chemotherapy  disease/disease progression    Interventions:  provided general teaching    Expected outcomes:  understands plan of care    Progress towards outcome:
Mohit Pearl  Surgery  [address]  [place] [address]  Phone: [phone]  Fax: [phone]  Follow Up Time: 2 weeks

## 2024-06-16 ENCOUNTER — HOSPITAL ENCOUNTER (OUTPATIENT)
Dept: CT IMAGING | Age: 59
Discharge: HOME | End: 2024-06-16

## 2024-06-16 ENCOUNTER — HOSPITAL ENCOUNTER (OUTPATIENT)
Dept: CT IMAGING | Age: 59
End: 2024-06-16

## 2024-06-16 DIAGNOSIS — C64.2 RENAL CELL CARCINOMA OF LEFT KIDNEY (HCC): ICD-10-CM

## 2024-06-16 LAB
CREAT BLD-MCNC: 0.9 MG/DL
EGFRCR SERPLBLD CKD-EPI 2021: 100 ML/MIN/1.73M2 (ref 60–?)

## 2024-06-16 PROCEDURE — 74178 CT ABD&PLV WO CNTR FLWD CNTR: CPT

## 2024-06-16 PROCEDURE — 82565 ASSAY OF CREATININE: CPT

## 2024-06-16 PROCEDURE — 76377 3D RENDER W/INTRP POSTPROCES: CPT

## 2024-07-15 ENCOUNTER — OFFICE VISIT (OUTPATIENT)
Dept: FAMILY MEDICINE CLINIC | Facility: CLINIC | Age: 59
End: 2024-07-15
Payer: COMMERCIAL

## 2024-07-15 ENCOUNTER — HOSPITAL ENCOUNTER (OUTPATIENT)
Dept: ULTRASOUND IMAGING | Age: 59
Discharge: HOME | End: 2024-07-15
Attending: FAMILY MEDICINE
Payer: COMMERCIAL

## 2024-07-15 VITALS
RESPIRATION RATE: 16 BRPM | HEART RATE: 67 BPM | DIASTOLIC BLOOD PRESSURE: 74 MMHG | BODY MASS INDEX: 24.92 KG/M2 | HEIGHT: 71 IN | SYSTOLIC BLOOD PRESSURE: 128 MMHG | OXYGEN SATURATION: 97 % | WEIGHT: 178 LBS

## 2024-07-15 DIAGNOSIS — Z00.00 ROUTINE GENERAL MEDICAL EXAMINATION AT A HEALTH CARE FACILITY: Primary | ICD-10-CM

## 2024-07-15 DIAGNOSIS — E55.9 VITAMIN D DEFICIENCY: ICD-10-CM

## 2024-07-15 DIAGNOSIS — Z00.00 LABORATORY EXAMINATION ORDERED AS PART OF A ROUTINE GENERAL MEDICAL EXAMINATION: ICD-10-CM

## 2024-07-15 DIAGNOSIS — C81.41: ICD-10-CM

## 2024-07-15 DIAGNOSIS — Z86.39 HISTORY OF IRON DEFICIENCY: ICD-10-CM

## 2024-07-15 DIAGNOSIS — Z87.440 HISTORY OF UTI: ICD-10-CM

## 2024-07-15 DIAGNOSIS — C64.2 CANCER OF LEFT KIDNEY (HCC): ICD-10-CM

## 2024-07-15 DIAGNOSIS — Z13.89 SCREENING FOR GENITOURINARY CONDITION: ICD-10-CM

## 2024-07-15 LAB
ALBUMIN SERPL-MCNC: 4.2 G/DL (ref 3.4–5)
ALBUMIN/GLOB SERPL: 1.1 {RATIO} (ref 1–2)
ALP LIVER SERPL-CCNC: 55 U/L
ALT SERPL-CCNC: 46 U/L
ANION GAP SERPL CALC-SCNC: 10 MMOL/L (ref 0–18)
AST SERPL-CCNC: 22 U/L (ref 15–37)
BASOPHILS # BLD AUTO: 0.08 X10(3) UL (ref 0–0.2)
BASOPHILS NFR BLD AUTO: 1.4 %
BILIRUB SERPL-MCNC: 0.5 MG/DL (ref 0.1–2)
BILIRUB UR QL STRIP.AUTO: NEGATIVE
BUN BLD-MCNC: 11 MG/DL (ref 9–23)
CALCIUM BLD-MCNC: 9.2 MG/DL (ref 8.5–10.1)
CHLORIDE SERPL-SCNC: 107 MMOL/L (ref 98–112)
CHOLEST SERPL-MCNC: 176 MG/DL (ref ?–200)
CLARITY UR REFRACT.AUTO: CLEAR
CO2 SERPL-SCNC: 24 MMOL/L (ref 21–32)
CREAT BLD-MCNC: 1.2 MG/DL
CREAT UR-SCNC: 220 MG/DL
EGFRCR SERPLBLD CKD-EPI 2021: 71 ML/MIN/1.73M2 (ref 60–?)
EOSINOPHIL # BLD AUTO: 0.11 X10(3) UL (ref 0–0.7)
EOSINOPHIL NFR BLD AUTO: 1.9 %
ERYTHROCYTE [DISTWIDTH] IN BLOOD BY AUTOMATED COUNT: 14.8 %
EST. AVERAGE GLUCOSE BLD GHB EST-MCNC: 131 MG/DL (ref 68–126)
FASTING PATIENT LIPID ANSWER: YES
FASTING STATUS PATIENT QL REPORTED: YES
GLOBULIN PLAS-MCNC: 3.7 G/DL (ref 2.8–4.4)
GLUCOSE BLD-MCNC: 99 MG/DL (ref 70–99)
GLUCOSE UR STRIP.AUTO-MCNC: NORMAL MG/DL
HBA1C MFR BLD: 6.2 % (ref ?–5.7)
HCT VFR BLD AUTO: 40.2 %
HDLC SERPL-MCNC: 35 MG/DL (ref 40–59)
HGB BLD-MCNC: 13.5 G/DL
IMM GRANULOCYTES # BLD AUTO: 0.01 X10(3) UL (ref 0–1)
IMM GRANULOCYTES NFR BLD: 0.2 %
KETONES UR STRIP.AUTO-MCNC: NEGATIVE MG/DL
LDLC SERPL CALC-MCNC: 107 MG/DL (ref ?–100)
LEUKOCYTE ESTERASE UR QL STRIP.AUTO: NEGATIVE
LYMPHOCYTES # BLD AUTO: 2.87 X10(3) UL (ref 1–4)
LYMPHOCYTES NFR BLD AUTO: 48.5 %
MCH RBC QN AUTO: 26.8 PG (ref 26–34)
MCHC RBC AUTO-ENTMCNC: 33.6 G/DL (ref 31–37)
MCV RBC AUTO: 79.8 FL
MICROALBUMIN UR-MCNC: 3.46 MG/DL
MICROALBUMIN/CREAT 24H UR-RTO: 15.7 UG/MG (ref ?–30)
MONOCYTES # BLD AUTO: 0.53 X10(3) UL (ref 0.1–1)
MONOCYTES NFR BLD AUTO: 9 %
NEUTROPHILS # BLD AUTO: 2.32 X10 (3) UL (ref 1.5–7.7)
NEUTROPHILS # BLD AUTO: 2.32 X10(3) UL (ref 1.5–7.7)
NEUTROPHILS NFR BLD AUTO: 39 %
NITRITE UR QL STRIP.AUTO: NEGATIVE
NONHDLC SERPL-MCNC: 141 MG/DL (ref ?–130)
OSMOLALITY SERPL CALC.SUM OF ELEC: 291 MOSM/KG (ref 275–295)
PH UR STRIP.AUTO: 6 [PH] (ref 5–8)
PLATELET # BLD AUTO: 306 10(3)UL (ref 150–450)
POTASSIUM SERPL-SCNC: 3.9 MMOL/L (ref 3.5–5.1)
PROT SERPL-MCNC: 7.9 G/DL (ref 6.4–8.2)
PROT UR STRIP.AUTO-MCNC: NEGATIVE MG/DL
PSA FREE MFR SERPL: 10 %
PSA FREE SERPL-MCNC: 1.14 NG/ML
PSA SERPL-MCNC: 10.9 NG/ML (ref ?–4)
RBC # BLD AUTO: 5.04 X10(6)UL
RBC UR QL AUTO: NEGATIVE
SODIUM SERPL-SCNC: 141 MMOL/L (ref 136–145)
SP GR UR STRIP.AUTO: 1.02 (ref 1–1.03)
T4 FREE SERPL-MCNC: 0.9 NG/DL (ref 0.8–1.7)
TRIGL SERPL-MCNC: 195 MG/DL (ref 30–149)
TSI SER-ACNC: 4.73 MIU/ML (ref 0.36–3.74)
UROBILINOGEN UR STRIP.AUTO-MCNC: NORMAL MG/DL
VIT D+METAB SERPL-MCNC: 24.5 NG/ML (ref 30–100)
VLDLC SERPL CALC-MCNC: 33 MG/DL (ref 0–30)
WBC # BLD AUTO: 5.9 X10(3) UL (ref 4–11)

## 2024-07-15 PROCEDURE — 80061 LIPID PANEL: CPT | Performed by: FAMILY MEDICINE

## 2024-07-15 PROCEDURE — 84154 ASSAY OF PSA FREE: CPT | Performed by: FAMILY MEDICINE

## 2024-07-15 PROCEDURE — 81003 URINALYSIS AUTO W/O SCOPE: CPT | Performed by: FAMILY MEDICINE

## 2024-07-15 PROCEDURE — 82570 ASSAY OF URINE CREATININE: CPT | Performed by: FAMILY MEDICINE

## 2024-07-15 PROCEDURE — 83036 HEMOGLOBIN GLYCOSYLATED A1C: CPT | Performed by: FAMILY MEDICINE

## 2024-07-15 PROCEDURE — 80050 GENERAL HEALTH PANEL: CPT | Performed by: FAMILY MEDICINE

## 2024-07-15 PROCEDURE — 84439 ASSAY OF FREE THYROXINE: CPT | Performed by: FAMILY MEDICINE

## 2024-07-15 PROCEDURE — 82306 VITAMIN D 25 HYDROXY: CPT | Performed by: FAMILY MEDICINE

## 2024-07-15 PROCEDURE — 76536 US EXAM OF HEAD AND NECK: CPT | Performed by: FAMILY MEDICINE

## 2024-07-15 PROCEDURE — 82043 UR ALBUMIN QUANTITATIVE: CPT | Performed by: FAMILY MEDICINE

## 2024-07-15 PROCEDURE — 84153 ASSAY OF PSA TOTAL: CPT | Performed by: FAMILY MEDICINE

## 2024-07-15 NOTE — H&P
Patel Montalvo is a 59 year old male who presents for a complete physical exam.   HPI:   Pt complains of nothing.  Recently had UTI and tx with bactrim.  Notes new LAD on left neck.    Wt Readings from Last 6 Encounters:   07/15/24 178 lb (80.7 kg)   05/16/24 172 lb (78 kg)   05/15/24 172 lb (78 kg)   05/13/24 172 lb (78 kg)   01/27/23 171 lb 12.8 oz (77.9 kg)   01/06/23 176 lb (79.8 kg)     Body mass index is 24.83 kg/m².     Results for orders placed or performed during the hospital encounter of 06/16/24   POCT CREATININE   Result Value Ref Range    ISTAT Creatinine 0.90 0.70 - 1.30 mg/dL    eGFR-Cr 100 >=60 mL/min/1.73m2         Current Outpatient Medications   Medication Sig Dispense Refill    cyclobenzaprine 10 MG Oral Tab Take 1 tablet (10 mg total) by mouth 3 (three) times daily as needed for Muscle spasms. 15 tablet 0    niacin 100 MG Oral Tab Take 1 tablet (100 mg total) by mouth nightly.      Cholecalciferol (VITAMIN D) 50 MCG (2000 UT) Oral Cap Take by mouth.        Allergies   Allergen Reactions    Compazine [Prochlorperazine] ANXIETY and Tightness in Chest      Past Medical History:    Back problem    Cancer (HCC)    lymphoma    Exposure to medical diagnostic radiation    10/2020    Hyperlipidemia    Personal history of antineoplastic chemotherapy    10/2020    Visual impairment    glasses    Vitamin D deficiency      Past Surgical History:   Procedure Laterality Date    Appendectomy  age 19    Biopsy Left 06/2020    left neck LN biopsy    Colonoscopy  05/2021    Other Right     portacath placement    Removal of tonsils,12+ y/o N/A 5/27/2016    Procedure: TONSILLECTOMY;  Surgeon: Sreekanth Daniels MD;  Location: [facility]      Family History   Problem Relation Age of Onset    Breast Cancer Maternal Grandmother 53    Blood Disorder Maternal Grandmother         cerebral artery aneurysm    Arthritis Maternal Grandmother         osteoarthritis    Cancer Daughter         burkitt lymphoma - intraabdominal or  soft tissues    Seizure Disorder Father         epilepsy      Social History:  Social History     Socioeconomic History    Marital status:     Number of children: 2   Occupational History    Occupation:    Tobacco Use    Smoking status: Never    Smokeless tobacco: Never   Vaping Use    Vaping status: Never Used   Substance and Sexual Activity    Alcohol use: Not Currently    Drug use: No    Sexual activity: Yes     Partners: Female   Other Topics Concern    Caffeine Concern Yes     Comment: 2 cups of tea dly    Exercise Yes     Comment: 2 x week   Social History Narrative    , lives at home with wife    Has 2 daughters (19 and 17) - one at [employer], 1 is a senior in     On disability at this time      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   Exercise: minimal.  Diet: watches fats closely, watches sugar closely and watches calories closely     REVIEW OF SYSTEMS:   GENERAL: feels well otherwise  SKIN: denies any unusual skin lesions  EYES:denies blurred vision or double vision  HEENT: denies nasal congestion, sinus pain or ST  LUNGS: denies shortness of breath with exertion  CARDIOVASCULAR: denies chest pain on exertion  GI: denies abdominal pain,denies heartburn  : denies nocturia or changes in stream  MUSCULOSKELETAL: denies back pain  NEURO: denies headaches  PSYCHE: denies depression or anxiety  HEMATOLOGIC: denies hx of anemia  ENDOCRINE: denies thyroid history  ALL/ASTHMA: denies hx of allergy or asthma    EXAM:   /74   Pulse 67   Resp 16   Ht 5' 11\" (1.803 m)   Wt 178 lb (80.7 kg)   SpO2 97%   BMI 24.83 kg/m²   Body mass index is 24.83 kg/m².   GENERAL: well developed, well nourished,in no apparent distress  SKIN: no rashes,no suspicious lesions  HEENT: atraumatic, normocephalic,ears and throat clear  EYES:  EOMI, conjunctiva are clear  NECK: supple, left post. Cervical adenopathy,no bruits, no thyromegaly  CHEST: no chest tenderness; bilateral axillary  adenopathy  BREAST: no dominant or suspicious mass  LUNGS: clear to auscultation  CARDIO: RRR without murmur  GI: good BS's,no masses, HSM or tenderness  : ANNE Bragg present; no testicular masses bilaterally; no hernia bilaterally but weakness noted bilaterally  RECTAL: DEFERRED  MUSCULOSKELETAL: back is not tender,FROM of the back  EXTREMITIES: no cyanosis, clubbing or edema  NEURO: Oriented times three,cranial nerves are intact,motor and sensory are grossly intact; 2 + knee reflexes bilaterally  VASCULAR: 2 + dorsalis pedal pulses bilaterally  Bilateral barefoot skin diabetic exam is normal, visualized feet and the appearance is normal.  Bilateral monofilament/sensation of both feet is normal.  Pulsation pedal pulse exam of both lower legs/feet is normal as well.      ASSESSMENT AND PLAN:   Patel Montalvo is a 59 year old male who presents for a complete physical exam.    Pt's weight is Body mass index is 24.83 kg/m²., recommended low fat diet and aerobic exercise 30 minutes three times weekly.   Health maintenance, will check:   Orders Placed This Encounter   Procedures    CBC W Differential W Platelet [E]    Comp Metabolic Panel (14) [E]    Lipid Panel [E]    TSH W Reflex To Free T4 [E]    PSA, Total W Reflex To Free [E]    Vitamin D [E]    Hemoglobin A1C [E]    Microalb/Creat Ratio, Random Urine [E]    UA/M WITH CULTURE REFLEX [3020]    Vitamin D, 25-Hydroxy       Colonoscopy due  5/2031.  Last eye exam: 6/2024  Last dental exam: 6/2024  Seeing Dr. Vidal and ANNE Holguin -- urology    The patient indicates understanding of these issues and agrees to the plan.  The patient is asked to return in Return in about 6 months (around 1/15/2025) for med check.  .

## 2024-07-16 ENCOUNTER — TELEPHONE (OUTPATIENT)
Dept: FAMILY MEDICINE CLINIC | Facility: CLINIC | Age: 59
End: 2024-07-16

## 2024-07-16 DIAGNOSIS — R97.20 ELEVATED PSA: ICD-10-CM

## 2024-07-16 DIAGNOSIS — R73.03 PREDIABETES: ICD-10-CM

## 2024-07-16 DIAGNOSIS — Z86.39 HISTORY OF IRON DEFICIENCY: ICD-10-CM

## 2024-07-16 DIAGNOSIS — E55.9 VITAMIN D DEFICIENCY: ICD-10-CM

## 2024-07-16 DIAGNOSIS — E78.5 DYSLIPIDEMIA: Primary | ICD-10-CM

## 2024-07-16 DIAGNOSIS — E03.9 HYPOTHYROIDISM, UNSPECIFIED TYPE: ICD-10-CM

## 2024-07-16 RX ORDER — LEVOTHYROXINE SODIUM 0.03 MG/1
25 TABLET ORAL
Qty: 30 TABLET | Refills: 2 | Status: SHIPPED | OUTPATIENT
Start: 2024-07-16

## 2024-07-19 ENCOUNTER — OFFICE VISIT (OUTPATIENT)
Dept: HEMATOLOGY/ONCOLOGY | Age: 59
End: 2024-07-19
Attending: INTERNAL MEDICINE
Payer: COMMERCIAL

## 2024-07-19 VITALS
WEIGHT: 171 LBS | DIASTOLIC BLOOD PRESSURE: 93 MMHG | BODY MASS INDEX: 23.94 KG/M2 | RESPIRATION RATE: 18 BRPM | TEMPERATURE: 99 F | HEIGHT: 70.98 IN | OXYGEN SATURATION: 98 % | SYSTOLIC BLOOD PRESSURE: 153 MMHG | HEART RATE: 94 BPM

## 2024-07-19 DIAGNOSIS — C64.2 CANCER OF LEFT KIDNEY (HCC): ICD-10-CM

## 2024-07-19 DIAGNOSIS — C81.41: Primary | ICD-10-CM

## 2024-07-19 DIAGNOSIS — R59.0 POSTERIOR CERVICAL LYMPHADENOPATHY: ICD-10-CM

## 2024-07-19 PROCEDURE — 99214 OFFICE O/P EST MOD 30 MIN: CPT | Performed by: INTERNAL MEDICINE

## 2024-07-19 NOTE — PROGRESS NOTES
Patient here for 1 year f/u for lymphoma. Patient states he felt a lump on the back of his neck 2 weeks ago. Patient denies pain, redness or warmth. Patient completed a US of his head and neck on Monday. Patient denies fevers and cough. Patient states he had night sweats last week, not sure if it d/t heat wave. Patient is accompanied by his wife. Patient has no further concerns or complaints at this time.     Education Record    Learner:  Patient and Spouse    Disease / Diagnosis: Hodgkin lymphoma      Barriers / Limitations:  None   Comments:    Method:  Discussion   Comments:    General Topics:  Medication, Side effects and symptom management, and Plan of care reviewed   Comments:    Outcome:  Shows understanding   Comments:

## 2024-07-19 NOTE — PROGRESS NOTES
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    He is doing well but he noticed a new lymph node in the left neck. He had an US of the neck on 7/15/2024 that showed a 8 x 6 x 3 mm node at the palpable site with a fatty hilum. He has no fever or sweats. He has no pain. He has good energy level and appetite. He has no weight loss. His mood is good.    He had needle core biopsy of the left kidney mass on 4/14/2021 that showed clear cell RCC, grade I. He had RFA on 4/14/2021. He is following with Dr. Alonso. He had repeat CT scan on 6/2/2021 that showed Interval ablation of the mass.There was no LAD.    He has had two cycles of ABVD followed by radiation from 9/28/2020 to 10/9/2020.     He had a repeat CT scan of the CAP on 3/8/2021. This was negative for lymphadenopathy or evidence of hodgkin's disease. He had a first COVID vaccine after this CT in 3/2021 and a second vaccine in 4/2021. There was a left kidney cystic density. He has an US scheduled later this week.    Review of Systems:   Constitutional: Negative for  fevers, chills, night sweats and weight  loss.  Eyes: Negative for visual disturbance, irritation and redness.  Cardiovascular: Negative for angina, orthopnea or palpitations.  Integument/breast: Negative for rash, skin lesions, and pruritus.  Hematologic/lymphatic: Negative for easy bruising, bleeding  Musculoskeletal: Negative for myalgias, arthralgias, muscle weakness.  Genitourinary: Negative for dysuria or hematuria  The pertinent positives and negatives were described. All other systems were negative.    PMH/PSH:  Past Medical History:    Back problem    Cancer (HCC)    lymphoma    Exposure to medical diagnostic radiation    10/2020    Hyperlipidemia    Personal history of antineoplastic chemotherapy    10/2020    Visual impairment    glasses    Vitamin D deficiency       Past Surgical History:   Procedure Laterality Date    Appendectomy  age 19    Biopsy Left 06/2020    left neck LN biopsy    Colonoscopy  05/2021    Other Right     portacath placement    Removal of tonsils,14+ y/o N/A 5/27/2016    Procedure: TONSILLECTOMY;  Surgeon: Sreekanth Daniels MD;  Location: [facility]       Family History Reviewed:  Family History   Problem Relation Age of Onset    Breast Cancer Maternal Grandmother 53    Blood Disorder Maternal Grandmother         cerebral artery aneurysm    Arthritis Maternal Grandmother         osteoarthritis    Cancer Daughter         burkitt lymphoma - intraabdominal or soft tissues    Seizure Disorder Father         epilepsy       Allergies:     Allergies   Allergen Reactions    Compazine [Prochlorperazine] ANXIETY and Tightness in Chest       Medications:   levothyroxine 25 MCG Oral Tab Take 1 tablet (25 mcg total) by mouth before breakfast. 30 tablet 2    cyclobenzaprine 10 MG Oral Tab Take 1 tablet (10 mg total) by mouth 3 (three) times daily as needed for Muscle spasms. 15 tablet 0    niacin 100 MG Oral Tab Take 1 tablet (100 mg total) by mouth nightly.      Cholecalciferol (VITAMIN D) 50 MCG (2000 UT) Oral Cap Take by mouth.            Vital Signs:      Height: 180.3 cm (5' 10.98\") (07/19 1043)  Weight: 77.6 kg (171 lb) (07/19 1043)  BSA (Calculated - sq m): 1.97 sq meters (07/19 1043)  Pulse: 94 (07/19 1043)  BP: 153/93 (07/19 1043)  Temp: 98.6 °F (37 °C) (07/19 1043)  Do Not Use - Resp Rate: --  SpO2: 98 % (07/19 1043)      Performance Status:  ECOG 0: Fully active, able to carry on all pre-disease performance without restriction     Physical Examination:    Constitutional: Patient is alert and not in acute distress.  Respiratory: Clear to auscultation and percussion. No rales.  No wheezes.  Cardiovascular: Regular rate and rhythm. No murmurs.  Gastrointestinal: Soft, non tender with good bowel sounds.  Musculoskeletal: No edema. No calf tenderness.  Skin: No suspicious skin lesion, no rash, no ulceration.  Lymphatics: There is no palpable lymphadenopathy throughout in the supraclavicular, or axillary regions. There was a less than 1 cm soft node in the posterior left neck in the region of his concern.  Psychiatric: The patient's mood is calm and appropriate for this visit.      Labs reviewed at this visit:     Lab Results   Component Value Date    WBC 5.9 07/15/2024    RBC 5.04 07/15/2024    HGB 13.5 07/15/2024    HCT 40.2 07/15/2024    MCV 79.8 (L) 07/15/2024    MCH 26.8 07/15/2024    MCHC 33.6 07/15/2024    RDW 14.8 07/15/2024    .0 07/15/2024    MPV 9.5 07/06/2012     Lab Results   Component Value Date     07/15/2024    K 3.9 07/15/2024     07/15/2024    CO2 24.0 07/15/2024    BUN 11 07/15/2024    CREATSERUM 1.20 07/15/2024    GLU 99 07/15/2024    CA 9.2 07/15/2024    ALKPHO 55 07/15/2024    ALT 46 07/15/2024    AST 22 07/15/2024    BILT 0.5 07/15/2024    ALB 4.2 07/15/2024    TP 7.9 07/15/2024     Lab Component   Component Value Date/Time     01/09/2023 0719        Radiologic imaging reviewed at this visit:    US of neck on 7/15/2024:  FINDINGS:    MASSES:  Targeted ultrasound left side of the neck the site  of palpable lump demonstrates an 8 x 6 x 3 mm lymph node.  Additional lymph nodes were imaged in the left side of the neck for comparison purposes.  These measures 6 x 4 x 3 mm, 7 x 7 x 3 mm and   14 x 4 x 3 mm.  All the lymph nodes demonstrate an echogenic fatty natacha.     Impression   CONCLUSION:  Palpable lump left side of the neck corresponds to an 8 x 6 x 3 mm lymph node.     Additional benign-appearing lymph nodes appreciated in the left side of the neck.       Further management of a palpable lymph node should be based on clinical grounds.       CT of CAP on 3/8/2021:  FINDINGS:         CHEST:     LUNGS:  Minimal subsegmental atelectasis in the lung bases. Stable 4 mm subpleural nodule in the right middle lobe, image 95. .     MEDIASTINUM:  No enlarged mediastinal adenopathy.  Small esophageal hiatal hernia.     NATACHA:  No enlarged hilar adenopathy.     CARDIAC:  No enlargement, pericardial thickening, or significant calcification.   PLEURA:  No pneumothorax or effusion.     CHEST WALL:  No enlarged axillary adenopathy.     AORTA:  No aneurysm or dissection.     VASCULATURE:  No visible pulmonary arterial thrombus or attenuation.         ABDOMEN/PELVIS:   LIVER:  Diffuse fatty infiltration of the liver.     BILIARY:  Cholelithiasis.  No biliary ductal dilatation.   PANCREAS:  Homogeneous enhancement.   SPLEEN:  Normal caliber.   KIDNEYS:  No hydronephrosis.  There is a 1.8 cm heterogeneous mass arising from the midpole of the left kidney. This may represent a isodense cyst although solid component cannot be excluded.  This area was not is incompletely characterized on noncontrast PET CT imaging. Recommend correlation with renal ultrasound for further   characterization.   ADRENALS:  Normal.   AORTA/VASCULAR:  No aneurysm.   RETROPERITONEUM:  No enlarged adenopathy.   BOWEL/MESENTERY:  Normal caliber appendix. Uncomplicated colonic diverticulosis   ABDOMINAL WALL:  Small fat containing umbilical hernia.   PELVIC  ORGANS:  Mild prostatic hypertrophy..   BONES:  No lytic or destructive process.  Mild degenerative changes in the lower lumbar facets.      Impression   CONCLUSION:  No enlarged adenopathy in the chest, abdomen or pelvis.       4 mm subpleural nodule in the right middle lobe is stable.       There is a 1.8 cm pedunculated mass arising from the mid to inferior pole of the left kidney.  This may represent a hyperdense cyst.  Solid component cannot be excluded.  Recommend follow-up with ultrasound for further characterization.       Diffuse fatty infiltration of the liver.          PET/CT on 9/9/2020:  FINDINGS:       Improvement. There has been complete resolution of previously demonstrated abnormal activity in the left neck related to previously demonstrated FDG avid lymph nodes. The lymph nodes all have decreased in size as well, and all are now subcentimeter. No new or enlarging mass, or new area of abnormal FDG activity. There is iatrogenic linear activity related to central venous catheter within the SVC.  Normal activity patterns are otherwise seen in the chest, abdomen, pelvis, head and neck. Fatty liver changes are seen    =====  CONCLUSION:       1. Resolution of previously demonstrated abnormal activity in the left neck related to previously demonstrated enlarged and FDG avid lymph nodes. In addition to no longer showing FDG uptake, the lymph nodes are all smaller, all subcentimeter.     2. Iatrogenic activity along the central venous catheter within the chest. Generally this can be seen with some of the injected FDG agent becoming attached to the catheter during injection, possibly related to fibrin and/or chronic thrombus. Advise correlation with catheter functioning.        Assessment/Plan:     Classical Hodgkin's Lymphoma, Lymphocyte-rich type:     He had two cycles of ABVD. He had complete response on repeat CT/PET scan. He completed radiation on 10/9/2020. Repeat CT scan on 6/29/2022 showed no  evidence of progressive disease. He is concerned about a left posterior neck node. This is less than 1 cm and on US appears benign. Given that there is a change in size, we will proceed with work up with a PET/CT scan. If this is negative for abnormal metabolic activity then I will have him return in three months for a repeat exam. If there is any concerning metabolic activity then we will consider proceeding with a biopsy. He was comfortable with this plan.    Left RCC:  Grade I  S/P RFA on 5/14/2021    Continue with six month follow up with labs. Continue with  follow up. The plan is for yearly imaging.        Sreekanth Vidal MD

## 2024-07-26 ENCOUNTER — HOSPITAL ENCOUNTER (OUTPATIENT)
Dept: NUCLEAR MEDICINE | Facility: HOSPITAL | Age: 59
Discharge: HOME | End: 2024-07-26
Attending: INTERNAL MEDICINE
Payer: COMMERCIAL

## 2024-07-26 DIAGNOSIS — C64.2 CANCER OF LEFT KIDNEY (HCC): ICD-10-CM

## 2024-07-26 DIAGNOSIS — C81.41: ICD-10-CM

## 2024-07-26 DIAGNOSIS — R59.0 POSTERIOR CERVICAL LYMPHADENOPATHY: ICD-10-CM

## 2024-07-26 LAB — GLUCOSE BLD-MCNC: 102 MG/DL (ref 70–99)

## 2024-07-26 PROCEDURE — 82962 GLUCOSE BLOOD TEST: CPT

## 2024-07-26 PROCEDURE — 78815 PET IMAGE W/CT SKULL-THIGH: CPT | Performed by: INTERNAL MEDICINE

## 2024-08-06 NOTE — CONSULTS
[facility]        Patient Name:  Patel Montalvo   YOB: 1965   Gender:  Male   Appt Date:  8/7/2024   Provider:  Rafi Harley MD     PATIENT PROVIDERS  Referring: Sreekanth Vidal MD    Primary Care Provider:Haylee Núñez DO   Address: [address] [place] [address]   Phone #: [phone]       CHIEF COMPLAINT  New Consult: L posterior neck lymph node     PROBLEMS  Reviewed   Patient Active Problem List   Diagnosis    Acute sinusitis, unspecified    Acute bronchitis    Acute pharyngitis    Screening for thyroid disorder    Screening for other and unspecified endocrine, nutritional, metabolic, and immunity disorders    Screening for other and unspecified genitourinary condition    Hypertriglyceridemia    Cough    Hypertrophy of tonsil    Chronic tonsillitis    History of iron deficiency    Prediabetes    Alpha thalassemia silent carrier    Low HDL (under 40)    Vitamin D deficiency    Hodgkin lymphoma (HCC)    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 Patient has history of hodgkin lymphoma s/p chemo/RT finished in 2020 and Left RCC: Grade I s/p RFA on 04/14/2021.       07/15/2024: Saw PCP Dr. Haylee Núñez for general wellness exam ordered--> US/Head and Neck: Palpable lump left side of the neck corresponds to an 8 x 6 x 3 mm lymph node.     07/19/2024: Patient seen by Dr. Vidal for posterior neck lymph node and ultrasound findings--> Recommended PET scan.    07/26/2024: PET scan--> Multiple small nodes within the neck, axilla, abdomen and pelvis which demonstrate increased uptake most consistent with lymphoma given  the history.      Vital Signs:  /88 (BP Location: Right arm, Patient Position: Sitting, Cuff Size: adult)   Pulse 64   Temp 97.7 °F (36.5 °C) (Temporal)   Resp 20   Ht 1.791 m (5' 10.5\")   Wt 76.6 kg (168 lb 12.8 oz)   BMI 23.88 kg/m²      Medications Reviewed:    Current Outpatient Medications:     levothyroxine 25 MCG Oral Tab, Take 1 tablet (25 mcg total) by mouth before breakfast., Disp: 30 tablet, Rfl: 2    cyclobenzaprine 10 MG Oral Tab, Take 1 tablet (10 mg total) by mouth 3 (three) times daily as needed for Muscle spasms., Disp: 15 tablet, Rfl: 0    niacin 100 MG Oral Tab, Take 1 tablet (100 mg total) by mouth nightly., Disp: , Rfl:     Cholecalciferol (VITAMIN D) 50 MCG (2000 UT) Oral Cap, Take by mouth., Disp: , Rfl:      Allergies Reviewed:  Allergies   Allergen Reactions    Compazine [Prochlorperazine] ANXIETY and Tightness in Chest        History:  Reviewed:  Past Medical History:    Back problem    Cancer (HCC)    lymphoma    Exposure to medical diagnostic radiation    10/2020    Hyperlipidemia    Personal history of antineoplastic chemotherapy    10/2020    Visual impairment    glasses    Vitamin D deficiency      Reviewed:  Past Surgical History:   Procedure Laterality Date    Appendectomy  age 19    Biopsy Left 06/2020    left neck LN biopsy    Colonoscopy  05/2021    Other Right     portacath placement    Removal of tonsils,12+ y/o N/A 5/27/2016    Procedure: TONSILLECTOMY;  Surgeon: Sreekanth Daniels MD;  Location: [facility]      Reviewed Social History:  Social History     Socioeconomic History    Marital status:     Number of children: 2   Occupational History    Occupation:    Tobacco Use    Smoking status: Never    Smokeless tobacco: Never   Vaping Use    Vaping status: Never Used   Substance and Sexual Activity    Alcohol use: Not Currently    Drug use: No    Sexual activity: Yes     Partners: Female   Other Topics Concern    Caffeine Concern Yes     Comment:  2 cups of tea dly    Exercise Yes     Comment: 2 x week   Social History Narrative    , lives at home with wife    Has 2 daughters (19 and 17) - one at [employer], 1 is a senior in     On disability at this time      Reviewed:  Family History   Problem Relation Age of Onset    Breast Cancer Maternal Grandmother 53    Blood Disorder Maternal Grandmother         cerebral artery aneurysm    Arthritis Maternal Grandmother         osteoarthritis    Cancer Daughter         burkitt lymphoma - intraabdominal or soft tissues    Seizure Disorder Father         epilepsy      Review of Systems:  GENERAL HEALTH: feels well, no fatigue.   HEENT: denies pain  RESPIRATORY: denies shortness of breath, wheezing or cough   CARDIOVASCULAR: denies chest pain, SOB, edema,orthopnea, no palpitations   GI: denies nausea, vomiting, constipation, diarrhea; no rectal bleeding  GENITAL/: no blood in urine  MUSCULOSKELETAL: no joint complaints, no back pain  NEURO: no tingling, numbness, weakness  ENDOCRINE: denies weight loss/gain  PSYCH: no mood changes       Physical Examination:  Constitutional: NAD.   Eyes: Sclera: non-icteric.   Lymph Nodes: Lymph Nodes no right cervical LAD, supraclavicular LAD, axillary LAD, or inguinal LAD.  Left lateral less than 1 cm cervical lymphadenopathy.  Lungs: Auscultation: breath sounds normal.   Cardiovascular: Heart Auscultation: RRR.   Abdomen: Soft, no masses  Musculoskeletal: Extremities: no edema.   Skin: Inspection and palpation: no jaundice.      Document Review:  As documented above.        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  We are being requested to perform right axillary lymph node biopsy.  The surgical treatment matter including indications, rationale, and risks was discussed in detail.  Patient agreed and understood.  Arrangements will be made to schedule  the procedure.  Patient had ample time to ask questions.     Cancer of left kidney   Left Renal Cell Carcinoma: Grade I s/p RFA on 04/14/2021    Screening  Last colonoscopy: May 25th, 2021 due in 10 years.         Follow Up:  To schedule surgery.       Electronically Signed by: Rafi Harley MD

## 2024-08-06 NOTE — H&P (VIEW-ONLY)
[facility]        Patient Name:  Patel Montalvo   YOB: 1965   Gender:  Male   Appt Date:  8/7/2024   Provider:  Rafi Harley MD     PATIENT PROVIDERS  Referring: Sreekanth Vidal MD    Primary Care Provider:Haylee Núñez DO   Address: [address] [place] [address]   Phone #: [phone]       CHIEF COMPLAINT  New Consult: L posterior neck lymph node     PROBLEMS  Reviewed   Patient Active Problem List   Diagnosis    Acute sinusitis, unspecified    Acute bronchitis    Acute pharyngitis    Screening for thyroid disorder    Screening for other and unspecified endocrine, nutritional, metabolic, and immunity disorders    Screening for other and unspecified genitourinary condition    Hypertriglyceridemia    Cough    Hypertrophy of tonsil    Chronic tonsillitis    History of iron deficiency    Prediabetes    Alpha thalassemia silent carrier    Low HDL (under 40)    Vitamin D deficiency    Hodgkin lymphoma (HCC)    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 Patient has history of hodgkin lymphoma s/p chemo/RT finished in 2020 and Left RCC: Grade I s/p RFA on 04/14/2021.       07/15/2024: Saw PCP Dr. Hyalee Núñez for general wellness exam ordered--> US/Head and Neck: Palpable lump left side of the neck corresponds to an 8 x 6 x 3 mm lymph node.     07/19/2024: Patient seen by Dr. Vidal for posterior neck lymph node and ultrasound findings--> Recommended PET scan.    07/26/2024: PET scan--> Multiple small nodes within the neck, axilla, abdomen and pelvis which demonstrate increased uptake most consistent with lymphoma given  the history.      Vital Signs:  /88 (BP Location: Right arm, Patient Position: Sitting, Cuff Size: adult)   Pulse 64   Temp 97.7 °F (36.5 °C) (Temporal)   Resp 20   Ht 1.791 m (5' 10.5\")   Wt 76.6 kg (168 lb 12.8 oz)   BMI 23.88 kg/m²      Medications Reviewed:    Current Outpatient Medications:     levothyroxine 25 MCG Oral Tab, Take 1 tablet (25 mcg total) by mouth before breakfast., Disp: 30 tablet, Rfl: 2    cyclobenzaprine 10 MG Oral Tab, Take 1 tablet (10 mg total) by mouth 3 (three) times daily as needed for Muscle spasms., Disp: 15 tablet, Rfl: 0    niacin 100 MG Oral Tab, Take 1 tablet (100 mg total) by mouth nightly., Disp: , Rfl:     Cholecalciferol (VITAMIN D) 50 MCG (2000 UT) Oral Cap, Take by mouth., Disp: , Rfl:      Allergies Reviewed:  Allergies   Allergen Reactions    Compazine [Prochlorperazine] ANXIETY and Tightness in Chest        History:  Reviewed:  Past Medical History:    Back problem    Cancer (HCC)    lymphoma    Exposure to medical diagnostic radiation    10/2020    Hyperlipidemia    Personal history of antineoplastic chemotherapy    10/2020    Visual impairment    glasses    Vitamin D deficiency      Reviewed:  Past Surgical History:   Procedure Laterality Date    Appendectomy  age 19    Biopsy Left 06/2020    left neck LN biopsy    Colonoscopy  05/2021    Other Right     portacath placement    Removal of tonsils,12+ y/o N/A 5/27/2016    Procedure: TONSILLECTOMY;  Surgeon: Sreekanth Daniels MD;  Location: [facility]      Reviewed Social History:  Social History     Socioeconomic History    Marital status:     Number of children: 2   Occupational History    Occupation:    Tobacco Use    Smoking status: Never    Smokeless tobacco: Never   Vaping Use    Vaping status: Never Used   Substance and Sexual Activity    Alcohol use: Not Currently    Drug use: No    Sexual activity: Yes     Partners: Female   Other Topics Concern    Caffeine Concern Yes     Comment:  2 cups of tea dly    Exercise Yes     Comment: 2 x week   Social History Narrative    , lives at home with wife    Has 2 daughters (19 and 17) - one at [employer], 1 is a senior in     On disability at this time      Reviewed:  Family History   Problem Relation Age of Onset    Breast Cancer Maternal Grandmother 53    Blood Disorder Maternal Grandmother         cerebral artery aneurysm    Arthritis Maternal Grandmother         osteoarthritis    Cancer Daughter         burkitt lymphoma - intraabdominal or soft tissues    Seizure Disorder Father         epilepsy      Review of Systems:  GENERAL HEALTH: feels well, no fatigue.   HEENT: denies pain  RESPIRATORY: denies shortness of breath, wheezing or cough   CARDIOVASCULAR: denies chest pain, SOB, edema,orthopnea, no palpitations   GI: denies nausea, vomiting, constipation, diarrhea; no rectal bleeding  GENITAL/: no blood in urine  MUSCULOSKELETAL: no joint complaints, no back pain  NEURO: no tingling, numbness, weakness  ENDOCRINE: denies weight loss/gain  PSYCH: no mood changes       Physical Examination:  Constitutional: NAD.   Eyes: Sclera: non-icteric.   Lymph Nodes: Lymph Nodes no right cervical LAD, supraclavicular LAD, axillary LAD, or inguinal LAD.  Left lateral less than 1 cm cervical lymphadenopathy.  Lungs: Auscultation: breath sounds normal.   Cardiovascular: Heart Auscultation: RRR.   Abdomen: Soft, no masses  Musculoskeletal: Extremities: no edema.   Skin: Inspection and palpation: no jaundice.      Document Review:  As documented above.        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  We are being requested to perform right axillary lymph node biopsy.  The surgical treatment matter including indications, rationale, and risks was discussed in detail.  Patient agreed and understood.  Arrangements will be made to schedule  the procedure.  Patient had ample time to ask questions.     Cancer of left kidney   Left Renal Cell Carcinoma: Grade I s/p RFA on 04/14/2021    Screening  Last colonoscopy: May 25th, 2021 due in 10 years.         Follow Up:  To schedule surgery.       Electronically Signed by: Rafi Harley MD

## 2024-08-07 ENCOUNTER — OFFICE VISIT (OUTPATIENT)
Dept: SURGERY | Facility: CLINIC | Age: 59
End: 2024-08-07
Payer: COMMERCIAL

## 2024-08-07 ENCOUNTER — EKG ENCOUNTER (OUTPATIENT)
Dept: LAB | Age: 59
End: 2024-08-07
Attending: SURGERY
Payer: COMMERCIAL

## 2024-08-07 VITALS
WEIGHT: 168.81 LBS | HEART RATE: 64 BPM | RESPIRATION RATE: 20 BRPM | SYSTOLIC BLOOD PRESSURE: 138 MMHG | HEIGHT: 70.5 IN | BODY MASS INDEX: 23.9 KG/M2 | DIASTOLIC BLOOD PRESSURE: 88 MMHG | TEMPERATURE: 98 F

## 2024-08-07 DIAGNOSIS — Z01.818 PRE-OP TESTING: ICD-10-CM

## 2024-08-07 DIAGNOSIS — C81.41: Primary | ICD-10-CM

## 2024-08-07 DIAGNOSIS — C64.2 CANCER OF LEFT KIDNEY (HCC): ICD-10-CM

## 2024-08-07 PROCEDURE — 93010 ELECTROCARDIOGRAM REPORT: CPT | Performed by: INTERNAL MEDICINE

## 2024-08-07 PROCEDURE — 93005 ELECTROCARDIOGRAM TRACING: CPT

## 2024-08-07 PROCEDURE — 3079F DIAST BP 80-89 MM HG: CPT | Performed by: SURGERY

## 2024-08-07 PROCEDURE — 99245 OFF/OP CONSLTJ NEW/EST HI 55: CPT | Performed by: SURGERY

## 2024-08-07 PROCEDURE — 3008F BODY MASS INDEX DOCD: CPT | Performed by: SURGERY

## 2024-08-07 PROCEDURE — 3075F SYST BP GE 130 - 139MM HG: CPT | Performed by: SURGERY

## 2024-08-07 NOTE — PATIENT INSTRUCTIONS
Surgery:  Right axillary lymph node biopsy    Date of Surgery:  8/20/24    Hospital:    [address] [address]  Phone: [phone]    This is an Outpatient procedure.  Use the provided Chlorhexadine surgical soap(instructions attached) to shower the night before and morning of your procedure.  Do not apply powders, creams, lotions or deodorant after showering.  Do not apply any kind of makeup and make sure to remove nail polish prior to your surgery.  For faster recovery from anesthesia and surgery please follow the instructions below regarding your pre-op diet:  12 hours prior to your surgery time you are to drink one 10oz bottle of Ensure Pre-Surgery Drink. You are to have NO solid food or water after 11pm the night before your surgery EXCEPT one additional 10oz bottle of Ensure Pre-Surgery Drink. You need to finish drinking this 4 hours prior to surgery time.  Take Tylenol 1000mg by mouth at the time of your second Ensure Pre-Surgery drink(4hrs prior to surgery time), unless instructed otherwise by your surgeon.   Bowel Prep: No   If you take Insulin contact your primary care physician for specific instructions regarding dosing around your surgery.  Do not drink alcohol or smoke tobacco products 24 hours prior to procedure.   Bring your picture ID and insurance card with you.  Wear comfortable clothing that can easily be removed. Preferably, something that zips, snaps, or buttons up the front.   You will be contacted by the hospital  for pre-admission COVID-19 testing and the day prior to your surgery to confirm details and give you specific instructions about when and where to arrive the day of your procedure.   If you are taking blood thinners including: Plavix, Eliquis, Xarelto, Coumadin, full strength Aspirin you will need to contact the prescribing provider for specific instructions on holding these medications for your procedure.  Inform your primary care physician  of your surgery and ask if him/her will need to see you prior to surgery.  If you develop symptoms of another illness prior to surgery please contact our office immediately.   If this is an inpatient surgery, attending the Surgical Oncology Pre-operative Education Class is strongly encouraged. Email [email or web address] to RSVP or for more class information.       Pre-Operative Testing  x CBC x CMP  BMP    PT, PTT, INR  UA x EKG    Chest X-Ray  Cardiac Clearance  H & P Medical Clearance     Rafi Harley MD, FACS  Chief of Surgical Oncology  Co-Medical Director, Oncology Services  Professor of Surgery    For Dr. 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 Rafi Harley    LOCATION: [facility]    Type: Outpatient    Length of surgery: 30 Minutes  Anesthesia:general  Joint case with:  SA:  No   Special Equipment:   Special request:     Allergies:   Allergies   Allergen Reactions    Compazine [Prochlorperazine] ANXIETY and Tightness in Chest       Orders:  No  -Colon Bundle/Bowel Prep  No  -Type and cross 2 units PRBC  No  -Type and Screen  No  -Advanced Oncology Order Set (HIPEC)  Yes-Heparin Pre-Op  No  -Nuclear Med Injection  No  -Wire localization needed  No  -Midline placement (HIPIC, Whipple, Esophagectomy, Liver)  Yes-Tylenol administration prior to surgery  Does patient have a pacemaker?: No    Yes-CHG Cloths (Gómez)    P.A.T. orders: CBC, CMP, and EKG     For RN use only:

## 2024-08-08 LAB
ATRIAL RATE: 54 BPM
P AXIS: 32 DEGREES
P-R INTERVAL: 158 MS
Q-T INTERVAL: 434 MS
QRS DURATION: 88 MS
QTC CALCULATION (BEZET): 411 MS
R AXIS: 11 DEGREES
T AXIS: 16 DEGREES
VENTRICULAR RATE: 54 BPM

## 2024-08-20 ENCOUNTER — ANESTHESIA EVENT (OUTPATIENT)
Dept: SURGERY | Facility: HOSPITAL | Age: 59
End: 2024-08-20
Payer: COMMERCIAL

## 2024-08-20 ENCOUNTER — HOSPITAL ENCOUNTER (OUTPATIENT)
Facility: HOSPITAL | Age: 59
Setting detail: HOSPITAL OUTPATIENT SURGERY
Discharge: HOME | End: 2024-08-20
Attending: SURGERY | Admitting: SURGERY
Payer: COMMERCIAL

## 2024-08-20 ENCOUNTER — ANESTHESIA (OUTPATIENT)
Dept: SURGERY | Facility: HOSPITAL | Age: 59
End: 2024-08-20
Payer: COMMERCIAL

## 2024-08-20 VITALS
TEMPERATURE: 98 F | HEART RATE: 78 BPM | WEIGHT: 170 LBS | SYSTOLIC BLOOD PRESSURE: 150 MMHG | BODY MASS INDEX: 23.8 KG/M2 | DIASTOLIC BLOOD PRESSURE: 95 MMHG | OXYGEN SATURATION: 98 % | HEIGHT: 71 IN | RESPIRATION RATE: 16 BRPM

## 2024-08-20 DIAGNOSIS — C81.41: ICD-10-CM

## 2024-08-20 PROCEDURE — 87102 FUNGUS ISOLATION CULTURE: CPT | Performed by: SURGERY

## 2024-08-20 PROCEDURE — 87206 SMEAR FLUORESCENT/ACID STAI: CPT | Performed by: SURGERY

## 2024-08-20 PROCEDURE — 07B50ZX EXCISION OF RIGHT AXILLARY LYMPHATIC, OPEN APPROACH, DIAGNOSTIC: ICD-10-PCS | Performed by: SURGERY

## 2024-08-20 PROCEDURE — 88342 IMHCHEM/IMCYTCHM 1ST ANTB: CPT | Performed by: SURGERY

## 2024-08-20 PROCEDURE — 88341 IMHCHEM/IMCYTCHM EA ADD ANTB: CPT | Performed by: SURGERY

## 2024-08-20 PROCEDURE — 87116 MYCOBACTERIA CULTURE: CPT | Performed by: SURGERY

## 2024-08-20 PROCEDURE — 88185 FLOWCYTOMETRY/TC ADD-ON: CPT | Performed by: SURGERY

## 2024-08-20 PROCEDURE — 88333 PATH CONSLTJ SURG CYTO XM 1: CPT | Performed by: SURGERY

## 2024-08-20 PROCEDURE — 81264 IGK REARRANGEABN CLONAL POP: CPT | Performed by: SURGERY

## 2024-08-20 PROCEDURE — 88184 FLOWCYTOMETRY/ TC 1 MARKER: CPT | Performed by: SURGERY

## 2024-08-20 PROCEDURE — 81261 IGH GENE REARRANGE AMP METH: CPT | Performed by: SURGERY

## 2024-08-20 PROCEDURE — 88307 TISSUE EXAM BY PATHOLOGIST: CPT | Performed by: SURGERY

## 2024-08-20 RX ORDER — MIDAZOLAM HYDROCHLORIDE 1 MG/ML
INJECTION INTRAMUSCULAR; INTRAVENOUS AS NEEDED
Status: DISCONTINUED | OUTPATIENT
Start: 2024-08-20 | End: 2024-08-20 | Stop reason: SURG

## 2024-08-20 RX ORDER — CEFAZOLIN SODIUM 1 G/3ML
INJECTION, POWDER, FOR SOLUTION INTRAMUSCULAR; INTRAVENOUS AS NEEDED
Status: DISCONTINUED | OUTPATIENT
Start: 2024-08-20 | End: 2024-08-20 | Stop reason: SURG

## 2024-08-20 RX ORDER — HYDROCODONE BITARTRATE AND ACETAMINOPHEN 5; 325 MG/1; MG/1
2 TABLET ORAL ONCE AS NEEDED
Status: COMPLETED | OUTPATIENT
Start: 2024-08-20 | End: 2024-08-20

## 2024-08-20 RX ORDER — ACETAMINOPHEN 500 MG
1000 TABLET ORAL ONCE AS NEEDED
Status: COMPLETED | OUTPATIENT
Start: 2024-08-20 | End: 2024-08-20

## 2024-08-20 RX ORDER — TRAMADOL HYDROCHLORIDE 50 MG/1
TABLET ORAL EVERY 6 HOURS PRN
Qty: 20 TABLET | Refills: 0 | Status: SHIPPED | OUTPATIENT
Start: 2024-08-20

## 2024-08-20 RX ORDER — ACETAMINOPHEN 500 MG
1000 TABLET ORAL ONCE
Status: DISCONTINUED | OUTPATIENT
Start: 2024-08-20 | End: 2024-08-20 | Stop reason: HOSPADM

## 2024-08-20 RX ORDER — SODIUM CHLORIDE, SODIUM LACTATE, POTASSIUM CHLORIDE, CALCIUM CHLORIDE 600; 310; 30; 20 MG/100ML; MG/100ML; MG/100ML; MG/100ML
INJECTION, SOLUTION INTRAVENOUS CONTINUOUS
Status: DISCONTINUED | OUTPATIENT
Start: 2024-08-20 | End: 2024-08-20

## 2024-08-20 RX ORDER — ONDANSETRON 2 MG/ML
INJECTION INTRAMUSCULAR; INTRAVENOUS AS NEEDED
Status: DISCONTINUED | OUTPATIENT
Start: 2024-08-20 | End: 2024-08-20 | Stop reason: SURG

## 2024-08-20 RX ORDER — ONDANSETRON 4 MG/1
4 TABLET, ORALLY DISINTEGRATING ORAL EVERY 8 HOURS PRN
Qty: 10 TABLET | Refills: 0 | Status: SHIPPED | OUTPATIENT
Start: 2024-08-20

## 2024-08-20 RX ORDER — HYDROMORPHONE HYDROCHLORIDE 1 MG/ML
INJECTION, SOLUTION INTRAMUSCULAR; INTRAVENOUS; SUBCUTANEOUS
Status: COMPLETED
Start: 2024-08-20 | End: 2024-08-20

## 2024-08-20 RX ORDER — ONDANSETRON 2 MG/ML
4 INJECTION INTRAMUSCULAR; INTRAVENOUS EVERY 6 HOURS PRN
Status: DISCONTINUED | OUTPATIENT
Start: 2024-08-20 | End: 2024-08-20

## 2024-08-20 RX ORDER — HEPARIN SODIUM 5000 [USP'U]/ML
5000 INJECTION, SOLUTION INTRAVENOUS; SUBCUTANEOUS ONCE
Status: COMPLETED | OUTPATIENT
Start: 2024-08-20 | End: 2024-08-20

## 2024-08-20 RX ORDER — NALOXONE HYDROCHLORIDE 0.4 MG/ML
0.08 INJECTION, SOLUTION INTRAMUSCULAR; INTRAVENOUS; SUBCUTANEOUS AS NEEDED
Status: DISCONTINUED | OUTPATIENT
Start: 2024-08-20 | End: 2024-08-20

## 2024-08-20 RX ORDER — ALBUTEROL SULFATE 90 UG/1
AEROSOL, METERED RESPIRATORY (INHALATION) AS NEEDED
Status: DISCONTINUED | OUTPATIENT
Start: 2024-08-20 | End: 2024-08-20 | Stop reason: SURG

## 2024-08-20 RX ORDER — HYDROMORPHONE HYDROCHLORIDE 1 MG/ML
0.4 INJECTION, SOLUTION INTRAMUSCULAR; INTRAVENOUS; SUBCUTANEOUS EVERY 5 MIN PRN
Status: DISCONTINUED | OUTPATIENT
Start: 2024-08-20 | End: 2024-08-20

## 2024-08-20 RX ORDER — LABETALOL HYDROCHLORIDE 5 MG/ML
5 INJECTION, SOLUTION INTRAVENOUS EVERY 5 MIN PRN
Status: DISCONTINUED | OUTPATIENT
Start: 2024-08-20 | End: 2024-08-20

## 2024-08-20 RX ORDER — PHENYLEPHRINE HCL 10 MG/ML
VIAL (ML) INJECTION AS NEEDED
Status: DISCONTINUED | OUTPATIENT
Start: 2024-08-20 | End: 2024-08-20 | Stop reason: SURG

## 2024-08-20 RX ORDER — HYDROMORPHONE HYDROCHLORIDE 1 MG/ML
0.6 INJECTION, SOLUTION INTRAMUSCULAR; INTRAVENOUS; SUBCUTANEOUS EVERY 5 MIN PRN
Status: DISCONTINUED | OUTPATIENT
Start: 2024-08-20 | End: 2024-08-20

## 2024-08-20 RX ORDER — MEPERIDINE HYDROCHLORIDE 25 MG/ML
12.5 INJECTION INTRAMUSCULAR; INTRAVENOUS; SUBCUTANEOUS AS NEEDED
Status: DISCONTINUED | OUTPATIENT
Start: 2024-08-20 | End: 2024-08-20

## 2024-08-20 RX ORDER — DEXAMETHASONE SODIUM PHOSPHATE 4 MG/ML
VIAL (ML) INJECTION AS NEEDED
Status: DISCONTINUED | OUTPATIENT
Start: 2024-08-20 | End: 2024-08-20 | Stop reason: SURG

## 2024-08-20 RX ORDER — LIDOCAINE HYDROCHLORIDE 10 MG/ML
INJECTION, SOLUTION EPIDURAL; INFILTRATION; INTRACAUDAL; PERINEURAL AS NEEDED
Status: DISCONTINUED | OUTPATIENT
Start: 2024-08-20 | End: 2024-08-20 | Stop reason: SURG

## 2024-08-20 RX ORDER — EPHEDRINE SULFATE 50 MG/ML
INJECTION INTRAVENOUS AS NEEDED
Status: DISCONTINUED | OUTPATIENT
Start: 2024-08-20 | End: 2024-08-20 | Stop reason: SURG

## 2024-08-20 RX ORDER — SCOLOPAMINE TRANSDERMAL SYSTEM 1 MG/1
1 PATCH, EXTENDED RELEASE TRANSDERMAL ONCE
Status: DISCONTINUED | OUTPATIENT
Start: 2024-08-20 | End: 2024-08-20 | Stop reason: HOSPADM

## 2024-08-20 RX ORDER — ROCURONIUM BROMIDE 10 MG/ML
INJECTION, SOLUTION INTRAVENOUS AS NEEDED
Status: DISCONTINUED | OUTPATIENT
Start: 2024-08-20 | End: 2024-08-20 | Stop reason: SURG

## 2024-08-20 RX ORDER — BUPIVACAINE HYDROCHLORIDE 2.5 MG/ML
INJECTION, SOLUTION EPIDURAL; INFILTRATION; INTRACAUDAL AS NEEDED
Status: DISCONTINUED | OUTPATIENT
Start: 2024-08-20 | End: 2024-08-20 | Stop reason: HOSPADM

## 2024-08-20 RX ORDER — MIDAZOLAM HYDROCHLORIDE 1 MG/ML
1 INJECTION INTRAMUSCULAR; INTRAVENOUS EVERY 5 MIN PRN
Status: DISCONTINUED | OUTPATIENT
Start: 2024-08-20 | End: 2024-08-20

## 2024-08-20 RX ORDER — HYDROMORPHONE HYDROCHLORIDE 1 MG/ML
0.2 INJECTION, SOLUTION INTRAMUSCULAR; INTRAVENOUS; SUBCUTANEOUS EVERY 5 MIN PRN
Status: DISCONTINUED | OUTPATIENT
Start: 2024-08-20 | End: 2024-08-20

## 2024-08-20 RX ORDER — IBUPROFEN 600 MG/1
600 TABLET, FILM COATED ORAL ONCE AS NEEDED
Status: DISCONTINUED | OUTPATIENT
Start: 2024-08-20 | End: 2024-08-20

## 2024-08-20 RX ORDER — HYDROCODONE BITARTRATE AND ACETAMINOPHEN 5; 325 MG/1; MG/1
1 TABLET ORAL ONCE AS NEEDED
Status: COMPLETED | OUTPATIENT
Start: 2024-08-20 | End: 2024-08-20

## 2024-08-20 RX ADMIN — ONDANSETRON 4 MG: 2 INJECTION INTRAMUSCULAR; INTRAVENOUS at 14:29:00

## 2024-08-20 RX ADMIN — DEXAMETHASONE SODIUM PHOSPHATE 4 MG: 4 MG/ML VIAL (ML) INJECTION at 15:07:00

## 2024-08-20 RX ADMIN — ALBUTEROL SULFATE 8 PUFF: 90 AEROSOL, METERED RESPIRATORY (INHALATION) at 14:55:00

## 2024-08-20 RX ADMIN — PHENYLEPHRINE HCL 100 MCG: 10 MG/ML VIAL (ML) INJECTION at 15:14:00

## 2024-08-20 RX ADMIN — SODIUM CHLORIDE, SODIUM LACTATE, POTASSIUM CHLORIDE, CALCIUM CHLORIDE: 600; 310; 30; 20 INJECTION, SOLUTION INTRAVENOUS at 14:12:00

## 2024-08-20 RX ADMIN — SODIUM CHLORIDE, SODIUM LACTATE, POTASSIUM CHLORIDE, CALCIUM CHLORIDE: 600; 310; 30; 20 INJECTION, SOLUTION INTRAVENOUS at 15:46:00

## 2024-08-20 RX ADMIN — SODIUM CHLORIDE, SODIUM LACTATE, POTASSIUM CHLORIDE, CALCIUM CHLORIDE: 600; 310; 30; 20 INJECTION, SOLUTION INTRAVENOUS at 15:50:00

## 2024-08-20 RX ADMIN — EPHEDRINE SULFATE 5 MG: 50 INJECTION INTRAVENOUS at 15:18:00

## 2024-08-20 RX ADMIN — CEFAZOLIN SODIUM 2 G: 1 INJECTION, POWDER, FOR SOLUTION INTRAMUSCULAR; INTRAVENOUS at 14:32:00

## 2024-08-20 RX ADMIN — ROCURONIUM BROMIDE 40 MG: 10 INJECTION, SOLUTION INTRAVENOUS at 14:58:00

## 2024-08-20 RX ADMIN — PHENYLEPHRINE HCL 100 MCG: 10 MG/ML VIAL (ML) INJECTION at 15:18:00

## 2024-08-20 RX ADMIN — MIDAZOLAM HYDROCHLORIDE 2 MG: 1 INJECTION INTRAMUSCULAR; INTRAVENOUS at 14:13:00

## 2024-08-20 RX ADMIN — LIDOCAINE HYDROCHLORIDE 50 MG: 10 INJECTION, SOLUTION EPIDURAL; INFILTRATION; INTRACAUDAL; PERINEURAL at 14:21:00

## 2024-08-20 RX ADMIN — DEXAMETHASONE SODIUM PHOSPHATE 4 MG: 4 MG/ML VIAL (ML) INJECTION at 14:28:00

## 2024-08-20 NOTE — ANESTHESIA PREPROCEDURE EVALUATION
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  Temp: 98 °F (36.7 °C)  Pulse: 60  Resp: 18  BP: 138/91  SpO2: 98 %  Body mass index is 23.71 kg/m².    Current medications reviewed.  Hospital Medications:   [Transfer Hold] acetaminophen (Tylenol Extra Strength) tab 1,000 mg  1,000 mg Oral Once    [Transfer Hold] scopolamine (Transderm-Scop) 1 MG/3DAYS patch 1 patch  1 patch Transdermal Once    lactated ringers infusion   Intravenous Continuous    [COMPLETED] heparin (Porcine) 5000 UNIT/ML injection 5,000 Units  5,000 Units Subcutaneous Once    ceFAZolin (Ancef) 2g in 10mL IV syringe premix  2 g Intravenous Once       Outpatient Medications:     Medications Prior to Admission   Medication Sig Dispense Refill Last Dose    levothyroxine 25 MCG Oral Tab Take 1 tablet (25 mcg total) by mouth before breakfast. 30 tablet 2 2024    niacin 100 MG Oral Tab Take 1 tablet (100 mg total) by mouth nightly.   2024    Cholecalciferol (VITAMIN D) 50 MCG (2000 UT) Oral Cap Take by mouth.   2024    [] naproxen 500 MG Oral Tab Take 1 tablet (500 mg total) by mouth 2 (two) times daily with meals for 10 days. (Patient not taking: Reported on 2024) 20 tablet 0 Not Taking       Allergies: Compazine [prochlorperazine]      Anesthesia Evaluation        Anesthetic Complications           GI/Hepatic/Renal    Negative GI/hepatic/renal ROS.                             Cardiovascular      ECG reviewed.  Exercise tolerance: good     MET: >4         (+) hyperlipidemia                                  Endo/Other    Negative endo/other ROS.                               Pulmonary    Negative pulmonary ROS.                       Neuro/Psych    Negative neuro/psych ROS.                          Hodgkin's lympoma s/p chemo and RT    7/2020 ECHO findings:  Conclusions:     1. Left ventricle: Global longitudinal strain (GLS) was normal. Global longitudinal strain(GLS) was -20.3%. The cavity size was normal. Wall   thickness was normal. The estimated ejection fraction was 60-65%. There   was no diagnostic evidence for regional wall motion abnormalities. Left   ventricular diastolic function parameters were normal.   2. Mitral valve: Mildly calcified annulus. There was mild regurgitation.   3. Left atrium: The left atrial volume was normal.     Impressions:  No previous study was available for comparison.   *           Past Surgical History:   Procedure Laterality Date    Appendectomy  age 19    Biopsy Left 06/2020    left neck LN biopsy    Colonoscopy  05/2021    Other Right     portacath placement    Removal of tonsils,14+ y/o N/A 5/27/2016    Procedure: TONSILLECTOMY;  Surgeon: Sreekanth Daniels MD;  Location: [facility]     Social History     Socioeconomic History    Marital status:     Number of children: 2   Occupational History    Occupation:    Tobacco Use    Smoking status: Never    Smokeless tobacco: Never   Vaping Use    Vaping status: Never Used   Substance and Sexual Activity    Alcohol use: Not Currently    Drug use: No    Sexual activity: Yes     Partners: Female   Other Topics Concern    Caffeine Concern Yes     Comment: 2 cups of tea dly    Exercise Yes     Comment: 2 x week     History   Drug Use No     Available pre-op labs reviewed.  Lab Results   Component Value Date    WBC 5.9 07/15/2024    RBC 5.04 07/15/2024    HGB 13.5 07/15/2024    HCT 40.2 07/15/2024    MCV 79.8 (L) 07/15/2024    MCH 26.8 07/15/2024    MCHC 33.6 07/15/2024    RDW 14.8 07/15/2024    .0 07/15/2024     Lab Results   Component Value Date      07/15/2024    K 3.9 07/15/2024     07/15/2024    CO2 24.0 07/15/2024    BUN 11 07/15/2024    CREATSERUM 1.20 07/15/2024    GLU 99 07/15/2024    CA 9.2 07/15/2024            Airway      Mallampati: II  Mouth opening: >3 FB  TM distance: > 6 cm  Neck ROM: full Cardiovascular    Cardiovascular exam normal.         Dental             Pulmonary    Pulmonary exam normal.                 Other findings  Dentition grossly normal.            ASA: 2   Plan: general  NPO status verified and           Plan/risks discussed with: patient                Present on Admission:  **None**

## 2024-08-20 NOTE — INTERVAL H&P NOTE
There has been no significant change since Dr Harley saw patient as documented in EPIC.   Surgery revisted.   To proceed as planned.      Patient seen and examined by MICHELLE Zaragoza

## 2024-08-20 NOTE — ANESTHESIA POSTPROCEDURE EVALUATION
[facility]    Patel Montalvo Patient Status:  Hospital Outpatient Surgery   Age/Gender 59 year old male MRN [id number]   Location [facility] POST ANESTHESIA CARE UNIT Attending Rafi Harley MD   Hosp Day # 0 PCP Haylee Núñez DO       Anesthesia Post-op Note    Exision right axillary lymph nodes, intraoperative ultrasound.    Procedure Summary       Date: 08/20/24 Room / Location:  MAIN OR 06 /  MAIN OR    Anesthesia Start: 1412 Anesthesia Stop: 1601    Procedure: Exision right axillary lymph nodes, intraoperative ultrasound. 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    Patient Location: PACU    Anesthesia Type: general    Airway Patency: patent and extubated    Postop Pain Control: adequate    Mental Status: preanesthetic baseline    Nausea/Vomiting: none    Cardiopulmonary/Hydration status: stable euvolemic    Complications: no apparent anesthesia related complications    Postop vital signs: stable    Comments: Report to [facility] PACU RN.    Dental Exam: Unchanged from Preop    Patient to be discharged from PACU when criteria met.

## 2024-08-20 NOTE — ANESTHESIA PROCEDURE NOTES
Airway  Date/Time: 8/20/2024 3:04 PM  Urgency: elective    Airway not difficult    General Information and Staff    Patient location during procedure: OR  Anesthesiologist: Eloy Orosco MD  Resident/CRNA: Jacqueline Vazquez CRNA  Performed: CRNA and anesthesiologist   Performed by: Jacqueline Vazquez CRNA  Authorized by: Eloy Orosco MD      Indications and Patient Condition  Indications for airway management: anesthesia  Spontaneous Ventilation: absent  Sedation level: deep  Preoxygenated: yes  Patient position: sniffing  Mask difficulty assessment: 2 - vent by mask + OA or adjuvant +/- NMBA    Final Airway Details  Final airway type: endotracheal airway      Successful airway: ETT  Cuffed: yes   Successful intubation technique: Video laryngoscopy  Facilitating devices/methods: intubating stylet  Endotracheal tube insertion site: oral  Blade: GlideScope  Blade size: #3  ETT size (mm): 7.5    Cormack-Lehane Classification: grade IIA - partial view of glottis  Placement verified by: capnometry   Cuff volume (mL): 10  Measured from: lips  ETT to lips (cm): 23  Number of attempts at approach: 1  Ventilation between attempts: none  Number of other approaches attempted: 0    Additional Comments  PreO2.  IV induction as noted.  Eyes taped. Easy mask ventilation.  Glidescope #3 used, vocal cords visualized, atraumatic oral intubation ETT 7.5, +ETCO2, +BBS.  Taped and secured at 23 cm.

## 2024-08-20 NOTE — ANESTHESIA PROCEDURE NOTES
Airway  Date/Time: 8/20/2024 2:23 PM    General Information and Staff    Resident/CRNA: Jacqueline Vazquez CRNA  Performed: CRNA   Performed by: Jacqueline Vazquez CRNA  Authorized by: Eloy Orosco MD

## 2024-08-20 NOTE — DISCHARGE INSTRUCTIONS
Thank you for choosing the Surgical Oncology Team at [facility]  Caring for a Closed Suction Drainage Tube  A drainage tube removes fluid from around an incision. This helps prevent infection and promotes healing. The collection bulb at the end of the tube is squeezed and plugged to create suction. The bulb should be emptied and reset when half full to maintain adequate suction. You need to empty the bulb and clean the skin around the drain as often as your healthcare provider tells you to. Follow the steps below.     What you’ll need  Have the following items ready:  Disposable gloves  Measuring cup  Record sheet  Gauze or paper towel  Sterile cotton swabs or 4\" x 4\" gauze pads  Sterile saline or soap and water       Step 1. Empty the bulb  Wash your hands and put on a new pair of disposable gloves.  Point the top of the bulb away from you and remove the stopper.  Turn the bulb upside down over a measuring cup. Squeeze the fluid into the cup. Make sure the bulb is totally empty.  Put the cup to one side. You can record the volume of liquid in the cup after you clean and reconnect the bulb in step 2.    Step 2. Clean and reconnect the bulb  Clean the top of the bulb with clean gauze or a paper towel, if needed.  Squeeze the bulb tight, and put the stopper back on the top.  Record the amount of fluid in the cup. Then, empty the cup as directed.    Step 3. Clean the site  Remove your disposable gloves and wash your hands before cleaning the site.  Put on a new pair of disposable gloves.  Wet a sterile cotton swab or 4\" x 4\" gauze pad with sterile saline or soap and water.  Gently clean the skin around the drain. Always wipe away from the incision.  Apply an antibacterial ointment if directed.   When to call your healthcare provider  Call your healthcare provider if you notice any of these changes:  The amount of fluid increases or decreases suddenly  Large amount of blood or a clot in drainage  Color,  odor, or thickness of the fluid changes  Tube falls out or the incision opens  Skin around the drain is red, swollen, painful, or seeping pus  You have a fever of 100.4°F (38°C) or higher, or as directed by your healthcare provider     If the tube isn't draining  Here are tips to drain the tube:  Uncurl any kinks in the tube.  With one hand, firmly hold the base of the tube between your thumb and index finger. Do not touch the incision.  Put the thumb and index finger of your other hand on the tube, next to the first hand. Pinch your fingers together. Then pull them along the tube toward the bag. This will help push any clogged fluid through the tube. This is called \"stripping the tube.\" You may find it helpful to hold an alcohol swab between your fingers and the tube to lubricate the tubing.  If the tube still does not drain, call your healthcare provider.             Drain Record Sheet    Date Time Drain #1 Drain #2

## 2024-08-21 NOTE — OPERATIVE REPORT
[facility]    PATIENT'S NAME: OSIRIS DAVIS   ATTENDING PHYSICIAN: Rafi Harley MD   OPERATING PHYSICIAN: Rafi Harley MD   PATIENT ACCOUNT#:   [id number]    LOCATION:  [facility]  MEDICAL RECORD #:   [id number]       YOB: 1965  ADMISSION DATE:       08/20/2024      OPERATION DATE:  08/20/2024    OPERATIVE REPORT      PREOPERATIVE DIAGNOSIS:    1.   History of lymphoma.    2.   Right axilla lymphadenopathy, PET avid.  POSTOPERATIVE DIAGNOSIS:    1.   History of lymphoma.    2.   Right axilla lymphadenopathy, PET avid.  PROCEDURE:    1.   Right axillary lymph node excision/superficial dissection.    2.   Intraoperative ultrasound.      ASSISTANT:  Bonita Koo PA-C     ANESTHESIA:  General.      INDICATIONS:  The patient is a 59-year-old man with a prior history of lymphoma, who presents with PET-avid right axillary lymph nodes.  He presents today for surgical biopsy.  The surgical treatment matter had been discussed with him including indications and risks.    OPERATIVE TECHNIQUE:  Patient was brought to the operating room and was placed in supine position.  He was given general anesthesia by the anesthesiology service.  Sequential compression boots were placed.  Patient received preoperative intravenous antibiotics prophylaxis.  He was prepped and draped in normal sterile fashion.  The right axillary incision was made and deepened.  Multiple small lymph nodes were encountered to ensure no presence of enlarged lymph nodes.  Dissection was carried out within the axillary space to the level of the right axillary vein down to the latissimus.  The thoracodorsal bundle was then identified and preserved.  Multiple lymph nodes were excised, not measuring more than 1 cm, and sent fresh to Pathology.  Hemostasis was achieved and maintained.  I did perform ultrasound of the axillary space, revealing no evidence for enlarged lymph nodes.  Given the dissection, I placed a 15  Steven drain and stitched it into place using 3-0 nylon suture.  The wound was then infiltrated with 0.25% Marcaine.  Hemostasis was achieved and maintained.  The wound was then closed in layers using 3-0 and 4-0 Vicryl sutures.  Steri-Strips with Telfa and Tegaderm dressings applied.      The patient tolerated the procedure well without immediate complications.  He was extubated in the operating room and was sent in stable condition to recovery room.  Counts were correct.  I was present throughout the procedure.    Dictated By Rafi Harley MD  d: 08/20/2024 15:18:38  t: 08/20/2024 19:05:15  Job [id number]  [facility]/

## 2024-08-22 ENCOUNTER — TELEPHONE (OUTPATIENT)
Dept: SURGERY | Facility: CLINIC | Age: 59
End: 2024-08-22

## 2024-08-22 NOTE — TELEPHONE ENCOUNTER
Called to check on patient after procedure.  Patient doing well with no swelling and minimal discomfort.  Patient dressing intact.  Drain having 15 ml output that has lessened daily.  Confirmed post op for Monday.

## 2024-08-26 ENCOUNTER — OFFICE VISIT (OUTPATIENT)
Dept: SURGERY | Facility: CLINIC | Age: 59
End: 2024-08-26
Payer: COMMERCIAL

## 2024-08-26 VITALS
WEIGHT: 170.63 LBS | SYSTOLIC BLOOD PRESSURE: 132 MMHG | OXYGEN SATURATION: 97 % | HEART RATE: 72 BPM | BODY MASS INDEX: 23.89 KG/M2 | RESPIRATION RATE: 18 BRPM | DIASTOLIC BLOOD PRESSURE: 80 MMHG | TEMPERATURE: 98 F | HEIGHT: 71 IN

## 2024-08-26 DIAGNOSIS — C81.41: Primary | ICD-10-CM

## 2024-08-26 PROCEDURE — 3075F SYST BP GE 130 - 139MM HG: CPT | Performed by: PHYSICIAN ASSISTANT

## 2024-08-26 PROCEDURE — 99024 POSTOP FOLLOW-UP VISIT: CPT | Performed by: PHYSICIAN ASSISTANT

## 2024-08-26 PROCEDURE — 3008F BODY MASS INDEX DOCD: CPT | Performed by: PHYSICIAN ASSISTANT

## 2024-08-26 PROCEDURE — 3079F DIAST BP 80-89 MM HG: CPT | Performed by: PHYSICIAN ASSISTANT

## 2024-09-06 LAB
CD10 CELLS NFR SPEC: 35 %
CD10/CD19: 34 %
CD19 CELLS NFR SPEC: 59 %
CD19+/CD200+: 39 %
CD2 CELLS NFR SPEC: 41 %
CD20 CELLS NFR SPEC: 60 %
CD200 CELLS: 45 %
CD3 CELLS NFR SPEC: 41 %
CD3+/TCRGD+: 2 %
CD3+CD4+ CELLS NFR SPEC: 23 %
CD3+CD4+ CELLS/CD3+CD8+ CLL SPEC: 1.5
CD3+CD8+ CELLS NFR SPEC: 15 %
CD3-/CD56+: 1 %
CD34 CELLS NFR SPEC: <1 %
CD38 CELLS NFR SPEC: 2 %
CD38+/CD19+: 1 %
CD45 CELLS NFR SPEC: 100 %
CD5 CELLS NFR SPEC: 45 %
CD5/CD19 CELLS: 7 %
CD7 CELLS NFR SPEC: 38 %
CELL SURF KAPPA/LAMBDA RATIO: 1.6
CELL SURF LAMBDA LIGHT CHAIN: 20 %
CELL SURFACE KAPPA LIGHT CHAIN: 32 %
TCR G-D CELLS NFR SPEC: 4 %

## 2024-10-11 ENCOUNTER — LAB ENCOUNTER (OUTPATIENT)
Dept: LAB | Age: 59
End: 2024-10-11
Attending: FAMILY MEDICINE
Payer: COMMERCIAL

## 2024-10-11 DIAGNOSIS — Z86.39 HISTORY OF IRON DEFICIENCY: ICD-10-CM

## 2024-10-11 DIAGNOSIS — R97.20 ELEVATED PSA: ICD-10-CM

## 2024-10-11 DIAGNOSIS — E03.9 HYPOTHYROIDISM, UNSPECIFIED TYPE: ICD-10-CM

## 2024-10-11 DIAGNOSIS — E55.9 VITAMIN D DEFICIENCY: ICD-10-CM

## 2024-10-11 LAB
DEPRECATED HBV CORE AB SER IA-ACNC: 112 NG/ML
IRON SATN MFR SERPL: 19 %
IRON SERPL-MCNC: 71 UG/DL
T4 FREE SERPL-MCNC: 1.4 NG/DL (ref 0.8–1.7)
TOTAL IRON BINDING CAPACITY: 365 UG/DL (ref 250–425)
TRANSFERRIN SERPL-MCNC: 290 MG/DL (ref 215–365)
TSI SER-ACNC: 3.8 MIU/ML (ref 0.55–4.78)
VIT D+METAB SERPL-MCNC: 34.7 NG/ML (ref 30–100)

## 2024-10-11 PROCEDURE — 83550 IRON BINDING TEST: CPT

## 2024-10-11 PROCEDURE — 84154 ASSAY OF PSA FREE: CPT

## 2024-10-11 PROCEDURE — 84443 ASSAY THYROID STIM HORMONE: CPT

## 2024-10-11 PROCEDURE — 84153 ASSAY OF PSA TOTAL: CPT

## 2024-10-11 PROCEDURE — 82728 ASSAY OF FERRITIN: CPT

## 2024-10-11 PROCEDURE — 84439 ASSAY OF FREE THYROXINE: CPT

## 2024-10-11 PROCEDURE — 82306 VITAMIN D 25 HYDROXY: CPT

## 2024-10-11 PROCEDURE — 83540 ASSAY OF IRON: CPT

## 2024-10-12 LAB
%FPSA REFLEX: 17.7 %
%FPSA REFLEX: 17.7 %
PROSTATE SPECIFIC AG: 6 NG/ML
PROSTATE SPECIFIC AG: 6 NG/ML
PSA, FREE: 1.06 NG/ML
PSA, FREE: 1.06 NG/ML

## 2024-10-16 DIAGNOSIS — Z79.899 MEDICATION MANAGEMENT: Primary | ICD-10-CM

## 2024-10-16 RX ORDER — LEVOTHYROXINE SODIUM 25 UG/1
25 TABLET ORAL
Qty: 90 TABLET | Refills: 1 | Status: SHIPPED | OUTPATIENT
Start: 2024-10-16

## 2024-10-16 NOTE — TELEPHONE ENCOUNTER
Last office visit: 7/15/2024   Protocol: pass  Requested medication(s) are due for refill today: yes  Requested medication(s) are on the active medication list same strength, form, dose/ sig: yes  Requested medication(s) are managed by provider: yes  Patient has already received a courtsey refill: no    NOV: 1/15/2025  Last Labs: 10/11/2024  Asked to Return: 1/15/2025

## 2024-10-18 DIAGNOSIS — R97.20 ELEVATED PSA: Primary | ICD-10-CM

## 2024-12-15 DIAGNOSIS — Z79.899 MEDICATION MANAGEMENT: ICD-10-CM

## 2024-12-16 RX ORDER — LEVOTHYROXINE SODIUM 25 UG/1
25 TABLET ORAL
Qty: 90 TABLET | Refills: 1 | Status: SHIPPED | OUTPATIENT
Start: 2024-12-16

## 2024-12-16 NOTE — TELEPHONE ENCOUNTER
Last office visit: 07/15/2024   Protocol: PASS    Requested medication(s) are due for refill today: Yes    Requested medication(s) are on the active medication list same strength, form, dose/ sig: Yes    Requested medication(s) are managed by provider: Yes    Patient has already received a courtsey refill: No    NOV: 1/15/2025  Asked to Return: 1/15/2025

## 2025-01-03 ENCOUNTER — APPOINTMENT (OUTPATIENT)
Age: 60
End: 2025-01-03
Attending: INTERNAL MEDICINE
Payer: COMMERCIAL

## 2025-01-10 ENCOUNTER — LAB ENCOUNTER (OUTPATIENT)
Dept: LAB | Age: 60
End: 2025-01-10
Attending: FAMILY MEDICINE
Payer: COMMERCIAL

## 2025-01-10 DIAGNOSIS — R73.03 PREDIABETES: ICD-10-CM

## 2025-01-10 DIAGNOSIS — E78.5 DYSLIPIDEMIA: ICD-10-CM

## 2025-01-10 LAB
ALBUMIN SERPL-MCNC: 4.7 G/DL (ref 3.2–4.8)
ALBUMIN/GLOB SERPL: 1.6 {RATIO} (ref 1–2)
ALP LIVER SERPL-CCNC: 53 U/L
ALT SERPL-CCNC: 42 U/L
ANION GAP SERPL CALC-SCNC: 8 MMOL/L (ref 0–18)
AST SERPL-CCNC: 26 U/L (ref ?–34)
BILIRUB SERPL-MCNC: 0.4 MG/DL (ref 0.3–1.2)
BUN BLD-MCNC: 14 MG/DL (ref 9–23)
CALCIUM BLD-MCNC: 9.8 MG/DL (ref 8.7–10.4)
CHLORIDE SERPL-SCNC: 105 MMOL/L (ref 98–112)
CHOLEST SERPL-MCNC: 194 MG/DL (ref ?–200)
CO2 SERPL-SCNC: 28 MMOL/L (ref 21–32)
CREAT BLD-MCNC: 1.17 MG/DL
CREAT UR-SCNC: 87 MG/DL
EGFRCR SERPLBLD CKD-EPI 2021: 73 ML/MIN/1.73M2 (ref 60–?)
EST. AVERAGE GLUCOSE BLD GHB EST-MCNC: 123 MG/DL (ref 68–126)
FASTING PATIENT LIPID ANSWER: YES
FASTING STATUS PATIENT QL REPORTED: YES
GLOBULIN PLAS-MCNC: 2.9 G/DL (ref 2–3.5)
GLUCOSE BLD-MCNC: 107 MG/DL (ref 70–99)
HBA1C MFR BLD: 5.9 % (ref ?–5.7)
HDLC SERPL-MCNC: 36 MG/DL (ref 40–59)
LDLC SERPL CALC-MCNC: 111 MG/DL (ref ?–100)
MICROALBUMIN UR-MCNC: 1.2 MG/DL
MICROALBUMIN/CREAT 24H UR-RTO: 13.8 UG/MG (ref ?–30)
NONHDLC SERPL-MCNC: 158 MG/DL (ref ?–130)
OSMOLALITY SERPL CALC.SUM OF ELEC: 293 MOSM/KG (ref 275–295)
POTASSIUM SERPL-SCNC: 4.4 MMOL/L (ref 3.5–5.1)
PROT SERPL-MCNC: 7.6 G/DL (ref 5.7–8.2)
SODIUM SERPL-SCNC: 141 MMOL/L (ref 136–145)
TRIGL SERPL-MCNC: 270 MG/DL (ref 30–149)
VLDLC SERPL CALC-MCNC: 47 MG/DL (ref 0–30)

## 2025-01-10 PROCEDURE — 83036 HEMOGLOBIN GLYCOSYLATED A1C: CPT

## 2025-01-10 PROCEDURE — 80053 COMPREHEN METABOLIC PANEL: CPT

## 2025-01-10 PROCEDURE — 82043 UR ALBUMIN QUANTITATIVE: CPT

## 2025-01-10 PROCEDURE — 80061 LIPID PANEL: CPT

## 2025-01-10 PROCEDURE — 82570 ASSAY OF URINE CREATININE: CPT

## 2025-01-10 PROCEDURE — 36415 COLL VENOUS BLD VENIPUNCTURE: CPT

## 2025-01-13 ENCOUNTER — OFFICE VISIT (OUTPATIENT)
Age: 60
End: 2025-01-13
Attending: INTERNAL MEDICINE
Payer: COMMERCIAL

## 2025-01-13 VITALS
HEIGHT: 70.98 IN | BODY MASS INDEX: 24.08 KG/M2 | WEIGHT: 172 LBS | DIASTOLIC BLOOD PRESSURE: 82 MMHG | RESPIRATION RATE: 16 BRPM | HEART RATE: 78 BPM | TEMPERATURE: 97 F | SYSTOLIC BLOOD PRESSURE: 156 MMHG | OXYGEN SATURATION: 99 %

## 2025-01-13 DIAGNOSIS — C81.41: Primary | ICD-10-CM

## 2025-01-13 DIAGNOSIS — R59.0 POSTERIOR CERVICAL LYMPHADENOPATHY: ICD-10-CM

## 2025-01-13 LAB
BASOPHILS # BLD AUTO: 0.06 X10(3) UL (ref 0–0.2)
BASOPHILS NFR BLD AUTO: 0.7 %
EOSINOPHIL # BLD AUTO: 0.16 X10(3) UL (ref 0–0.7)
EOSINOPHIL NFR BLD AUTO: 1.8 %
ERYTHROCYTE [DISTWIDTH] IN BLOOD BY AUTOMATED COUNT: 12.8 %
HCT VFR BLD AUTO: 44.8 %
HGB BLD-MCNC: 15.1 G/DL
IMM GRANULOCYTES # BLD AUTO: 0.03 X10(3) UL (ref 0–1)
IMM GRANULOCYTES NFR BLD: 0.3 %
LDH SERPL L TO P-CCNC: 217 U/L
LYMPHOCYTES # BLD AUTO: 4.55 X10(3) UL (ref 1–4)
LYMPHOCYTES NFR BLD AUTO: 51.8 %
MCH RBC QN AUTO: 26.9 PG (ref 26–34)
MCHC RBC AUTO-ENTMCNC: 33.7 G/DL (ref 31–37)
MCV RBC AUTO: 79.9 FL
MONOCYTES # BLD AUTO: 0.81 X10(3) UL (ref 0.1–1)
MONOCYTES NFR BLD AUTO: 9.2 %
NEUTROPHILS # BLD AUTO: 3.17 X10 (3) UL (ref 1.5–7.7)
NEUTROPHILS # BLD AUTO: 3.17 X10(3) UL (ref 1.5–7.7)
NEUTROPHILS NFR BLD AUTO: 36.2 %
PLATELET # BLD AUTO: 351 10(3)UL (ref 150–450)
RBC # BLD AUTO: 5.61 X10(6)UL
WBC # BLD AUTO: 8.8 X10(3) UL (ref 4–11)

## 2025-01-13 NOTE — PROGRESS NOTES
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    Since last visit he had biopsy of right axillary lymph nodes. He had neck and axillary nodes that had increased SUV on PET scan. The multiple lymph nodes that were excised showed benign lymph nodes with nonspecific reactive features. There was no evidence of lymphoma.    He has not had any change in his neck. He has no new mass. He has no fever or sweats. He has no good appetite and no weight loss. He has no pain.    He had needle core biopsy of the left kidney mass on 4/14/2021 that showed clear cell RCC, grade I. He had RFA on 4/14/2021. He is following with Dr. Alonso. He had repeat CT scan on 6/2/2021 that showed Interval ablation of the mass.There was no LAD.    He has had two cycles of ABVD followed by radiation from 9/28/2020 to 10/9/2020.     He had a repeat CT scan of the CAP on 3/8/2021. This was negative for lymphadenopathy or evidence of hodgkin's disease. He had a first COVID vaccine after this CT in 3/2021 and a second vaccine in 4/2021. There was a left kidney cystic density. He has an US scheduled  later this week.      Pathology from 8/20/2024:  Final Diagnosis:     Excision, right axillary lymph nodes:  -Benign lymph nodes with nonspecific reactive features.  -Negative for malignancy.     Electronically signed by Goldberg, Cathryn A, MD on 9/6/2024 at 1423        Final Diagnosis Comment      The lymph node architecture is well preserved with open sinuses.  There are reactive appearing germinal centers with tingible body macrophages and evidence of polarization.  The interfollicular region is composed of lymphocytes, plasma cells and scattered immunoblasts.  There are no atypical cells.  There are no granulomas.     Immunohistochemistry is performed to evaluate the lymph node architecture.  The germinal centers are CD20, CD10 positive while they are negative for BCL-2.  The CD20 positive B lymphocytes do not express CD5, cyclin D1, LEF1 or SOX 11.  Cytokeratin stain is negative.    Flow cytometry shows no  aberrant T-cell phenotype.  The majority of the B lymphocytes are polytypic.  However, a small population show lambda surface light chain restriction.  They are positive for CD19, CD10 and CD20.  As a result of this finding, immunoglobulin gene rearrangement analysis was requested.  No clonal immunoglobulin gene rearrangement was detected.    The morphologic, molecular and immunophenotypic findings are consistent with a nonspecific reactive lymph node.  Dr Nicholson has reviewed the case and concurs with the above diagnosis.         Review of Systems:   Constitutional: Negative for  fevers, chills, night sweats and weight loss.  Eyes: Negative for visual disturbance, irritation and redness.  Cardiovascular: Negative for angina, orthopnea or palpitations.  Integument/breast: Negative for rash, skin lesions, and pruritus.  Hematologic/lymphatic: Negative for easy bruising, bleeding  Musculoskeletal: Negative for myalgias, arthralgias, muscle weakness.  Genitourinary: Negative for dysuria or hematuria  The  pertinent positives and negatives were described. All other systems were negative.    PMH/PSH:  Past Medical History:    Back problem    Cancer (HCC)    lymphoma    Exposure to medical diagnostic radiation    10/2020    High cholesterol    Hyperlipidemia    Personal history of antineoplastic chemotherapy    10/2020- last dose 10/2022    Visual impairment    glasses    Vitamin D deficiency       Past Surgical History:   Procedure Laterality Date    Appendectomy  age 19    Biopsy Left 06/2020    left neck LN biopsy    Colonoscopy  05/2021    Other Right     portacath placement    Removal of tonsils,14+ y/o N/A 5/27/2016    Procedure: TONSILLECTOMY;  Surgeon: Sreekanth Daniels MD;  Location: [facility]       Family History Reviewed:  Family History   Problem Relation Age of Onset    Breast Cancer Maternal Grandmother 53    Blood Disorder Maternal Grandmother         cerebral artery aneurysm    Arthritis Maternal Grandmother         osteoarthritis    Cancer Daughter         burkitt lymphoma - intraabdominal or soft tissues    Seizure Disorder Father         epilepsy       Allergies:     Allergies   Allergen Reactions    Compazine [Prochlorperazine] ANXIETY and Tightness in Chest       Medications:  No outpatient medications have been marked as taking for the 1/13/25 encounter (Office Visit) with Sreekanth Vidal MD.         Vital Signs:      Height: 180.3 cm (5' 10.98\") (01/13 1043)  Weight: 78 kg (172 lb) (01/13 1043)  BSA (Calculated - sq m): 1.98 sq meters (01/13 1043)  Pulse: 78 (01/13 1043)  BP: 156/82 (01/13 1043)  Temp: 97.2 °F (36.2 °C) (01/13 1043)  Do Not Use - Resp Rate: --  SpO2: 99 % (01/13 1043)      Performance Status:  ECOG 0: Fully active, able to carry on all pre-disease performance without restriction     Physical Examination:    Constitutional: Patient is alert and not in acute distress.  Respiratory: Clear to auscultation and percussion. No rales.  No wheezes.  Cardiovascular: Regular rate and  rhythm. No murmurs.  Gastrointestinal: Soft, non tender with good bowel sounds.  Musculoskeletal: No edema. No calf tenderness.  Skin: No suspicious skin lesion, no rash, no ulceration.  Lymphatics: There is no palpable lymphadenopathy throughout in the supraclavicular, or axillary regions. There was a less than 1 cm soft node in the posterior left neck in the region of his concern.  Psychiatric: The patient's mood is calm and appropriate for this visit.      Labs reviewed at this visit:     Lab Results   Component Value Date    WBC 5.9 07/15/2024    RBC 5.04 07/15/2024    HGB 13.5 07/15/2024    HCT 40.2 07/15/2024    MCV 79.8 (L) 07/15/2024    MCH 26.8 07/15/2024    MCHC 33.6 07/15/2024    RDW 14.8 07/15/2024    .0 07/15/2024    MPV 9.5 07/06/2012     Lab Results   Component Value Date     01/10/2025    K 4.4 01/10/2025     01/10/2025    CO2 28.0 01/10/2025    BUN 14 01/10/2025    CREATSERUM 1.17 01/10/2025     (H) 01/10/2025    CA 9.8 01/10/2025    ALKPHO 53 01/10/2025    ALT 42 01/10/2025    AST 26 01/10/2025    BILT 0.4 01/10/2025    ALB 4.7 01/10/2025    TP 7.6 01/10/2025     Lab Component   Component Value Date/Time     01/09/2023 0719        Radiologic imaging reviewed at this visit:    PET scan on 7/26/2024:  FINDINGS:       Mediastinal blood pool is 2.28 max SUV, and hepatic blood pool is 2.69 max SUV.     HEAD/NECK:  Scattered subcentimeter nodes are present throughout the neck bilaterally with largest measuring 7 mm within the jugulodigastric space with a maximum SUV of 5.9.  LUNGS:  No pathological FDG activity.  No suspicious nodules on CT imaging.  MEDIASTINUM/NATACHA:  No pathological FDG activity.    CHEST WALL/AXILLA:  Multiple bilateral axillary nodes are present with largest measuring 9-10 mm in short axis dimension with a maximum SUV of 4.2.    ABDOMEN/ PELVIS:  Scattered retroperitoneal nodes with a small, 6 mm in short axis dimension node posterior to the IVC at  the level of the inferior right kidney with a maximum SUV of 4.5.  External iliac nodes measure 9 mm in short axis dimension on the  left with a maximum SUV of 5.  Small 2-3 mm node along the posterior left obturator chain demonstrates a maximum SUV of 3.  Bilateral inguinal nodes are also present with right inguinal node maximum SUV equals 2.7.  Heterogeneous uptake throughout the  prostate gland.  Low attenuation is throughout the liver consistent with fatty infiltration.  Post ablation changes involve the left kidney.  MUSCULOSKELETAL:  Right thenar eminence intramuscular uptake is most likely due to strain, correlate clinically.  No pathological FDG activity.  No suspicious lesions on CT imaging.     DE[place]UV[place] SCORE:    5 B. Uptake markedly increased compared to the liver at any new site that is possibly related to lymphoma.        Impression   CONCLUSION:  Multiple small nodes within the neck, axilla, abdomen and pelvis which demonstrate increased uptake most consistent with lymphoma given the history.       US of neck on 7/15/2024:  FINDINGS:    MASSES:  Targeted ultrasound left side of the neck the site of palpable lump demonstrates an 8 x 6 x 3 mm lymph node.  Additional lymph nodes were imaged in the left side of the neck for comparison purposes.  These measures 6 x 4 x 3 mm, 7 x 7 x 3 mm and   14 x 4 x 3 mm.  All the lymph nodes demonstrate an echogenic fatty sadiq.     Impression   CONCLUSION:  Palpable lump left side of the neck corresponds to an 8 x 6 x 3 mm lymph node.     Additional benign-appearing lymph nodes appreciated in the left side of the neck.       Further management of a palpable lymph node should be based on clinical grounds.       CT of CAP on 3/8/2021:  FINDINGS:         CHEST:     LUNGS:  Minimal subsegmental atelectasis in the lung bases. Stable 4 mm subpleural nodule in the right middle lobe, image 95. .     MEDIASTINUM:  No enlarged mediastinal adenopathy.  Small esophageal hiatal  hernia.     NATACHA:  No enlarged hilar adenopathy.     CARDIAC:  No enlargement, pericardial thickening, or significant calcification.   PLEURA:  No pneumothorax or effusion.     CHEST WALL:  No enlarged axillary adenopathy.     AORTA:  No aneurysm or dissection.     VASCULATURE:  No visible pulmonary arterial thrombus or attenuation.         ABDOMEN/PELVIS:   LIVER:  Diffuse fatty infiltration of the liver.     BILIARY:  Cholelithiasis.  No biliary ductal dilatation.   PANCREAS:  Homogeneous enhancement.   SPLEEN:  Normal caliber.   KIDNEYS:  No hydronephrosis.  There is a 1.8 cm heterogeneous mass arising from the midpole of the left kidney. This may represent a isodense cyst although solid component cannot be excluded.  This area was not is incompletely characterized on noncontrast PET CT imaging. Recommend correlation with renal ultrasound for further   characterization.   ADRENALS:  Normal.   AORTA/VASCULAR:  No aneurysm.   RETROPERITONEUM:  No enlarged adenopathy.   BOWEL/MESENTERY:  Normal caliber appendix. Uncomplicated colonic diverticulosis   ABDOMINAL WALL:  Small fat containing umbilical hernia.   PELVIC ORGANS:  Mild prostatic hypertrophy..   BONES:  No lytic or destructive process.  Mild degenerative changes in the lower lumbar facets.      Impression   CONCLUSION:  No enlarged adenopathy in the chest, abdomen or pelvis.       4 mm subpleural nodule in the right middle lobe is stable.       There is a 1.8 cm pedunculated mass arising from the mid to inferior pole of the left kidney.  This may represent a hyperdense cyst.  Solid component cannot be excluded.  Recommend follow-up with ultrasound for further characterization.       Diffuse fatty infiltration of the liver.          PET/CT on 9/9/2020:  FINDINGS:       Improvement. There has been complete resolution of previously demonstrated abnormal activity in the left neck related to previously demonstrated FDG avid lymph nodes. The lymph nodes all have  decreased in size as well, and all are now subcentimeter. No new or enlarging mass, or new area of abnormal FDG activity. There is iatrogenic linear activity related to central venous catheter within the SVC.  Normal activity patterns are otherwise seen in the chest, abdomen, pelvis, head and neck. Fatty liver changes are seen    =====  CONCLUSION:       1. Resolution of previously demonstrated abnormal activity in the left neck related to previously demonstrated enlarged and FDG avid lymph nodes. In addition to no longer showing FDG uptake, the lymph nodes are all smaller, all subcentimeter.     2. Iatrogenic activity along the central venous catheter within the chest. Generally this can be seen with some of the injected FDG agent becoming attached to the catheter during injection, possibly related to fibrin and/or chronic thrombus. Advise correlation with catheter functioning.        Assessment/Plan:     Classical Hodgkin's Lymphoma, Lymphocyte-rich type:     He had two cycles of ABVD. He had complete response on repeat CT/PET scan. He completed radiation on 10/9/2020. Repeat CT scan on 6/29/2022 showed no evidence of progressive disease.     He had small but numerous neck and axillary nodes in 7/2024. The PET scan had suspicious findings. He had excision biopsy that showed reactive nodes. He has no change in his exam. I recommended that we get a PET scan now to compare to the prior PET scan findings. If this is stable or improved then this would support the reactive node finding. If there is any increase then we would need repeat biopsy.    I will see him in six months with repeat labs.    Left RCC:  Grade I  S/P RFA on 5/14/2021    Continue with six month follow up with labs. Continue with  follow up. The plan is for yearly imaging.        Sreekanth Vidal MD

## 2025-01-13 NOTE — PROGRESS NOTES
Patient here for 6 month f/u for lymphoma. Patient completed a left side neck node biopsy on 8/20/24. Patient denies pain, cough or night sweats. Patient has no current complaints or concerns at this point.     Education Record    Learner:  Patient    Disease / Diagnosis: lymphoma     Barriers / Limitations:  None   Comments:    Method:  Discussion   Comments:    General Topics:  Medication, Side effects and symptom management, and Plan of care reviewed   Comments:    Outcome:  Shows understanding   Comments:

## 2025-01-15 ENCOUNTER — OFFICE VISIT (OUTPATIENT)
Dept: FAMILY MEDICINE CLINIC | Facility: CLINIC | Age: 60
End: 2025-01-15
Payer: COMMERCIAL

## 2025-01-15 ENCOUNTER — PATIENT MESSAGE (OUTPATIENT)
Dept: FAMILY MEDICINE CLINIC | Facility: CLINIC | Age: 60
End: 2025-01-15

## 2025-01-15 VITALS
SYSTOLIC BLOOD PRESSURE: 112 MMHG | DIASTOLIC BLOOD PRESSURE: 68 MMHG | WEIGHT: 173 LBS | HEIGHT: 70.98 IN | BODY MASS INDEX: 24.22 KG/M2 | RESPIRATION RATE: 18 BRPM | OXYGEN SATURATION: 100 % | HEART RATE: 65 BPM

## 2025-01-15 DIAGNOSIS — R73.03 PREDIABETES: Primary | ICD-10-CM

## 2025-01-15 DIAGNOSIS — D56.3 ALPHA THALASSEMIA SILENT CARRIER: ICD-10-CM

## 2025-01-15 DIAGNOSIS — Z85.528 HISTORY OF RENAL CARCINOMA: ICD-10-CM

## 2025-01-15 DIAGNOSIS — E78.5 HYPERLIPIDEMIA LDL GOAL <100: ICD-10-CM

## 2025-01-15 DIAGNOSIS — Z79.899 MEDICATION MANAGEMENT: ICD-10-CM

## 2025-01-15 DIAGNOSIS — E55.9 VITAMIN D DEFICIENCY: ICD-10-CM

## 2025-01-15 DIAGNOSIS — E78.1 HYPERTRIGLYCERIDEMIA: ICD-10-CM

## 2025-01-15 DIAGNOSIS — Z23 NEED FOR VACCINATION: ICD-10-CM

## 2025-01-15 DIAGNOSIS — E78.6 LOW HDL (UNDER 40): ICD-10-CM

## 2025-01-15 DIAGNOSIS — R97.20 ELEVATED PSA: ICD-10-CM

## 2025-01-15 DIAGNOSIS — Z71.85 VACCINE COUNSELING: ICD-10-CM

## 2025-01-15 DIAGNOSIS — C81.41: ICD-10-CM

## 2025-01-15 DIAGNOSIS — I34.0 MILD MITRAL REGURGITATION: ICD-10-CM

## 2025-01-15 PROCEDURE — 3008F BODY MASS INDEX DOCD: CPT | Performed by: FAMILY MEDICINE

## 2025-01-15 PROCEDURE — 90471 IMMUNIZATION ADMIN: CPT | Performed by: FAMILY MEDICINE

## 2025-01-15 PROCEDURE — 90656 IIV3 VACC NO PRSV 0.5 ML IM: CPT | Performed by: FAMILY MEDICINE

## 2025-01-15 PROCEDURE — 3074F SYST BP LT 130 MM HG: CPT | Performed by: FAMILY MEDICINE

## 2025-01-15 PROCEDURE — 3078F DIAST BP <80 MM HG: CPT | Performed by: FAMILY MEDICINE

## 2025-01-15 PROCEDURE — 99214 OFFICE O/P EST MOD 30 MIN: CPT | Performed by: FAMILY MEDICINE

## 2025-01-15 RX ORDER — FENOFIBRATE 145 MG/1
145 TABLET, COATED ORAL DAILY
Qty: 30 TABLET | Refills: 3 | Status: SHIPPED | OUTPATIENT
Start: 2025-01-15

## 2025-01-15 NOTE — PROGRESS NOTES
Patel Montalvo is a 59 year old male.  HPI:   Patient is here for medication visit.  Hodgkin's lymphoma-per Dr. Vidal; will repeat PET  Prediabetes - 5.9  Dyslipidemia - Elevated fats and low HDL and mildly elevated LDL  Vitamin D  -- taking 2000iu/d  Hx of clear cell carcinoma -- doing well; seen Dr. Alonso in the past for it  Elevated PSA -- due to see Dr. Metz  Mild mitral regurg -- seen in echo in 2020  Medications reviewed.      Current Outpatient Medications   Medication Sig Dispense Refill    fenofibrate 145 MG Oral Tab Take 1 tablet (145 mg total) by mouth daily. 30 tablet 3    levothyroxine 25 MCG Oral Tab Take 1 tablet (25 mcg total) by mouth before breakfast. 90 tablet 1    Cholecalciferol (VITAMIN D) 50 MCG (2000 UT) Oral Cap Take by mouth. With vitamin K        Allergies   Allergen Reactions    Compazine [Prochlorperazine] ANXIETY and Tightness in Chest      Past Medical History:    Back problem    Cancer (HCC)    lymphoma    Exposure to medical diagnostic radiation    10/2020    High cholesterol    Hyperlipidemia    Personal history of antineoplastic chemotherapy    10/2020- last dose 10/2022    Visual impairment    glasses    Vitamin D deficiency      Social History:  Social History     Socioeconomic History    Marital status:     Number of children: 2   Occupational History    Occupation:    Tobacco Use    Smoking status: Never    Smokeless tobacco: Never   Vaping Use    Vaping status: Never Used   Substance and Sexual Activity    Alcohol use: Not Currently    Drug use: No    Sexual activity: Yes     Partners: Female   Other Topics Concern    Caffeine Concern Yes     Comment: 2 cups of tea dly    Exercise Yes     Comment: 2 x week   Social History Narrative    , lives at home with wife    Has 2 daughters (19 and 17) - one at [employer], 1 is a senior in     On disability at this time        Results for orders placed or performed in visit on 01/13/25   CBC W/DIFF [E]     Collection Time: 01/13/25 11:56 AM   Result Value Ref Range    WBC 8.8 4.0 - 11.0 x10(3) uL    RBC 5.61 4.30 - 5.70 x10(6)uL    HGB 15.1 13.0 - 17.5 g/dL    HCT 44.8 39.0 - 53.0 %    .0 150.0 - 450.0 10(3)uL    MCV 79.9 (L) 80.0 - 100.0 fL    MCH 26.9 26.0 - 34.0 pg    MCHC 33.7 31.0 - 37.0 g/dL    RDW 12.8 %    Neutrophil Absolute Prelim 3.17 1.50 - 7.70 x10 (3) uL    Neutrophil Absolute 3.17 1.50 - 7.70 x10(3) uL    Lymphocyte Absolute 4.55 (H) 1.00 - 4.00 x10(3) uL    Monocyte Absolute 0.81 0.10 - 1.00 x10(3) uL    Eosinophil Absolute 0.16 0.00 - 0.70 x10(3) uL    Basophil Absolute 0.06 0.00 - 0.20 x10(3) uL    Immature Granulocyte Absolute 0.03 0.00 - 1.00 x10(3) uL    Neutrophil % 36.2 %    Lymphocyte % 51.8 %    Monocyte % 9.2 %    Eosinophil % 1.8 %    Basophil % 0.7 %    Immature Granulocyte % 0.3 %   LDH [E]    Collection Time: 01/13/25 11:56 AM   Result Value Ref Range     120 - 246 U/L       REVIEW OF SYSTEMS:   GENERAL: feels well otherwise  SKIN: denies any unusual skin lesions  EYES:denies blurred vision or double vision  HEENT: denies nasal congestion, sinus pain or ST  LUNGS: denies shortness of breath with exertion  CARDIOVASCULAR: denies chest pain on exertion  GI: denies abdominal pain,denies heartburn  : denies dysuria  MUSCULOSKELETAL: denies back pain  NEURO: denies headaches  PSYCHE: denies depression or anxiety  HEMATOLOGIC: hx of anemia  ENDOCRINE: denies thyroid history  ALL/ASTHMA: denies hx of allergy or asthma    EXAM:   /68 (BP Location: Left arm, Patient Position: Sitting, Cuff Size: adult)   Pulse 65   Resp 18   Ht 5' 10.98\" (1.803 m)   Wt 173 lb (78.5 kg)   SpO2 100%   BMI 24.14 kg/m²   GENERAL: well developed, well nourished,in no apparent distress  PSYCHE: normal mood and affect  SKIN: no rashes,no suspicious lesions  NECK: supple,no adenopathy,no bruits, no thyromegaly or nodule.  LUNGS: clear to auscultation  CARDIO: RRR without murmur  GI: good  BS's,no masses, HSM or tenderness  EXTREMITIES: no cyanosis, clubbing or edema    ASSESSMENT AND PLAN:     Encounter Diagnoses   Name Primary?    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)     Hyperlipidemia LDL goal <100     Hypertriglyceridemia     Low HDL (under 40)     Elevated PSA     Vitamin D deficiency     Mild mitral regurgitation     Alpha thalassemia silent carrier     History of renal carcinoma     Medication management     Vaccine counseling     Need for vaccination        Orders Placed This Encounter   Procedures    Comp Metabolic Panel (14)    Lipid Panel    Hemoglobin A1C    TdaP (Adacel, Boostrix) [27625]       Meds & Refills for this Visit:  Requested Prescriptions     Signed Prescriptions Disp Refills    fenofibrate 145 MG Oral Tab 30 tablet 3     Sig: Take 1 tablet (145 mg total) by mouth daily.       Imaging & Consults:  TETANUS, DIPHTHERIA TOXOIDS AND ACELLULAR PERTUSIS VACCINE (TDAP), >7 YEARS, IM USE  CARD ECHO 2D DOPPLER (CPT=93306)  CT CALCIUM SCORING     Lymphocyte-rich hodgkin lymphoma of lymph nodes of neck (hcc)  (primary encounter diagnosis)  -- per Dr. Vidal  Prediabetes  -- A1c 5.9  History of iron deficiency  -- stable  Vitamin d deficiency  -- taking vitamin D 2000 units daily  Microscopic hematuria  -- stable; CPM  Alpha thalassemia silent carrier  -- stable; CPM  Dyslipidemia -- advised ultrafast heart scan  Low hdl (under 40)  -- stable  Hypertriglyceridemia  --  advised fenofibrate  Fatty liver -- stable; CPM  Mild mitral regurg -- advised echo  Hx of renal carcinoma -- stable  Elevated  PSA -- due to see Dr. Metz  Vaccine counseling  -- given flu today  Advised TdaP.            The patient indicates understanding of these issues and agrees to the plan.  Return in about 3 months (around 4/15/2025) for med check 30.  .

## 2025-02-12 ENCOUNTER — HOSPITAL ENCOUNTER (OUTPATIENT)
Dept: NUCLEAR MEDICINE | Facility: HOSPITAL | Age: 60
Discharge: HOME | End: 2025-02-12
Attending: INTERNAL MEDICINE
Payer: COMMERCIAL

## 2025-02-12 DIAGNOSIS — R59.0 POSTERIOR CERVICAL LYMPHADENOPATHY: ICD-10-CM

## 2025-02-12 DIAGNOSIS — C81.41: ICD-10-CM

## 2025-02-12 LAB — GLUCOSE BLD-MCNC: 122 MG/DL (ref 70–99)

## 2025-02-12 PROCEDURE — 78815 PET IMAGE W/CT SKULL-THIGH: CPT | Performed by: INTERNAL MEDICINE

## 2025-02-12 PROCEDURE — 82962 GLUCOSE BLOOD TEST: CPT

## 2025-02-21 NOTE — PROGRESS NOTES
HPI:     Patel Montalvo is a 59 year old male with a PMH of HL, hypothyroid, lymphoma.    New to me, saw Ramonita and Gavin in the past.    Following for:  1. Left clear cell RCC s/p RFA 4/14/21  2. Elevated PSA  3. Recurrent UTIs  - UCx 5/15/24: Enterococcus    PCP - Niya Agrawal/Onc - [facility]  Prior Urologist - Gavin 7/26/22    Presents to establish care with me and discuss rising/elevated PSA.    He had RFA in April 2021 and feels well. Appetite and energy are good.  Had only UTI in May 2024 - no issues since.  No dysuria, hematuria, interim UTI symptoms.    AUA SS is 3/35 with 1 I, f, PERI. Happy with LUTS.  Incontinence: none  Penoscrotal: no abnormalites  ROSLYN: > 40 g prostate, no nodules or tenderness    UA is negative and PVR is 3 mL    Gross hematuria: none  Tobacco hx: none  Kidney stone hx: none  Fam h/o  malignancy: none    50% potency. Has not tried anything in the past and wants to address PSA first.    Prior PSAs:  - 6.0 10/11/24  - 10.90 7/15/24  - 3.68 1/9/23  - 3.38 6/8/21    Drinks ~ 40-60 oz water with medium yellow urine. I encouraged the pt drink at least 40-60 oz water per day or enough to keep urine clear to pale yellow for UTI prevention.    PET 2/12/25: decreased LN uptake  PET 7/26/24: multiple LN with uptake c/f lymphoma  CTU 6/16/24: s/p L RFA with no enhancing component, BWT, FLD  CT AP 6/2/21: as above  CT CAP 3/8/21: 18 mm LMP renal mass    Discussed proscar as option for BPH/LUTS and reviewed SEs and he wants to try this.    We reviewed the NCCN guidelines for options for the small renal mass following ablation.  - CT C/A recommended annually for 5 y, then as indicated    We discussed options for elevated PSA, including serial PSA measurements, 4K score, MRI of the prostate, or prostate biopsy and the risks and benefits to each option. He would prefer checking 4K score as next step.    Will check 4K today and leaning towards pBx if high risk. Starting proscar for BPH/LUTS. Observation  for now for ED.    I spent over 40 minutes in consultation and coordination of this patient's care today including review of pertinent labs, imaging, records with > 50% of time spent counseling.    HISTORY:  Past Medical History:    Back problem    Cancer (HCC)    lymphoma    Exposure to medical diagnostic radiation    10/2020    High cholesterol    Hyperlipidemia    Personal history of antineoplastic chemotherapy    10/2020- last dose 10/2022    Visual impairment    glasses    Vitamin D deficiency      Past Surgical History:   Procedure Laterality Date    Appendectomy  age 19    Biopsy Left 06/2020    left neck LN biopsy    Colonoscopy  05/2021    Other Right     portacath placement    Removal of tonsils,14+ y/o N/A 5/27/2016    Procedure: TONSILLECTOMY;  Surgeon: Sreekanth Daniels MD;  Location: [facility]      Family History   Problem Relation Age of Onset    Breast Cancer Maternal Grandmother 53    Blood Disorder Maternal Grandmother         cerebral artery aneurysm    Arthritis Maternal Grandmother         osteoarthritis    Cancer Daughter         burkitt lymphoma - intraabdominal or soft tissues    Seizure Disorder Father         epilepsy      Social History:   Social History     Socioeconomic History    Marital status:     Number of children: 2   Occupational History    Occupation:    Tobacco Use    Smoking status: Never    Smokeless tobacco: Never   Vaping Use    Vaping status: Never Used   Substance and Sexual Activity    Alcohol use: Not Currently    Drug use: No    Sexual activity: Yes     Partners: Female   Other Topics Concern    Caffeine Concern Yes     Comment: 2 cups of tea dly    Exercise Yes     Comment: 2 x week   Social History Narrative    , lives at home with wife    Has 2 daughters (19 and 17) - one at [employer], 1 is a senior in     On disability at this time        Medications (Active prior to today's visit):  Current Outpatient Medications   Medication  Sig Dispense Refill    finasteride 5 MG Oral Tab Take 1 tablet (5 mg total) by mouth daily. 90 tablet 6    fenofibrate 145 MG Oral Tab Take 1 tablet (145 mg total) by mouth daily. 30 tablet 3    levothyroxine 25 MCG Oral Tab Take 1 tablet (25 mcg total) by mouth before breakfast. 90 tablet 1    Cholecalciferol (VITAMIN D) 50 MCG (2000 UT) Oral Cap Take by mouth. With vitamin K         Allergies:  Allergies[1]      ROS:     A comprehensive 10 point review of systems was completed.  Pertinent positives and negatives noted in the the HPI.    PHYSICAL EXAM:     GENERAL APPEARANCE: well, developed, well nourished, in no acute distress  NEUROLOGIC: nonfocal, alert and oriented  HEAD: normocephalic, atraumatic  EYES: sclera non-icteric  EARS: hearing intact  ORAL CAVITY: mucosa moist  NECK/THYROID: no obvious goiter or masses  LUNGS: nonlabored breathing  ABDOMEN: soft, no obvious masses or tenderness  SKIN: no obvious rashes    : as noted above    ASSESSMENT/PLAN:   Diagnoses and all orders for this visit:    Elevated PSA  -     URINALYSIS, AUTO, W/O SCOPE  -     finasteride 5 MG Oral Tab; Take 1 tablet (5 mg total) by mouth daily.    Renal cell carcinoma of left kidney (HCC)  -     URINALYSIS, AUTO, W/O SCOPE    Recurrent UTI  -     URINALYSIS, AUTO, W/O SCOPE    BPH with obstruction/lower urinary tract symptoms    - as noted above.    Thanks again for this consult.    Boubacar Metz MD, FACS  Urologist  [facility]  Office: [phone]              [1]   Allergies  Allergen Reactions    Compazine [Prochlorperazine] ANXIETY and Tightness in Chest

## 2025-03-03 ENCOUNTER — LAB ENCOUNTER (OUTPATIENT)
Dept: LAB | Age: 60
End: 2025-03-03
Attending: UROLOGY
Payer: COMMERCIAL

## 2025-03-03 ENCOUNTER — OFFICE VISIT (OUTPATIENT)
Dept: SURGERY | Facility: CLINIC | Age: 60
End: 2025-03-03
Payer: COMMERCIAL

## 2025-03-03 DIAGNOSIS — C64.2 RENAL CELL CARCINOMA OF LEFT KIDNEY (HCC): ICD-10-CM

## 2025-03-03 DIAGNOSIS — N40.1 BPH WITH OBSTRUCTION/LOWER URINARY TRACT SYMPTOMS: ICD-10-CM

## 2025-03-03 DIAGNOSIS — R97.20 ELEVATED PSA: Primary | ICD-10-CM

## 2025-03-03 DIAGNOSIS — N39.0 RECURRENT UTI: ICD-10-CM

## 2025-03-03 DIAGNOSIS — N13.8 BPH WITH OBSTRUCTION/LOWER URINARY TRACT SYMPTOMS: ICD-10-CM

## 2025-03-03 LAB
APPEARANCE: CLEAR
BILIRUBIN: NEGATIVE
GLUCOSE (URINE DIPSTICK): NEGATIVE MG/DL
KETONES (URINE DIPSTICK): NEGATIVE MG/DL
LEUKOCYTES: NEGATIVE
MULTISTIX LOT#: ABNORMAL NUMERIC
NITRITE, URINE: NEGATIVE
PH, URINE: 6 (ref 4.5–8)
PROTEIN (URINE DIPSTICK): NEGATIVE MG/DL
SPECIFIC GRAVITY: <=1.005 (ref 1–1.03)
URINE-COLOR: CLEAR
UROBILINOGEN,SEMI-QN: 0.2 MG/DL (ref 0–1.9)

## 2025-03-03 PROCEDURE — 81003 URINALYSIS AUTO W/O SCOPE: CPT | Performed by: UROLOGY

## 2025-03-03 PROCEDURE — 36415 COLL VENOUS BLD VENIPUNCTURE: CPT

## 2025-03-03 PROCEDURE — 99215 OFFICE O/P EST HI 40 MIN: CPT | Performed by: UROLOGY

## 2025-03-03 PROCEDURE — G2211 COMPLEX E/M VISIT ADD ON: HCPCS | Performed by: UROLOGY

## 2025-03-03 RX ORDER — FINASTERIDE 5 MG/1
5 TABLET, FILM COATED ORAL DAILY
Qty: 90 TABLET | Refills: 6 | Status: SHIPPED | OUTPATIENT
Start: 2025-03-03

## 2025-03-05 ENCOUNTER — TELEPHONE (OUTPATIENT)
Dept: SURGERY | Facility: CLINIC | Age: 60
End: 2025-03-05

## 2025-03-05 DIAGNOSIS — R97.20 ELEVATED PSA: Primary | ICD-10-CM

## 2025-03-05 NOTE — TELEPHONE ENCOUNTER
Please call this patient or their family and schedule the patient for a follow up with me in 1 y with PSA.    Thanks,    MPH    Below if for Documentation Purposes Only:  4K 3.3%, PSA 3.32. Reviewed OTP. Will start proscar and see what imaging Dr Vidal has done at that time.

## (undated) DEVICE — UNDYED BRAIDED (POLYGLACTIN 910), SYNTHETIC ABSORBABLE SUTURE: Brand: COATED VICRYL

## (undated) DEVICE — DERMABOND LIQUID ADHESIVE

## (undated) DEVICE — RETRACT LONE STAR STAYS DULL

## (undated) DEVICE — CAUTERY BLADE 2IN INS E1455

## (undated) DEVICE — PROXIMATE SKIN STAPLERS (35 WIDE) CONTAINS 35 STAINLESS STEEL STAPLES (FIXED HEAD): Brand: PROXIMATE

## (undated) DEVICE — CLIP LIG M BLU TI HRT SHP WIRE HORZ

## (undated) DEVICE — GLOVE SUR 6.5 SENSICARE PI PIP CRM PWD F

## (undated) DEVICE — SUT PERMA- 0 18IN FSL NABSRB BLK L30MM 3/8

## (undated) DEVICE — SPONGE: SPECIALTY PEANUT XR 100/CS: Brand: MEDICAL ACTION INDUSTRIES

## (undated) DEVICE — SHEET,DRAPE,40X58,STERILE: Brand: MEDLINE

## (undated) DEVICE — STERILE SYNTHETIC POLYISOPRENE POWDER-FREE SURGICAL GLOVES WITH HYDROGEL COATING: Brand: PROTEXIS

## (undated) DEVICE — COVER,LIGHT,CAMERA,HARD,1/PK,STRL: Brand: MEDLINE

## (undated) DEVICE — SOL  .9 1000ML BTL

## (undated) DEVICE — CASED DISP BIPOLAR CORD

## (undated) DEVICE — SLEEVE COMPR MD KNEE LEN SGL USE KENDALL SCD

## (undated) DEVICE — DRAPE,TAPE STRIPS,STERILE: Brand: MEDLINE

## (undated) DEVICE — STERILE SYNTHETIC POLYISOPRENE POWDER-FREE SURGICAL GLOVES WITH HYDROGEL COATING, SMOOTH FINISH, STRAIGHT FINGER: Brand: PROTEXIS

## (undated) DEVICE — ANTIBACTERIAL UNDYED BRAIDED (POLYGLACTIN 910), SYNTHETIC ABSORBABLE SUTURE: Brand: COATED VICRYL

## (undated) DEVICE — PAD,NON-ADHERENT,3X8,STERILE,LF,1/PK: Brand: MEDLINE

## (undated) DEVICE — APPLICATOR PREP 26ML CHG 2% ISO ALC 70%

## (undated) DEVICE — TUBING MEGADYNE SPECULUM

## (undated) DEVICE — MEDI-VAC SUCTION FINE CAPACITY: Brand: CARDINAL HEALTH

## (undated) DEVICE — KENDALL SCD EXPRESS SLEEVES, KNEE LENGTH, MEDIUM: Brand: KENDALL SCD

## (undated) DEVICE — SOLUTION IRRIG 1000ML 0.9% NACL USP BTL

## (undated) DEVICE — BLADE 24 SS SRG STRL

## (undated) DEVICE — BREAST-HERNIA-PORT CDS-LF: Brand: MEDLINE INDUSTRIES, INC.

## (undated) DEVICE — LIGASURE EXACT DISSECTOR: Brand: LIGASURE

## (undated) DEVICE — HEAD AND NECK CDS-LF: Brand: MEDLINE INDUSTRIES, INC.

## (undated) DEVICE — SUTURE MONOCRYL 4-0 PS-2

## (undated) DEVICE — CHLORAPREP ORANGE TINT 10.5ML

## (undated) DEVICE — CLIP APPLIER: Brand: PREMIUM SURGICLIP II

## (undated) NOTE — LETTER
05/12/21        Ken Meza  [address] [place] [phone]      Dear Danelle Gaytan,    [facility] records indicate that you have outstanding lab work and or testing that was ordered for you and has not yet been completed:  Orders Placed This Encounter

## (undated) NOTE — LETTER
[facility]  Phone: [phone]  Right Fax: [phone]    ADDRESSEE INFORMATION: SENDER INFORMATION:   To:   Dr. Shakila Marin From: Josephine Simental RN     Department: Pre-Admission Testing   Fax Number: [phone] Date: communication in error, please immediately notify us by telephone and return the original message to us at [phone]. [address] via the [place].   10/5/2018

## (undated) NOTE — LETTER
[facility] [address], [address]  Consent for Procedure/Sedation    Date:     Time:       1. I authorize the performance upon Amarilis Calvo the following:  VENOUS ACCESS PORT REMOVAL     2.  I authorize  _______________________ (an ________________________________    ___________________    Witness: _________________________      Date: ___________________    Printed: 10/8/2020   9:07 AM  Patient Name: Eva Sosa        : 6/15/1965       Medical Record #: [id number]

## (undated) NOTE — MR AVS SNAPSHOT
[facility], [address] [id number]               Thank you for choosing us for your health care visit with Ronak Albarado DO.   We are glad to serve you and happy to provide you with this summary [email or web address]

## (undated) NOTE — LETTER
Printed: 2020    Patient Name: Saji Stanley  : 6/15/1965   Medical Record #: [id number]    Consent to Cancer Treatment    I, Saji Stanley, understand that I have been diagnosed with Hodgkin lymphoma.     I understand that the treatment suggested by my I can contact my oncologist or my Cancer Care Team at any time if I have questions, by calling [phone]. Additional written information will be given to me prior to start of therapy. Additionally, I will receive a copy of this consent form.     I h

## (undated) NOTE — MR AVS SNAPSHOT
After Visit Summary   3/30/2021    Soraya Chavis   MRN: [id number]           Visit Information     Date & Time  3/30/2021 10:51 AM Provider  Carlos Pérez RN Department  [facility] CT Dept.  Phone  [phone]      Allergies as of 3/30/2021  Review st [email or web address]         No text in SmartText       No text in SmartText

## (undated) NOTE — Clinical Note
Schedule appointment with me in about 6 months.   Patient needs triphasic CT of kidneys which I placed order in epic for 6 months

## (undated) NOTE — LETTER
[facility] [address], [address]  Consent for Procedure/Sedation    Date:     Time:       1. I authorize the performance upon Joaquín Aceves the following:  VENOUS ACCESS PORT IMPLANT     2.  I authorize  _______________________ (an ________________________________    ___________________    Witness: _________________________      Date: ___________________    Printed: 2020   1:15 PM  Patient Name: Kt Ask        : 6/15/1965       Medical Record #: [id number]